# Patient Record
Sex: MALE | Race: BLACK OR AFRICAN AMERICAN | Employment: OTHER | URBAN - METROPOLITAN AREA
[De-identification: names, ages, dates, MRNs, and addresses within clinical notes are randomized per-mention and may not be internally consistent; named-entity substitution may affect disease eponyms.]

---

## 2017-01-04 ENCOUNTER — HOSPITAL ENCOUNTER (OUTPATIENT)
Dept: PHYSICAL THERAPY | Age: 62
Discharge: HOME | End: 2017-01-04
Payer: MEDICARE

## 2017-01-04 PROCEDURE — 97140 MANUAL THERAPY 1/> REGIONS: CPT

## 2017-01-04 PROCEDURE — 97110 THERAPEUTIC EXERCISES: CPT

## 2017-01-04 NOTE — PROGRESS NOTES
PT DAILY TREATMENT NOTE - [facility]     Patient Name: Aggie Palomino  Date:2017  : 1955  [x]  Patient  Verified  Payor: Tila Dean / Plan: [facility] HUMANA MEDICARE CHOICE PPO/PFFS / Product Type: Managed Care Medicare /    In time:11:29  Out time:12:21  Total Treatment Time (min): 52  Total Timed Codes (min): 42  1:1 Treatment Time ( only): 38   Visit #: 1 of 12    Treatment Area: Right shoulder pain [M25.511]  Unspecified rotator cuff tear or rupture of right shoulder, not specified as traumatic [M75.101]    SUBJECTIVE  Pain Level (0-10 scale): 2  Any medication changes, allergies to medications, adverse drug reactions, diagnosis change, or new procedure performed?: [x] No    [] Yes (see summary sheet for update)  Subjective functional status/changes:   [] No changes reported  Pt reports he feels achy today. OBJECTIVE    Modality rationale: decrease pain to improve the patients ability to decrease difficulty while performing tasks.     Min Type Additional Details    [] Estim:  []Unatt       []IFC  []Premod                        []Other:  []w/ice   []w/heat  Position:  Location:    [] Estim: []Att    []TENS instruct  []NMES                    []Other:  []w/US   []w/ice   []w/heat  Position:  Location:    []  Traction: [] Cervical       []Lumbar                       [] Prone          []Supine                       []Intermittent   []Continuous Lbs:  [] before manual  [] after manual    []  Ultrasound: []Continuous   [] Pulsed                           []1MHz   []3MHz W/cm2:  Location:    []  Iontophoresis with dexamethasone         Location: [] Take home patch   [] In clinic   10 [x]  Ice     []  heat  []  Ice massage  []  Laser   []  Anodyne Position:supine  Location:R shoulder    []  Laser with stim  []  Other:  Position:  Location:    []  Vasopneumatic Device Pressure:       [] lo [] med [] hi   Temperature: [] lo [] med [] hi   [] Skin assessment post-treatment:  []intact []redness- no adverse reaction    []redness  adverse reaction:       32 min Therapeutic Exercise:  [x] See flow sheet :   Rationale: increase ROM and increase strength to improve the patients ability to increase tolerance to activities. 10 min Manual Therapy:  Per flow sheet   Rationale: decrease pain, increase ROM, increase tissue extensibility and decrease trigger points to increase ease with ADLs. With   [] TE   [] TA   [] neuro   [] other: Patient Education: [x] Review HEP    [] Progressed/Changed HEP based on:   [] positioning   [] body mechanics   [] transfers   [] heat/ice application    [] other:      Other Objective/Functional Measures: Pt had no increase in pain at end range with PROM of flexion. Pain Level (0-10 scale) post treatment:0    ASSESSMENT/Changes in Function: Continue per POC. Patient will continue to benefit from skilled PT services to modify and progress therapeutic interventions, address functional mobility deficits, address ROM deficits, address strength deficits and analyze and address soft tissue restrictions to attain remaining goals. []  See Plan of Care  []  See progress note/recertification  []  See Discharge Summary         Progress towards goals / Updated goals:  1. Pt will improve PROM R shoulder to full in all planes for ease w/ return to AROM.    At PN:  PROM flex 160 deg, scap 155 deg (12/8/16)      Updated Goal:  1) Increase AROM to 150 degrees with flex and scap to increase ease of ADLs. At PN:  standing 120 degrees flexion, 129 degrees scaption. 12/28/16  2.  Pt will improve MMT R shoulder to > or = to 4/5 for ease w/ reaching OH  At PN: NT    PLAN  []  Upgrade activities as tolerated     [x]  Continue plan of care  []  Update interventions per flow sheet       []  Discharge due to:_  []  Other:_      Valeria Soliz, PTA 1/4/2017  11:54 AM    Future Appointments  Date Time Provider Milly Hennessy   1/5/2017 5:00 PM Micky Baez, PT [facility] 1/10/2017 1:00 PM MAYRA Kim [facility] SCHED   1/11/2017 11:00 AM Mynor Richter, PT [facility] [address]   1/13/2017 11:00 AM Kaylin Bernal, PTA [facility] [address]   1/17/2017 12:30 PM April Aleman PTA [facility] [address]   1/26/2017 2:15 PM Malka Maldonado MD [id number]

## 2017-01-05 ENCOUNTER — HOSPITAL ENCOUNTER (OUTPATIENT)
Dept: PHYSICAL THERAPY | Age: 62
Discharge: HOME | End: 2017-01-05
Payer: MEDICARE

## 2017-01-05 PROCEDURE — 97140 MANUAL THERAPY 1/> REGIONS: CPT

## 2017-01-05 PROCEDURE — 97110 THERAPEUTIC EXERCISES: CPT

## 2017-01-05 NOTE — PROGRESS NOTES
PT DAILY TREATMENT NOTE - [facility]     Patient Name: Mariaa Loges  Date:2017  : 1955  [x]  Patient  Verified  Payor: Martha Henry / Plan: [facility] HUMANA MEDICARE CHOICE PPO/PFFS / Product Type: Managed Care Medicare /    In time: 4:36  Out time:5:26  Total Treatment Time (min): 50  Total Timed Codes (min): 40  1:1 Treatment Time ( only): 40   Visit #: 2 of 12    Treatment Area: Right shoulder pain [M25.511]  Unspecified rotator cuff tear or rupture of right shoulder, not specified as traumatic [M75.101]    SUBJECTIVE  Pain Level (0-10 scale): 2  Any medication changes, allergies to medications, adverse drug reactions, diagnosis change, or new procedure performed?: [x] No    [] Yes (see summary sheet for update)  Subjective functional status/changes:   [] No changes reported  Pt reports minimal pain today.      OBJECTIVE    Modality rationale: decrease inflammation and decrease pain to improve the patients ability to perform ADL's   Min Type Additional Details    [] Estim:  []Unatt       []IFC  []Premod                        []Other:  []w/ice   []w/heat  Position:  Location:    [] Estim: []Att    []TENS instruct  []NMES                    []Other:  []w/US   []w/ice   []w/heat  Position:  Location:    []  Traction: [] Cervical       []Lumbar                       [] Prone          []Supine                       []Intermittent   []Continuous Lbs:  [] before manual  [] after manual    []  Ultrasound: []Continuous   [] Pulsed                           []1MHz   []3MHz W/cm2:  Location:    []  Iontophoresis with dexamethasone         Location: [] Take home patch   [] In clinic   10 [x]  Ice     []  heat  []  Ice massage  []  Laser   []  Anodyne Position: supine  Location: R shoulder    []  Laser with stim  []  Other:  Position:  Location:    []  Vasopneumatic Device Pressure:       [] lo [] med [] hi   Temperature: [] lo [] med [] hi   [] Skin assessment post-treatment:  []intact []redness- no adverse reaction    []redness  adverse reaction:      min []Eval                  []Re-Eval       30 min Therapeutic Exercise:  [x] See flow sheet :   Rationale: increase ROM, increase strength and improve coordination to improve the patients ability to decrease pain and perform ADL's     min Therapeutic Activity:  []  See flow sheet :   Rationale:   to improve the patients ability to       min Neuromuscular Re-education:  []  See flow sheet :   Rationale:  to improve the patients ability to     10 min Manual Therapy:  Per flow sheet   Rationale: decrease pain, increase ROM, increase tissue extensibility and decrease trigger points to increase ease with ADL's     min Gait Training:  ___ feet with ___ device on level surfaces with ___ level of assist   Rationale: With   [] TE   [] TA   [] neuro   [] other: Patient Education: [x] Review HEP    [] Progressed/Changed HEP based on:   [] positioning   [] body mechanics   [] transfers   [] heat/ice application    [] other:      Other Objective/Functional Measures: PROM flex/scap 160 deg     Pain Level (0-10 scale) post treatment: 0    ASSESSMENT/Changes in Function: cont per POC    Patient will continue to benefit from skilled PT services to modify and progress therapeutic interventions, address functional mobility deficits, address ROM deficits, address strength deficits, analyze and address soft tissue restrictions, analyze and cue movement patterns, assess and modify postural abnormalities and instruct in home and community integration to attain remaining goals. []  See Plan of Care  []  See progress note/recertification  []  See Discharge Summary         Progress towards goals / Updated goals:  1. Pt will improve PROM R shoulder to full in all planes for ease w/ return to AROM.     At PN: PROM flex 160 deg, scap 155 deg (12/8/16)  Current: PROM flex 160 deg, scap 160 deg (1/5/17)      Updated Goal:  1) Increase AROM to 150 degrees with flex and scap to increase ease of ADLs. At PN: standing 120 degrees flexion, 129 degrees scaption. 12/28/16  2.  Pt will improve MMT R shoulder to > or = to 4/5 for ease w/ reaching OH  At PN: NT    PLAN  [x]  Upgrade activities as tolerated     [x]  Continue plan of care  []  Update interventions per flow sheet       []  Discharge due to:_  []  Other:_      Milton Curtis, PT 1/5/2017  4:49 PM    Future Appointments  Date Time Provider Milly Hennessy   1/5/2017 5:00 PM Milton Curtis [place] [facility]   1/10/2017 1:00 PM MAYRA Guallpa [facility]   1/11/2017 11:00 AM Justus Solis PT [facility]   1/13/2017 11:00 AM April Bosch PTA [facility]   1/17/2017 12:30 PM April Aleman PTA [facility]   1/26/2017 2:15 PM Yohana Nance MD [address] [place] [address]

## 2017-01-06 ENCOUNTER — APPOINTMENT (OUTPATIENT)
Dept: PHYSICAL THERAPY | Age: 62
End: 2017-01-06
Payer: MEDICARE

## 2017-01-10 ENCOUNTER — OFFICE VISIT (OUTPATIENT)
Dept: ORTHOPEDIC SURGERY | Age: 62
End: 2017-01-10

## 2017-01-10 VITALS
WEIGHT: 178 LBS | DIASTOLIC BLOOD PRESSURE: 77 MMHG | SYSTOLIC BLOOD PRESSURE: 140 MMHG | BODY MASS INDEX: 26.36 KG/M2 | HEIGHT: 69 IN | HEART RATE: 88 BPM

## 2017-01-10 DIAGNOSIS — M75.121 COMPLETE TEAR OF RIGHT ROTATOR CUFF: Primary | ICD-10-CM

## 2017-01-10 RX ORDER — TRAMADOL HYDROCHLORIDE 50 MG/1
50 TABLET ORAL
Qty: 40 TAB | Refills: 0 | Status: SHIPPED | OUTPATIENT
Start: 2017-01-10 | End: 2018-12-04

## 2017-01-10 NOTE — PROGRESS NOTES
Misty Sandoval  1955   Chief Complaint   Patient presents with    Shoulder Pain     RIGHT        HISTORY OF PRESENT ILLNESS  Misty Sandoval is a 64 y.o. male who presents today for reevaluation of right shoulder arthroscopic rotator cuff repair on 9/16/16. He is doing very well and states that his pain continues to improve. He still has some pain with motions. Patient denies any fever, chills, chest pain, shortness of breath or calf pain. There are no new illness or injuries to report since last seen in the office. No changes in medications, allergies, social or family history. PHYSICAL EXAM:   Visit Vitals    /77    Pulse 88    Ht 5' 9\" (1.753 m)    Wt 178 lb (80.7 kg)    BMI 26.29 kg/m2     The patient is a well-developed, well-nourished male   in no acute distress. The patient is alert and oriented times three. The patient is alert and oriented times three. Mood and affect are normal.  LYMPHATIC: lymph nodes are not enlarged and are within normal limits  SKIN: normal in color and non tender to palpation. There are no bruises or abrasions noted. NEUROLOGICAL: Motor sensory exam is within normal limits. Reflexes are equal bilaterally. There is normal sensation to pinprick and light touch  MUSCULOSKELETAL:  Inspection of right shoulder  No swelling  No ttp  Passive glenohumeral abduction 0-90 degrees  Able to reach over head  No instability       IMPRESSION:      ICD-10-CM ICD-9-CM    1. Complete tear of right rotator cuff M75.121 727.61         PLAN:   1. Cont with PT. Transition to HEP when ready  2. rx for tramadol given for occasional pain  3.  RTC 6 weeks  Follow-up Disposition: Not on [address] [place] [address], [facility]

## 2017-01-11 ENCOUNTER — HOSPITAL ENCOUNTER (OUTPATIENT)
Dept: PHYSICAL THERAPY | Age: 62
Discharge: HOME | End: 2017-01-11
Payer: MEDICARE

## 2017-01-11 PROCEDURE — 97110 THERAPEUTIC EXERCISES: CPT

## 2017-01-11 PROCEDURE — 97140 MANUAL THERAPY 1/> REGIONS: CPT

## 2017-01-11 NOTE — PROGRESS NOTES
PT DAILY TREATMENT NOTE - [facility]     Patient Name: Mariaa Loges  Date:2017  : 1955  [x]  Patient  Verified  Payor: Martha Henry / Plan: [facility] HUMANA MEDICARE CHOICE PPO/PFFS / Product Type: Managed Care Medicare /    In time:10:56  Out time:11:39  Total Treatment Time (min): 43  Total Timed Codes (min): 43  1:1 Treatment Time ( [address] only): 37   Visit #: 3 of 12    Treatment Area: Right shoulder pain [M25.511]  Unspecified rotator cuff tear or rupture of right shoulder, not specified as traumatic [M75.101]    SUBJECTIVE  Pain Level (0-10 scale): 3  Any medication changes, allergies to medications, adverse drug reactions, diagnosis change, or new procedure performed?: [x] No    [] Yes (see summary sheet for update)  Subjective functional status/changes:   [] No changes reported  Pt reports he thinks his shoulder is getting better.      OBJECTIVE    Modality rationale: decrease pain to improve the patients ability to decrease difficulty while    Min Type Additional Details    [] Estim:  []Unatt       []IFC  []Premod                        []Other:  []w/ice   []w/heat  Position:  Location:    [] Estim: []Att    []TENS instruct  []NMES                    []Other:  []w/US   []w/ice   []w/heat  Position:  Location:    []  Traction: [] Cervical       []Lumbar                       [] Prone          []Supine                       []Intermittent   []Continuous Lbs:  [] before manual  [] after manual    []  Ultrasound: []Continuous   [] Pulsed                           []1MHz   []3MHz W/cm2:  Location:    []  Iontophoresis with dexamethasone         Location: [] Take home patch   [] In clinic    []  Ice     []  heat  []  Ice massage  []  Laser   []  Anodyne Position:  Location:    []  Laser with stim  []  Other:  Position:  Location:    []  Vasopneumatic Device Pressure:       [] lo [] med [] hi   Temperature: [] lo [] med [] hi   [] Skin assessment post-treatment:  []intact []redness- no adverse reaction []redness  adverse reaction:       33 min Therapeutic Exercise:  [x] See flow sheet :   Rationale: increase ROM and increase strength to improve the patients ability to increase tolerance to activities. 10 min Manual Therapy:  Per flow sheet   Rationale: decrease pain, increase ROM, increase tissue extensibility and decrease trigger points to increase ease with ADLs. With   [] TE   [] TA   [] neuro   [] other: Patient Education: [x] Review HEP    [] Progressed/Changed HEP based on:   [] positioning   [] body mechanics   [] transfers   [] heat/ice application    [] other:      Other Objective/Functional Measures:  [id number] deg, scap 125 deg     Pain Level (0-10 scale) post treatment: 0    ASSESSMENT/Changes in Function: Continue per POC. Patient will continue to benefit from skilled PT services to modify and progress therapeutic interventions, address functional mobility deficits, address ROM deficits, address strength deficits and analyze and address soft tissue restrictions to attain remaining goals. []  See Plan of Care  []  See progress note/recertification  []  See Discharge Summary         Progress towards goals / Updated goals:  1. Pt will improve PROM R shoulder to full in all planes for ease w/ return to AROM.    At PN: PROM flex 160 deg, scap 155 deg (12/8/16)  Current: PROM flex 160 deg, scap 160 deg (1/5/17)      Updated Goal:  1) Increase AROM to 150 degrees with flex and scap to increase ease of ADLs. At PN: standing 120 degrees flexion, 129 degrees scaption. 12/28/16  Current: sitting  [id number] deg, scap 125 deg 1/11/17  2.  Pt will improve MMT R shoulder to > or = to 4/5 for ease w/ reaching OH  At PN: NT    PLAN  []  Upgrade activities as tolerated     [x]  Continue plan of care  []  Update interventions per flow sheet       []  Discharge due to:_  []  Other:_      Arnold Carrasquillo, PTA 1/11/2017  11:01 AM    Future Appointments  Date Time Provider Milly Hennessy   1/13/2017 11:00 AM Willie South, [place] [facility]   1/17/2017 12:30 PM April Aleman PTA [facility]   1/26/2017 2:15 PM Amina Elizabeth MD [facility] [address]   2/21/2017 1:00 PM MAYRA Mcclellan MD [facility]

## 2017-01-13 ENCOUNTER — HOSPITAL ENCOUNTER (OUTPATIENT)
Dept: PHYSICAL THERAPY | Age: 62
Discharge: HOME | End: 2017-01-13
Payer: MEDICARE

## 2017-01-13 PROCEDURE — 97110 THERAPEUTIC EXERCISES: CPT

## 2017-01-13 PROCEDURE — 97140 MANUAL THERAPY 1/> REGIONS: CPT

## 2017-01-13 NOTE — PROGRESS NOTES
PT DAILY TREATMENT NOTE - [facility]     Patient Name: Basim Lutz  Date:2017  : 1955  [x]  Patient  Verified  Payor: Kandi Claude / Plan: [facility] HUMAN MEDICARE CHOICE PPO/PFFS / Product Type: Managed Care Medicare /    In time:10:56  Out time:11:51  Total Treatment Time (min): 55  Total Timed Codes (min): 45  1:1 Treatment Time ( only): 30   Visit #: 4 of 12    Treatment Area: Right shoulder pain [M25.511]  Unspecified rotator cuff tear or rupture of right shoulder, not specified as traumatic [M75.101]    SUBJECTIVE  Pain Level (0-10 scale): 3/10  Any medication changes, allergies to medications, adverse drug reactions, diagnosis change, or new procedure performed?: [x] No    [] Yes (see summary sheet for update)  Subjective functional status/changes:   [] No changes reported  Pt states he the MD want him to cont PT until he goes back to her in Feb.     OBJECTIVE    Modality rationale: decrease pain to improve the patients ability to increase ease of ADLs.     Min Type Additional Details    [] Estim:  []Unatt       []IFC  []Premod                        []Other:  []w/ice   []w/heat  Position:  Location:    [] Estim: []Att    []TENS instruct  []NMES                    []Other:  []w/US   []w/ice   []w/heat  Position:  Location:    []  Traction: [] Cervical       []Lumbar                       [] Prone          []Supine                       []Intermittent   []Continuous Lbs:  [] before manual  [] after manual    []  Ultrasound: []Continuous   [] Pulsed                           []1MHz   []3MHz W/cm2:  Location:    []  Iontophoresis with dexamethasone         Location: [] Take home patch   [] In clinic   10 [x]  Ice     []  heat  []  Ice massage  []  Laser   []  Anodyne Position: supine  Location: R shoulder    []  Laser with stim  []  Other:  Position:  Location:    []  Vasopneumatic Device Pressure:       [] lo [] med [] hi   Temperature: [] lo [] med [] hi   [] Skin assessment post-treatment: []intact []redness- no adverse reaction    []redness  adverse reaction:     35 min Therapeutic Exercise:  [x] See flow sheet :   Rationale: increase ROM and increase strength to improve the patients ability to perform ADLs. 10 min Manual Therapy:  Per flow sheet   Rationale: decrease pain, increase ROM and increase tissue extensibility to increase ease of ADLs. With   [] TE   [] TA   [] neuro   [] other: Patient Education: [x] Review HEP    [] Progressed/Changed HEP based on:   [] positioning   [] body mechanics   [] transfers   [] heat/ice application    [] other:      Other Objective/Functional Measures: VCs to decrease UT substitution. Pain Level (0-10 scale) post treatment: 1/10    ASSESSMENT/Changes in Function: Cont per POC. Patient will continue to benefit from skilled PT services to modify and progress therapeutic interventions, address functional mobility deficits, address ROM deficits and address strength deficits to attain remaining goals. []  See Plan of Care  []  See progress note/recertification  []  See Discharge Summary         Progress towards goals / Updated goals:  1. Pt will improve PROM R shoulder to full in all planes for ease w/ return to AROM.    At PN: PROM flex 160 deg, scap 155 deg (12/8/16)  Current: PROM flex 160 deg, scap 160 deg (1/5/17)      Updated Goal:  1) Increase AROM to 150 degrees with flex and scap to increase ease of ADLs. At PN: standing 120 degrees flexion, 129 degrees scaption. 12/28/16  Current: sitting [id number] deg, scap 125 deg 1/11/17  2.  Pt will improve MMT R shoulder to > or = to 4/5 for ease w/ reaching OH  At PN: NT    PLAN  []  Upgrade activities as tolerated     [x]  Continue plan of care  []  Update interventions per flow sheet       []  Discharge due to:_  []  Other:_      April Aleman PTA 1/13/2017  11:45 AM    Future Appointments  Date Time Provider Milly Hennessy   1/17/2017 12:30 PM April Aleman PTA [facility] 1/26/2017 2:15 PM Fernanda Lopes  [address]   2/21/2017 1:00 PM MAYRA Arellano

## 2017-01-17 ENCOUNTER — APPOINTMENT (OUTPATIENT)
Dept: PHYSICAL THERAPY | Age: 62
End: 2017-01-17
Payer: MEDICARE

## 2017-01-19 ENCOUNTER — HOSPITAL ENCOUNTER (OUTPATIENT)
Dept: PHYSICAL THERAPY | Age: 62
End: 2017-01-19
Payer: MEDICARE

## 2017-01-23 ENCOUNTER — HOSPITAL ENCOUNTER (OUTPATIENT)
Dept: PHYSICAL THERAPY | Age: 62
Discharge: HOME | End: 2017-01-23
Payer: MEDICARE

## 2017-01-23 PROCEDURE — G8986 CARRY D/C STATUS: HCPCS

## 2017-01-23 PROCEDURE — G8985 CARRY GOAL STATUS: HCPCS

## 2017-01-23 PROCEDURE — 97140 MANUAL THERAPY 1/> REGIONS: CPT

## 2017-01-23 PROCEDURE — 97110 THERAPEUTIC EXERCISES: CPT

## 2017-01-23 NOTE — PROGRESS NOTES
PT DAILY TREATMENT NOTE - [facility]     Patient Name: Rojas Davidson  Date:2017  : 1955  [x]  Patient  Verified  Payor: Nakia Kelley / Plan: [facility] HUMAN MEDICARE CHOICE PPO/PFFS / Product Type: Managed Care Medicare /    In time:12:01  Out time:12:42  Total Treatment Time (min): 41  Total Timed Codes (min): 41  1:1 Treatment Time ( [address] only): 41   Visit #: 5 of 12    Treatment Area: Right shoulder pain [M25.511]  Unspecified rotator cuff tear or rupture of right shoulder, not specified as traumatic [M75.101]    SUBJECTIVE  Pain Level (0-10 scale): 0  Any medication changes, allergies to medications, adverse drug reactions, diagnosis change, or new procedure performed?: [x] No    [] Yes (see summary sheet for update)  Subjective functional status/changes:   [] No changes reported  Pt reports no current pain and he has seen a sig improvement in shoulder over last week or two. Pt reports he is ready for D/C with HEP.      OBJECTIVE    Modality rationale:  to improve the patients ability to    Min Type Additional Details    [] Estim:  []Unatt       []IFC  []Premod                        []Other:  []w/ice   []w/heat  Position:  Location:    [] Estim: []Att    []TENS instruct  []NMES                    []Other:  []w/US   []w/ice   []w/heat  Position:  Location:    []  Traction: [] Cervical       []Lumbar                       [] Prone          []Supine                       []Intermittent   []Continuous Lbs:  [] before manual  [] after manual    []  Ultrasound: []Continuous   [] Pulsed                           []1MHz   []3MHz W/cm2:  Location:    []  Iontophoresis with dexamethasone         Location: [] Take home patch   [] In clinic    []  Ice     []  heat  []  Ice massage  []  Laser   []  Anodyne Position:  Location:    []  Laser with stim  []  Other:  Position:  Location:    []  Vasopneumatic Device Pressure:       [] lo [] med [] hi   Temperature: [] lo [] med [] hi   [] Skin assessment post-treatment:  []intact []redness- no adverse reaction    []redness  adverse reaction:      min []Eval                  []Re-Eval       33 min Therapeutic Exercise:  [x] See flow sheet :   Rationale: increase ROM, increase strength and improve coordination to improve the patients ability to perform ADL's     min Therapeutic Activity:  []  See flow sheet :   Rationale:   to improve the patients ability to       min Neuromuscular Re-education:  []  See flow sheet :   Rationale:   to improve the patients ability to     8 min Manual Therapy:  Goal reassessment   Rationale: to prepare for D/C     min Gait Training:  ___ feet with ___ device on level surfaces with ___ level of assist   Rationale: With   [] TE   [] TA   [] neuro   [] other: Patient Education: [x] Review HEP    [] Progressed/Changed HEP based on:   [] positioning   [] body mechanics   [] transfers   [] heat/ice application    [] other:      Other Objective/Functional Measures: see goals below     Pain Level (0-10 scale) post treatment: 0    ASSESSMENT/Changes in Function: D/C due to progress toward goals     []  See Plan of Care  []  See progress note/recertification  [x]  See Discharge Summary         Progress towards goals / Updated goals:  1. Pt will improve PROM R shoulder to full in all planes for ease w/ return to AROM.    At PN: PROM flex 160 deg, scap 155 deg (12/8/16)  Current: PROM flex 160 deg, scap 160 deg (1/5/17)      Updated Goal:  1) Increase AROM to 150 degrees with flex and scap to increase ease of ADLs. At PN: standing 120 degrees flexion, 129 degrees scaption. 12/28/16  Current: Partially met- flex 150 deg, scap 145 deg (1/23/17)  2.  Pt will improve MMT R shoulder to > or = to 4/5 for ease w/ reaching [place]  At PN: NT  Current: Partially met- flex 4-/5, abd 4/5, ER 4-/5, IR 4/5, ext 4+/5 (1/23/17)    PLAN  []  Upgrade activities as tolerated     []  Continue plan of care  []  Update interventions per flow sheet       [x] Discharge due to: Progress toward goals  []  Other:_      Monda Fuel, PT 1/23/2017  12:04 PM    Future Appointments  Date Time Provider Milly Mossi   1/26/2017 2:15 PM Arleth Duvall  [address]   1/26/2017 3:30 PM Florina Wakefield PTA [facility]   2/21/2017 1:00 PM MAYRA Gracia

## 2017-01-23 NOTE — PROGRESS NOTES
[facility]              [address] [facility]        [place], [address] [place]   [phone] fax      Discharge Summary  Patient name: Mariaa Velez Start of Care: 2016   Referral source: Kellie Mcardle,* : 1955   Medical/Treatment Diagnosis: Right shoulder pain [M25.511]  Unspecified rotator cuff tear or rupture of right shoulder, not specified as traumatic [M75.101] Onset Date:2016     Prior Hospitalization: see medical history Provider#: [id number]   Medications: Verified on Patient Summary List    Comorbidities: depression, tobacco use, DM, HBP, CA  Prior Level of Function: Prior to the accident that caused his tear pt was independent w/ ADLs, finished treatment for CA. R hand dominant  Visits from Start of Care: 33    Missed Visits: 2  Reporting Period : 2016 to 2017      Summary of Care:  Progress towards goals / Updated goals:  1. Pt will improve PROM R shoulder to full in all planes for ease w/ return to AROM.    At PN: PROM flex 160 deg, scap 155 deg   Current: PROM flex 160 deg, scap 160 deg       Updated Goal:  1) Increase AROM to 150 degrees with flex and scap to increase ease of ADLs. At PN: standing 120 degrees flexion, 129 degrees scaption. Current: Partially met- flex 150 deg, scap 145 deg   2. Pt will improve MMT R shoulder to > or = to 4/5 for ease w/ reaching OH  At PN: NT  Current: Partially met- flex 4-/5, abd 4/5, ER 4-/5, IR 4/5, ext 4+/5     Pt has progressed well with PT per shoulder protocol. Pt has demonstrated improvements in R shoulder PROM, AAROM, AROM, and strength. Pt continues to present with some strength deficits that will be treated with advanced HEP. Pt to be D/C at this time due to progress toward goals.      G-Codes (GP)  Carry    Goal  CJ= 20-39%   D/C  CJ= 20-39%    The severity rating is based on clinical judgment and the FOTO Score score.    ASSESSMENT/RECOMMENDATIONS:  [x]Discontinue therapy: [x]Patient has reached or is progressing toward set goals      []Patient is non-compliant or has abdicated      []Due to lack of appreciable progress towards set 1324 Dasia Quiles, PT 1/23/2017 12:41 PM

## 2017-01-26 ENCOUNTER — APPOINTMENT (OUTPATIENT)
Dept: PHYSICAL THERAPY | Age: 62
End: 2017-01-26
Payer: MEDICARE

## 2017-03-07 ENCOUNTER — OFFICE VISIT (OUTPATIENT)
Dept: ORTHOPEDIC SURGERY | Age: 62
End: 2017-03-07

## 2017-03-07 VITALS
WEIGHT: 178 LBS | HEART RATE: 90 BPM | SYSTOLIC BLOOD PRESSURE: 112 MMHG | BODY MASS INDEX: 26.36 KG/M2 | DIASTOLIC BLOOD PRESSURE: 66 MMHG | HEIGHT: 69 IN | TEMPERATURE: 98.7 F

## 2017-03-07 DIAGNOSIS — M75.121 COMPLETE TEAR OF RIGHT ROTATOR CUFF: Primary | ICD-10-CM

## 2017-03-07 NOTE — PROGRESS NOTES
Patito Rodriguez  1955   No chief complaint on file. HISTORY OF PRESENT ILLNESS  Patito Rodriguez is a 64 y.o. male who presents today for reevaluation of right shoulder arthroscopic rotator cuff repair on 9/16/16. He is doing very well. He has finished Physical therapy and is doing his home exercise program on his own. He states occasional soreness but no issues. Patient denies any fever, chills, chest pain, shortness of breath or calf pain. There are no new illness or injuries to report since last seen in the office. No changes in medications, allergies, social or family history. PHYSICAL EXAM:   There were no vitals taken for this visit. The patient is a well-developed, well-nourished male   in no acute distress. The patient is alert and oriented times three. The patient is alert and oriented times three. Mood and affect are normal.  LYMPHATIC: lymph nodes are not enlarged and are within normal limits  SKIN: normal in color and non tender to palpation. There are no bruises or abrasions noted. NEUROLOGICAL: Motor sensory exam is within normal limits. Reflexes are equal bilaterally. There is normal sensation to pinprick and light touch  MUSCULOSKELETAL:  Inspection of right shoulder  No swelling  No ttp  Passive glenohumeral abduction 0-90 degrees, able to reach up over his head  Able to reach over head  No instability       IMPRESSION:      ICD-10-CM ICD-9-CM    1. Complete tear of right rotator cuff M75.121 727.61         PLAN:   1. Patient continues to improve  2. Will continue with HEP  3. Gradually increase activities  4.  RTC PRN  Follow-up Disposition: Not on [address] [place] [address], LAITH Aleg[address] and Sp[facility] Specialist

## 2018-06-05 ENCOUNTER — HOSPITAL ENCOUNTER (OUTPATIENT)
Dept: ONCOLOGY | Age: 63
Discharge: HOME | End: 2018-06-05

## 2018-06-05 ENCOUNTER — OFFICE VISIT (OUTPATIENT)
Dept: ONCOLOGY | Age: 63
End: 2018-06-05

## 2018-06-05 VITALS
SYSTOLIC BLOOD PRESSURE: 129 MMHG | HEART RATE: 76 BPM | BODY MASS INDEX: 28.85 KG/M2 | WEIGHT: 194.8 LBS | HEIGHT: 69 IN | TEMPERATURE: 98 F | DIASTOLIC BLOOD PRESSURE: 68 MMHG

## 2018-06-05 DIAGNOSIS — D64.9 CHRONIC ANEMIA: Primary | ICD-10-CM

## 2018-06-05 DIAGNOSIS — D64.9 CHRONIC ANEMIA: ICD-10-CM

## 2018-06-05 DIAGNOSIS — D50.8 IRON DEFICIENCY ANEMIA SECONDARY TO INADEQUATE DIETARY IRON INTAKE: ICD-10-CM

## 2018-06-05 LAB
BASO+EOS+MONOS # BLD AUTO: 0.4 K/UL (ref 0–2.3)
BASO+EOS+MONOS # BLD AUTO: 9 % (ref 0.1–17)
DIFFERENTIAL METHOD BLD: ABNORMAL
ERYTHROCYTE [DISTWIDTH] IN BLOOD BY AUTOMATED COUNT: 12.9 % (ref 11.5–14.5)
HCT VFR BLD AUTO: 33.6 % (ref 36–48)
HGB BLD-MCNC: 11.9 G/DL (ref 12–16)
LYMPHOCYTES # BLD: 1.6 K/UL (ref 1.1–5.9)
LYMPHOCYTES NFR BLD: 37 % (ref 14–44)
MCH RBC QN AUTO: 31 PG (ref 25–35)
MCHC RBC AUTO-ENTMCNC: 35.4 G/DL (ref 31–37)
MCV RBC AUTO: 87.5 FL (ref 78–102)
NEUTS SEG # BLD: 2.3 K/UL (ref 1.8–9.5)
NEUTS SEG NFR BLD: 54 % (ref 40–70)
PLATELET # BLD AUTO: 287 K/UL (ref 140–440)
RBC # BLD AUTO: 3.84 M/UL (ref 4.1–5.1)
WBC # BLD AUTO: 4.3 K/UL (ref 4.5–13)

## 2018-06-05 NOTE — MR AVS SNAPSHOT
[address] [facility] [address] [facility] [address] 
[phone] Patient: Su Song MRN: [id number] ZET:5[birth year]07/[birth year] Visit Information Date & Time Provider Department Dept. Phone Encounter #  
 6/5/2018 11:00 AM Florida Mills MD [facility]  [phone] [id number] Your Appointments 6/22/2018 10:00 AM  
Office Visit with Florida Mills MD  
[facility]  [address] [facility]) Appt Note: LAB RESULTS  
 [facility] [address] [facility] [address]  
[phone]  
  
   
 [facility] [address] [place] [address] [place] Upcoming Health Maintenance Date Due Hepatitis C Screening 1955 DTaP/Tdap/Td series (1 - Tdap) 7/12/1976 FOBT Q 1 YEAR AGE 50-75 7/12/2005 ZOSTER VACCINE AGE 60> 5/12/2015 Pneumococcal 19-64 Highest Risk (2 of 3 - PCV13) 12/5/2017 MEDICARE YEARLY EXAM 3/14/2018 Influenza Age 5 to Adult 8/1/2018 Allergies as of 6/5/2018  Review Complete On: 6/5/2018 By: Florida Mills MD  
 No Known Allergies Current Immunizations  Never Reviewed No immunizations on file. Not reviewed this visit You Were Diagnosed With   
  
 Codes Comments Chronic anemia    -  Primary ICD-10-CM: D64.9 ICD-9-CM: 285.9 Iron deficiency anemia secondary to inadequate dietary iron intake     ICD-10-CM: D50.8 ICD-9-CM: 280.1 Vitals BP Pulse Temp Height(growth percentile) Weight(growth percentile) BMI  
 129/68 76 98 °F (36.7 °C) (Oral) 5' 9\" (1.753 m) 194 lb 12.8 oz (88.4 kg) 28.77 kg/m2 Smoking Status Former Smoker BMI and BSA Data Body Mass Index Body Surface Area 28.77 kg/m 2 2.07 m 2 Preferred Pharmacy Pharmacy Name Phone [phone] [facility], [address] [facility] Your Updated Medication List  
  
   
This list is accurate as of 6/5/18 12:05 PM.  Always use your most recent med list.  
  
  
  
  
 cyclobenzaprine 10 mg tablet Commonly known as:  FLEXERIL Take 1 Tab by mouth three (3) times daily as needed for Muscle Spasm(s). diclofenac EC 75 mg EC tablet Commonly known as:  VOLTAREN Take 1 Tab by mouth two (2) times daily (with meals). donepezil 10 mg tablet Commonly known as:  ARICEPT Take 10 mg by mouth nightly.  
  
 gabapentin 300 mg capsule Commonly known as:  NEURONTIN  
1 in the am and 2 in the pm - taper up as directed  
  
 glimepiride 2 mg tablet Commonly known as:  AMARYL Take  by mouth every morning. glipiZIDE 10 mg tablet Commonly known as:  Sanabria Sean Take 10 mg by mouth two (2) times a day. indomethacin 25 mg capsule Commonly known as:  INDOCIN Take  by mouth three (3) times daily. lisinopril 20 mg tablet Commonly known as:  Velora Hemal Take 20 mg by mouth daily. LUNESTA 3 mg tablet Generic drug:  eszopiclone Take 3 mg by mouth nightly. metFORMIN 1,000 mg tablet Commonly known as:  GLUCOPHAGE Take 1,000 mg by mouth two (2) times daily (with meals). polyethylene glycol 17 gram packet Commonly known as:  Silverio Prospect Take 1 Packet by mouth daily. PRISTIQ 50 mg ER tablet Generic drug:  desvenlafaxine succinate Take 50 mg by mouth as needed. promethazine 25 mg tablet Commonly known as:  PHENERGAN Take 1 Tab by mouth every six (6) hours as needed for Nausea. Indications: PREVENTION OF POST-OPERATIVE NAUSEA AND VOMITING  
  
 simvastatin 10 mg tablet Commonly known as:  ZOCOR Take  by mouth nightly. traMADol 50 mg tablet Commonly known as:  ULTRAM  
Take 1 Tab by mouth every six (6) hours as needed for Pain. Max Daily Amount: 200 mg. We Performed the Following COMPLETE CBC & AUTO DIFF WBC [19705 CPT(R)] ERYTHROPOIETIN [39369 CPT(R)] FERRITIN [51828 CPT(R)] IRON PROFILE [id number] CPT(R)] METABOLIC PANEL, COMPREHENSIVE [47467 CPT(R)] PROTEIN ELECTROPHORESIS [08168 CPT(R)] To-Do List   
 06/05/2018 Lab:  CBC WITH 3 PART DIFF Patient Instructions Complete Blood Count (CBC): About This Test 
What is it? A complete blood count (CBC) is a blood test that gives important information about your blood cells, especially red blood cells, white blood cells, and platelets. Why is this test done? A CBC may be done as part of a regular physical exam. There are many other reasons that a doctor may want this blood test, including to: · Find the cause of symptoms such as fatigue, weakness, fever, bruising, or weight loss. · Find anemia or an infection. · See how much blood has been lost if there is bleeding. · Diagnose diseases of the blood, such as leukemia or polycythemia. How can you prepare for the test? 
You do not need to do anything before having this test. 
What happens during the test? 
The health professional taking a sample of your blood will: · Wrap an elastic band around your upper arm. This makes the veins below the band larger so it is easier to put a needle into the vein. · Clean the needle site with alcohol. · Put the needle into the vein. · Attach a tube to the needle to fill it with blood. · Remove the band from your arm when enough blood is collected. · Put a gauze pad or cotton ball over the needle site as the needle is removed. · Put pressure on the site and then put on a bandage. If this blood test is done on a baby, a heel stick may be done instead of a blood draw from a vein. What happens after the test? 
· You will probably be able to go home right away. · You can go back to your usual activities right away. Follow-up care is a key part of your treatment and safety.  Be sure to make and go to all appointments, and call your doctor if you are having problems. It's also a good idea to keep a list of the medicines you take. Ask your doctor when you can expect to have your test results. Where can you learn more? Go to http://cleo-monika.info/. Enter G180 in the search box to learn more about \"Complete Blood Count (CBC): About This Test.\" Current as of: October 14, 2016 Content Version: 11.4 © 6247-4712 [employer]. Care instructions adapted under license by [employer] (which disclaims liability or warranty for this information). If you have questions about a medical condition or this instruction, always ask your healthcare professional. [address] any warranty or liability for your use of this information. Introducing [facility]! [facility] introduces Lomaki patient portal. Now you can access parts of your medical record, email your doctor's office, and request medication refills online. 1. In your internet browser, go to [email or web address] 2. Click on the First Time User? Click Here link in the Sign In box. You will see the New Member Sign Up page. 3. Enter your Lomaki Access Code exactly as it appears below. You will not need to use this code after youve completed the sign-up process. If you do not sign up before the expiration date, you must request a new code. · Lomaki Access Code: [email or web address] Expires: 9/3/2018 12:04 PM 
 
4. Enter the last four digits of your Social Security Number (xxxx) and Date of Birth (mm/dd/yyyy) as indicated and click Submit. You will be taken to the next sign-up page. 5. Create a Kapture Audiot ID. This will be your Lomaki login ID and cannot be changed, so think of one that is secure and easy to remember. 6. Create a Lomaki password. You can change your password at any time. 7. Enter your Password Reset Question and Answer. This can be used at a later time if you forget your password. 8. Enter your e-mail address. You will receive e-mail notification when new information is available in [address] 9. Click Sign Up. You can now view and download portions of your medical record. 10. Click the Download Summary menu link to download a portable copy of your medical information. If you have questions, please visit the Frequently Asked Questions section of the [employer] website. Remember, [employer] is NOT to be used for urgent needs. For medical emergencies, dial 911. Now available from your iPhone and Android! Please provide this summary of care documentation to your next provider. Your primary care clinician is listed as [email or web address] Player. If you have any questions after today's visit, please call [phone].

## 2018-06-05 NOTE — PROGRESS NOTES
Hematology/Oncology Consultation Note    Name: Abena Myers  Date: 2018  : 1955    PCP: Patrica Dietz MD       Mr. Emilio Vargas  is a 58 y.o. -American man who was referred for evaluation of anemia    Subjective:   Chief complaint: Anemia    History of present illness:  Mr. Emilio Vargas is a 71-year-old -American man who states that over the past 6 months he has noticed slowly progressive fatigue and some weakness. A CBC dated 2018 showed that he had evidence of anemia with a hemoglobin of 10.9 g/dL and hematocrit of 34.1%. His MCV was normal at 94.8 and his WBC count was normal at 4.56. The platelets were 306,009. On review of his metabolic panel he had a normal kidney function with a serum creatinine of 0.97 mg/dL and a BUN that was slightly elevated at 26 mg/dL. He denies having any gastrointestinal genitourinary blood loss. He was referred for an assessment of his anemia. Past Medical History:   Diagnosis Date    Chronic kidney disease     Diabetes mellitus     Essential hypertension     Kidney disease     Prostate cancer ([address]        No Known Allergies    Past Surgical History:   Procedure Laterality Date    HX CERVICAL FUSION      HX OTHER SURGICAL      Prostate Sx r/t cancer (seed implant)    HX OTHER SURGICAL      shoulder surgery       Social History     Social History    Marital status:      Spouse name: N/A    Number of children: N/A    Years of education: N/A     Occupational History    Not on file. Social History Main Topics    Smoking status: Former Smoker     Packs/day: 0.25    Smokeless tobacco: Never Used      Comment: Patient quit in , and started back.  Light Smoker now    Alcohol use No    Drug use: No    Sexual activity: Not on file     Other Topics Concern    Not on file     Social History Narrative       Family History   Problem Relation Age of Onset    Diabetes Father        Current Outpatient Prescriptions   Medication Sig Dispense Refill    traMADol (ULTRAM) 50 mg tablet Take 1 Tab by mouth every six (6) hours as needed for Pain. Max Daily Amount: 200 mg. 40 Tab 0    cyclobenzaprine (FLEXERIL) 10 mg tablet Take 1 Tab by mouth three (3) times daily as needed for Muscle Spasm(s). 60 Tab 0    metFORMIN (GLUCOPHAGE) 1,000 mg tablet Take 1,000 mg by mouth two (2) times daily (with meals).  promethazine (PHENERGAN) 25 mg tablet Take 1 Tab by mouth every six (6) hours as needed for Nausea. Indications: PREVENTION OF POST-OPERATIVE NAUSEA AND VOMITING 40 Tab 0    polyethylene glycol (MIRALAX) 17 gram packet Take 1 Packet by mouth daily. 30 Packet 1    indomethacin (INDOCIN) 25 mg capsule Take  by mouth three (3) times daily.  diclofenac EC (VOLTAREN) 75 mg EC tablet Take 1 Tab by mouth two (2) times daily (with meals). 60 Tab 2    gabapentin (NEURONTIN) 300 mg capsule 1 in the am and 2 in the pm - taper up as directed 90 Cap 2    simvastatin (ZOCOR) 10 mg tablet Take  by mouth nightly.  glimepiride (AMARYL) 2 mg tablet Take  by mouth every morning.  PRISTIQ 50 mg tablet Take 50 mg by mouth as needed.  donepezil (ARICEPT) 10 mg tablet Take 10 mg by mouth nightly.  LUNESTA 3 mg tablet Take 3 mg by mouth nightly.  glipiZIDE (GLUCOTROL) 10 mg tablet Take 10 mg by mouth two (2) times a day.  lisinopril (PRINIVIL, ZESTRIL) 20 mg tablet Take 20 mg by mouth daily. Review of Systems    General ROS:The patient has complaints of slowly progressive fatigue and some weakness. Psychological ROS: patient denies having any psychological symptoms such as hallucinations, depression or anxiety. Ophthalmic ROS:the patient denies having any visual impairment or eye discomfort. ENT ROS: there are no abnormalities reported. Allergy and Immunology ROS:the patient denies having any seasonal allergies or allergies to medications other than those already outlined above.   Hematological and Lymphatic ROS: the patient denies having any bruising, bleeding or lymphadenopathy. Endocrine ROS: the patient denies having any heat or cold intolerance. There is no history of diabetes or thyroid disorders. Breast ROS: the patient denies having any history of breast mass, nipple discharge, or lumps. Respiratory ROS:the patient denies having any cough, shortness of breath, or dyspnea on exertion. Cardiovascular ROS: there are no complaints of chest pain, palpitations, chest pounding, or dyspnea on exertion. Gastrointestinal ROS: the patient denies having nausea, emesis, diarrhea, constipation, or blood in the stool. Genito-Urinary ROS: the patient denies having urinary urgency, frequency, or dysuria. Musculoskeletal ROS: with the exception of mild arthralgias the patient has no other musculoskeletal complaints. Neurological ROS: the patient denies having any numbness, tingling, or neurologic deficits. Dermatological ROS:patient denies having any unexplained rash, skin ulcerations, or hives. Objective:     Visit Vitals    /68    Pulse 76    Temp 98 °F (36.7 °C) (Oral)    Ht 5' 9\" (1.753 m)    Wt 88.4 kg (194 lb 12.8 oz)    BMI 28.77 kg/m2        Physical Exam:   Gen. Appearance: the patient is in no acute distress. Skin: There is no evidence of bruise or rash. HEENT: The head is normocephalic and atraumatic. The conjunctiva and sclera are clear. Pupils are equal, round, reactive to light, and accommodation. The extraocular movements are intact. ENT reveals no oral mucosal lesions or ulcerations. Neck: Supple without lymphadenopathy or thyromegaly. Lungs: Clear to auscultation and percussion; there are no wheezes or rhonchi. Heart: Regular rate and rhythm; there are no murmurs, gallops, or rubs. Abdomen: Bowel sounds are present and normal.  There is no guarding, tenderness, or hepatosplenomegaly. Extremities: There is no clubbing, cyanosis, or edema.  Neurologic: There are no focal neurologic deficits. Lymphatics: There is no palpable peripheral lymphadenopathy. Lab data: The CBC dated 5/9/2018 shows a WBC count of 4.56, hemoglobin of 10.9 g/dL, hematocrit of 34.1%, and a platelet count of 846,696. The metabolic panel revealed an albumin of 4, alkaline phosphatase 91, alanine aminotransferase 18, aspartate aminotransferase 28, BUN 26, calcium 9.8, carbon dioxide 23, chloride 100, creatinine 0.97, creatinine kinase 178, glucose 262, , phosphorus 4.2, potassium 5.1, sodium 137, total bilirubin 0.3, and direct bilirubin was 0. 11. Assessment:   Chronic anemia: I suspect that the patient is developing a slowly progressive functional iron deficiency versus a potential plasma cell dyscrasia. Plan:   Chronic anemia: At this time I will order a comprehensive metabolic panel, iron profile, ferritin level, SPEP, and erythropoietin level. I will have the patient return to clinic in 2 weeks to review lab data and to discuss potential options of management. Follow-up in 2 weeks. Orders Placed This Encounter    METABOLIC PANEL, COMPREHENSIVE     Standing Status:   Future     Standing Expiration Date:   6/6/2019    IRON PROFILE     Standing Status:   Future     Standing Expiration Date:   6/6/2019    FERRITIN     Standing Status:   Future     Standing Expiration Date:   6/6/2019    SPEP     Standing Status:   Future     Standing Expiration Date:   6/6/2019    ERYTHROPOIETIN     Standing Status:   Future     Standing Expiration Date:   6/6/2019           Ahmet Duke MD  6/5/2018      Please note: This document has been produced using voice recognition software. Unrecognized errors in transcription may be present.

## 2018-06-05 NOTE — PATIENT INSTRUCTIONS
Complete Blood Count (CBC): About This Test  What is it? A complete blood count (CBC) is a blood test that gives important information about your blood cells, especially red blood cells, white blood cells, and platelets. Why is this test done? A CBC may be done as part of a regular physical exam. There are many other reasons that a doctor may want this blood test, including to:  · Find the cause of symptoms such as fatigue, weakness, fever, bruising, or weight loss. · Find anemia or an infection. · See how much blood has been lost if there is bleeding. · Diagnose diseases of the blood, such as leukemia or polycythemia. How can you prepare for the test?  You do not need to do anything before having this test.  What happens during the test?  The health professional taking a sample of your blood will:  · Wrap an elastic band around your upper arm. This makes the veins below the band larger so it is easier to put a needle into the vein. · Clean the needle site with alcohol. · Put the needle into the vein. · Attach a tube to the needle to fill it with blood. · Remove the band from your arm when enough blood is collected. · Put a gauze pad or cotton ball over the needle site as the needle is removed. · Put pressure on the site and then put on a bandage. If this blood test is done on a baby, a heel stick may be done instead of a blood draw from a vein. What happens after the test?  · You will probably be able to go home right away. · You can go back to your usual activities right away. Follow-up care is a key part of your treatment and safety. Be sure to make and go to all appointments, and call your doctor if you are having problems. It's also a good idea to keep a list of the medicines you take. Ask your doctor when you can expect to have your test results. Where can you learn more? Go to http://cleo-monika.info/.   Enter J468 in the search box to learn more about \"Complete Blood Count (CBC): About This Test.\"  Current as of: October 14, 2016  Content Version: 11.4  © 3094-7409 Healthwise, Incorporated. Care instructions adapted under license by [employer] (which disclaims liability or warranty for this information). If you have questions about a medical condition or this instruction, always ask your healthcare professional. [place] [address] any warranty or liability for your use of this information.

## 2018-06-07 LAB
ALBUMIN SERPL ELPH-MCNC: 3.9 G/DL (ref 2.9–4.4)
ALBUMIN SERPL-MCNC: 4.8 G/DL (ref 3.6–4.8)
ALBUMIN/GLOB SERPL: 1.1 {RATIO} (ref 0.7–1.7)
ALBUMIN/GLOB SERPL: 1.8 {RATIO} (ref 1.2–2.2)
ALP SERPL-CCNC: 93 IU/L (ref 39–117)
ALPHA1 GLOB SERPL ELPH-MCNC: 0.2 G/DL (ref 0–0.4)
ALPHA2 GLOB SERPL ELPH-MCNC: 0.9 G/DL (ref 0.4–1)
ALT SERPL-CCNC: 15 IU/L (ref 0–44)
AST SERPL-CCNC: 20 IU/L (ref 0–40)
B-GLOBULIN SERPL ELPH-MCNC: 1.6 G/DL (ref 0.7–1.3)
BILIRUB SERPL-MCNC: <0.2 MG/DL (ref 0–1.2)
BUN SERPL-MCNC: 24 MG/DL (ref 8–27)
BUN/CREAT SERPL: 23 (ref 10–24)
CALCIUM SERPL-MCNC: 9.7 MG/DL (ref 8.6–10.2)
CHLORIDE SERPL-SCNC: 94 MMOL/L (ref 96–106)
CO2 SERPL-SCNC: 23 MMOL/L (ref 18–29)
CREAT SERPL-MCNC: 1.06 MG/DL (ref 0.76–1.27)
EPO SERPL-ACNC: 17.1 MIU/ML (ref 2.6–18.5)
FERRITIN SERPL-MCNC: 105 NG/ML (ref 30–400)
GAMMA GLOB SERPL ELPH-MCNC: 1 G/DL (ref 0.4–1.8)
GFR SERPLBLD CREATININE-BSD FMLA CKD-EPI: 75 ML/MIN/1.73
GFR SERPLBLD CREATININE-BSD FMLA CKD-EPI: 87 ML/MIN/1.73
GLOBULIN SER CALC-MCNC: 2.7 G/DL (ref 1.5–4.5)
GLOBULIN SER CALC-MCNC: 3.6 G/DL (ref 2.2–3.9)
GLUCOSE SERPL-MCNC: 218 MG/DL (ref 65–99)
IRON SATN MFR SERPL: 18 % (ref 15–55)
IRON SERPL-MCNC: 63 UG/DL (ref 38–169)
M PROTEIN SERPL ELPH-MCNC: ABNORMAL G/DL
PLEASE NOTE, 011150: ABNORMAL
POTASSIUM SERPL-SCNC: 4.4 MMOL/L (ref 3.5–5.2)
PROT SERPL-MCNC: 7.5 G/DL (ref 6–8.5)
SODIUM SERPL-SCNC: 134 MMOL/L (ref 134–144)
TIBC SERPL-MCNC: 347 UG/DL (ref 250–450)
UIBC SERPL-MCNC: 284 UG/DL (ref 111–343)

## 2018-06-22 ENCOUNTER — OFFICE VISIT (OUTPATIENT)
Dept: ONCOLOGY | Age: 63
End: 2018-06-22

## 2018-06-22 VITALS
WEIGHT: 197.4 LBS | SYSTOLIC BLOOD PRESSURE: 129 MMHG | HEART RATE: 89 BPM | DIASTOLIC BLOOD PRESSURE: 70 MMHG | TEMPERATURE: 98.7 F | BODY MASS INDEX: 29.15 KG/M2

## 2018-06-22 DIAGNOSIS — D64.9 CHRONIC ANEMIA: Primary | ICD-10-CM

## 2018-06-22 DIAGNOSIS — D50.8 IRON DEFICIENCY ANEMIA SECONDARY TO INADEQUATE DIETARY IRON INTAKE: ICD-10-CM

## 2018-06-22 NOTE — PROGRESS NOTES
Hematology/medical oncology progress note    22 2018  Kim Velazco  YOB: 1955    PCP: Dr. Evangelina Trejo    Diagnosis: Chronic anemia:    Mr. Ismael Guerra is a 70-year-old male who is referred for evaluation of anemia. I explained to the patient that on the day of his initial consultation, 6/5/2018 he had a WBC count of 4.3, hemoglobin is 11.9 g/dL, hematocrit 33.6%, and the platelet count is 16,770. I have explained to the patient that his anemia is quite mild. Iron studies revealed that he has a normal ferritin of 105 ng/mL, iron saturation is normal at 18%, and his iron level is 63 mcg/dL which is also normal.  On further review his BUN and creatinine were normal at 24 and 1.06 respectively. Liver enzymes were normal as well. I have explained to the patient that he does not have a significant degree of anemia. This is very mild borderline anemia. Therefore I will re-evaluate him in about 3 months. He may take Slow Fe 1 tablet daily in the future. The patient had his questions answered to his satisfaction. Total time 30 minutes, greater than 50% of the time was in counseling and coordination of care. Velasquez Gan MD, [address]

## 2018-06-22 NOTE — PATIENT INSTRUCTIONS
Anemia: Care Instructions  Your Care Instructions    Anemia is a low level of red blood cells, which carry oxygen throughout your body. Many things can cause anemia. Lack of iron is one of the most common causes. Your body needs iron to make hemoglobin, a substance in red blood cells that carries oxygen from the lungs to your body's cells. Without enough iron, the body produces fewer and smaller red blood cells. As a result, your body's cells do not get enough oxygen, and you feel tired and weak. And you may have trouble concentrating. Bleeding is the most common cause of a lack of iron. You may have heavy menstrual bleeding or bleeding caused by conditions such as ulcers, hemorrhoids, or cancer. Regular use of aspirin or other anti-inflammatory medicines (such as ibuprofen) also can cause bleeding in some people. A lack of iron in your diet also can cause anemia, especially at times when the body needs more iron, such as during pregnancy, infancy, and the teen years. Your doctor may have prescribed iron pills. It may take several months of treatment for your iron levels to return to normal. Your doctor also may suggest that you eat foods that are rich in iron, such as meat and beans. There are many other causes of anemia. It is not always due to a lack of iron. Finding the specific cause of your anemia will help your doctor find the right treatment for you. Follow-up care is a key part of your treatment and safety. Be sure to make and go to all appointments, and call your doctor if you are having problems. It's also a good idea to know your test results and keep a list of the medicines you take. How can you care for yourself at home? · Take your medicines exactly as prescribed. Call your doctor if you think you are having a problem with your medicine. · If your doctor recommends iron pills, take them as directed:  ¨ Try to take the pills on an empty stomach about 1 hour before or 2 hours after meals. But you may need to take iron with food to avoid an upset stomach. ¨ Do not take antacids or drink milk or caffeine drinks (such as coffee, tea, or cola) at the same time or within 2 hours of the time that you take your iron. They can make it hard for your body to absorb the iron. ¨ Vitamin C (from food or supplements) helps your body absorb iron. Try taking iron pills with a glass of orange juice or some other food that is high in vitamin C, such as citrus fruits. ¨ Iron pills may cause stomach problems, such as heartburn, nausea, diarrhea, constipation, and cramps. Be sure to drink plenty of fluids, and include fruits, vegetables, and fiber in your diet each day. Iron pills often make your bowel movements dark or green. ¨ If you forget to take an iron pill, do not take a double dose of iron the next time you take a pill. ¨ Keep iron pills out of the reach of small children. An overdose of iron can be very dangerous. · Follow your doctor's advice about eating iron-rich foods. These include red meat, shellfish, poultry, eggs, beans, raisins, whole-grain bread, and leafy green vegetables. · Steam vegetables to help them keep their iron content. When should you call for help? Call 911 anytime you think you may need emergency care. For example, call if:  ? · You have symptoms of a heart attack. These may include:  ¨ Chest pain or pressure, or a strange feeling in the chest.  ¨ Sweating. ¨ Shortness of breath. ¨ Nausea or vomiting. ¨ Pain, pressure, or a strange feeling in the back, neck, jaw, or upper belly or in one or both shoulders or arms. ¨ Lightheadedness or sudden weakness. ¨ A fast or irregular heartbeat. After you call 911, the  may tell you to chew 1 adult-strength or 2 to 4 low-dose aspirin. Wait for an ambulance. Do not try to drive yourself. ? · You passed out (lost consciousness).    ?Call your doctor now or seek immediate medical care if:  ? · You have new or increased shortness of breath. ? · You are dizzy or lightheaded, or you feel like you may faint. ? · Your fatigue and weakness continue or get worse. ? · You have any abnormal bleeding, such as:  ¨ Nosebleeds. ¨ Vaginal bleeding that is different (heavier, more frequent, at a different time of the month) than what you are used to. ¨ Bloody or black stools, or rectal bleeding. ¨ Bloody or pink urine. ? Watch closely for changes in your health, and be sure to contact your doctor if:  ? · You do not get better as expected. Where can you learn more? Go to http://cleo-monika.info/. Enter R301 in the search box to learn more about \"Anemia: Care Instructions. \"  Current as of: October 13, 2016  Content Version: 11.4  © 1953-1740 [employer]. Care instructions adapted under license by TM (which disclaims liability or warranty for this information). If you have questions about a medical condition or this instruction, always ask your healthcare professional. [place] [address] any warranty or liability for your use of this information.

## 2018-06-22 NOTE — MR AVS SNAPSHOT
Jazzmine Gordillo 
 
 
 [facility] [address] [facility] [address] 
[phone] Patient: Cecy Gurrola MRN: [id number] HGW:[id number] Visit Information Date & Time Provider Department Dept. Phone Encounter #  
 6/22/2018 10:00 AM MD [facility]  [phone] [id number] Follow-up Instructions Return in about 3 months (around 9/22/2018). Your Appointments 9/25/2018 10:15 AM  
Office Visit with MD [facility]  [facility]) Appt Note: OV  
 [facility] [address] [facility] [address]  
[phone]  
  
   
 [facility] [address] Upcoming Health Maintenance Date Due Hepatitis C Screening 1955 DTaP/Tdap/Td series (1 - Tdap) 7/12/1976 FOBT Q 1 YEAR AGE 50-75 7/12/2005 ZOSTER VACCINE AGE 60> 5/12/2015 Pneumococcal 19-64 Highest Risk (2 of 3 - PCV13) 12/5/2017 MEDICARE YEARLY EXAM 3/14/2018 Influenza Age 5 to Adult 8/1/2018 Allergies as of 6/22/2018  Review Complete On: 6/22/2018 By: Sergei Scott MD  
 No Known Allergies Current Immunizations  Never Reviewed No immunizations on file. Not reviewed this visit You Were Diagnosed With   
  
 Codes Comments Chronic anemia    -  Primary ICD-10-CM: D64.9 ICD-9-CM: 285.9 Iron deficiency anemia secondary to inadequate dietary iron intake     ICD-10-CM: D50.8 ICD-9-CM: 280.1 Vitals BP Pulse Temp Weight(growth percentile) BMI Smoking Status 129/70 89 98.7 °F (37.1 °C) (Oral) 197 lb 6.4 oz (89.5 kg) 29.15 kg/m2 Former Smoker BMI and BSA Data Body Mass Index Body Surface Area  
 29.15 kg/m 2 2.09 m 2 Preferred Pharmacy Pharmacy Name Phone [address] [facility], [address] [facility] Your Updated Medication List  
  
   
This list is accurate as of 6/22/18 10:21 AM.  Always use your most recent med list.  
  
  
  
  
 cyclobenzaprine 10 mg tablet Commonly known as:  FLEXERIL Take 1 Tab by mouth three (3) times daily as needed for Muscle Spasm(s). diclofenac EC 75 mg EC tablet Commonly known as:  VOLTAREN Take 1 Tab by mouth two (2) times daily (with meals). donepezil 10 mg tablet Commonly known as:  ARICEPT Take 10 mg by mouth nightly.  
  
 gabapentin 300 mg capsule Commonly known as:  NEURONTIN  
1 in the am and 2 in the pm - taper up as directed  
  
 glimepiride 2 mg tablet Commonly known as:  AMARYL Take  by mouth every morning. glipiZIDE 10 mg tablet Commonly known as:  Sanabria Sean Take 10 mg by mouth two (2) times a day. indomethacin 25 mg capsule Commonly known as:  INDOCIN Take  by mouth three (3) times daily. lisinopril 20 mg tablet Commonly known as:  Velora Hemal Take 20 mg by mouth daily. LUNESTA 3 mg tablet Generic drug:  eszopiclone Take 3 mg by mouth nightly. metFORMIN 1,000 mg tablet Commonly known as:  GLUCOPHAGE Take 1,000 mg by mouth two (2) times daily (with meals). polyethylene glycol 17 gram packet Commonly known as:  Silverio Camden Take 1 Packet by mouth daily. PRISTIQ 50 mg ER tablet Generic drug:  desvenlafaxine succinate Take 50 mg by mouth as needed. promethazine 25 mg tablet Commonly known as:  PHENERGAN Take 1 Tab by mouth every six (6) hours as needed for Nausea. Indications: PREVENTION OF POST-OPERATIVE NAUSEA AND VOMITING  
  
 simvastatin 10 mg tablet Commonly known as:  ZOCOR Take  by mouth nightly. traMADol 50 mg tablet Commonly known as:  ULTRAM  
Take 1 Tab by mouth every six (6) hours as needed for Pain. Max Daily Amount: 200 mg. Follow-up Instructions Return in about 3 months (around 9/22/2018). Patient Instructions Anemia: Care Instructions Your Care Instructions Anemia is a low level of red blood cells, which carry oxygen throughout your body. Many things can cause anemia. Lack of iron is one of the most common causes. Your body needs iron to make hemoglobin, a substance in red blood cells that carries oxygen from the lungs to your body's cells. Without enough iron, the body produces fewer and smaller red blood cells. As a result, your body's cells do not get enough oxygen, and you feel tired and weak. And you may have trouble concentrating. Bleeding is the most common cause of a lack of iron. You may have heavy menstrual bleeding or bleeding caused by conditions such as ulcers, hemorrhoids, or cancer. Regular use of aspirin or other anti-inflammatory medicines (such as ibuprofen) also can cause bleeding in some people. A lack of iron in your diet also can cause anemia, especially at times when the body needs more iron, such as during pregnancy, infancy, and the teen years. Your doctor may have prescribed iron pills. It may take several months of treatment for your iron levels to return to normal. Your doctor also may suggest that you eat foods that are rich in iron, such as meat and beans. There are many other causes of anemia. It is not always due to a lack of iron. Finding the specific cause of your anemia will help your doctor find the right treatment for you. Follow-up care is a key part of your treatment and safety. Be sure to make and go to all appointments, and call your doctor if you are having problems. It's also a good idea to know your test results and keep a list of the medicines you take. How can you care for yourself at home? · Take your medicines exactly as prescribed. Call your doctor if you think you are having a problem with your medicine. · If your doctor recommends iron pills, take them as directed: ¨ Try to take the pills on an empty stomach about 1 hour before or 2 hours after meals. But you may need to take iron with food to avoid an upset stomach. ¨ Do not take antacids or drink milk or caffeine drinks (such as coffee, tea, or cola) at the same time or within 2 hours of the time that you take your iron. They can make it hard for your body to absorb the iron. ¨ Vitamin C (from food or supplements) helps your body absorb iron. Try taking iron pills with a glass of orange juice or some other food that is high in vitamin C, such as citrus fruits. ¨ Iron pills may cause stomach problems, such as heartburn, nausea, diarrhea, constipation, and cramps. Be sure to drink plenty of fluids, and include fruits, vegetables, and fiber in your diet each day. Iron pills often make your bowel movements dark or green. ¨ If you forget to take an iron pill, do not take a double dose of iron the next time you take a pill. ¨ Keep iron pills out of the reach of small children. An overdose of iron can be very dangerous. · Follow your doctor's advice about eating iron-rich foods. These include red meat, shellfish, poultry, eggs, beans, raisins, whole-grain bread, and leafy green vegetables. · Steam vegetables to help them keep their iron content. When should you call for help? Call 911 anytime you think you may need emergency care. For example, call if: 
? · You have symptoms of a heart attack. These may include: ¨ Chest pain or pressure, or a strange feeling in the chest. 
¨ Sweating. ¨ Shortness of breath. ¨ Nausea or vomiting. ¨ Pain, pressure, or a strange feeling in the back, neck, jaw, or upper belly or in one or both shoulders or arms. ¨ Lightheadedness or sudden weakness. ¨ A fast or irregular heartbeat. After you call 911, the  may tell you to chew 1 adult-strength or 2 to 4 low-dose aspirin. Wait for an ambulance. Do not try to drive yourself. ? · You passed out (lost consciousness). ?Call your doctor now or seek immediate medical care if: 
? · You have new or increased shortness of breath. ? · You are dizzy or lightheaded, or you feel like you may faint. ? · Your fatigue and weakness continue or get worse. ? · You have any abnormal bleeding, such as: 
¨ Nosebleeds. ¨ Vaginal bleeding that is different (heavier, more frequent, at a different time of the month) than what you are used to. ¨ Bloody or black stools, or rectal bleeding. ¨ Bloody or pink urine. ? Watch closely for changes in your health, and be sure to contact your doctor if: 
? · You do not get better as expected. Where can you learn more? Go to http://cleo-monika.info/. Enter R301 in the search box to learn more about \"Anemia: Care Instructions. \" Current as of: October 13, 2016 Content Version: 11.4 © 8736-5873 [employer]. Care instructions adapted under license by "[employer] (which disclaims liability or warranty for this information). If you have questions about a medical condition or this instruction, always ask your healthcare professional. [address] any warranty or liability for your use of this information. Introducing [facility]! Oz Juarez introduces Brightpearl patient portal. Now you can access parts of your medical record, email your doctor's office, and request medication refills online. 1. In your internet browser, go to [email or web address] Navigation 2. Click on the First Time User? Click Here link in the Sign In box. You will see the New Member Sign Up page. 3. Enter your Brightpearl Access Code exactly as it appears below. You will not need to use this code after youve completed the sign-up process. If you do not sign up before the expiration date, you must request a new code. · Brightpearl Access Code: [email or web address] Expires: 9/3/2018 12:04 PM 
 
 4. Enter the last four digits of your Social Security Number (xxxx) and Date of Birth (mm/dd/yyyy) as indicated and click Submit. You will be taken to the next sign-up page. 5. Create a Upstart ID. This will be your Upstart login ID and cannot be changed, so think of one that is secure and easy to remember. 6. Create a Upstart password. You can change your password at any time. 7. Enter your Password Reset Question and Answer. This can be used at a later time if you forget your password. 8. Enter your e-mail address. You will receive e-mail notification when new information is available in [address] 9. Click Sign Up. You can now view and download portions of your medical record. 10. Click the Download Summary menu link to download a portable copy of your medical information. If you have questions, please visit the Frequently Asked Questions section of the Upstart website. Remember, Upstart is NOT to be used for urgent needs. For medical emergencies, dial 911. Now available from your iPhone and Android! Please provide this summary of care documentation to your next provider. Your primary care clinician is listed as Yan Loera. If you have any questions after today's visit, please call [phone].

## 2018-10-19 ENCOUNTER — OFFICE VISIT (OUTPATIENT)
Dept: ONCOLOGY | Age: 63
End: 2018-10-19

## 2018-10-19 ENCOUNTER — HOSPITAL ENCOUNTER (OUTPATIENT)
Dept: ONCOLOGY | Age: 63
Discharge: HOME | End: 2018-10-19

## 2018-10-19 VITALS
RESPIRATION RATE: 16 BRPM | HEART RATE: 80 BPM | SYSTOLIC BLOOD PRESSURE: 128 MMHG | TEMPERATURE: 97.8 F | BODY MASS INDEX: 29.62 KG/M2 | OXYGEN SATURATION: 100 % | DIASTOLIC BLOOD PRESSURE: 77 MMHG | HEIGHT: 69 IN | WEIGHT: 200 LBS

## 2018-10-19 DIAGNOSIS — D64.9 CHRONIC ANEMIA: ICD-10-CM

## 2018-10-19 DIAGNOSIS — D64.9 CHRONIC ANEMIA: Primary | ICD-10-CM

## 2018-10-19 LAB
BASO+EOS+MONOS # BLD AUTO: 0.4 K/UL (ref 0–2.3)
BASO+EOS+MONOS # BLD AUTO: 8 % (ref 0.1–17)
DIFFERENTIAL METHOD BLD: ABNORMAL
ERYTHROCYTE [DISTWIDTH] IN BLOOD BY AUTOMATED COUNT: 12.9 % (ref 11.5–14.5)
HCT VFR BLD AUTO: 29.1 % (ref 36–48)
HGB BLD-MCNC: 9.9 G/DL (ref 12–16)
LYMPHOCYTES # BLD: 1.4 K/UL (ref 1.1–5.9)
LYMPHOCYTES NFR BLD: 30 % (ref 14–44)
MCH RBC QN AUTO: 30.1 PG (ref 25–35)
MCHC RBC AUTO-ENTMCNC: 34 G/DL (ref 31–37)
MCV RBC AUTO: 88.4 FL (ref 78–102)
NEUTS SEG # BLD: 2.9 K/UL (ref 1.8–9.5)
NEUTS SEG NFR BLD: 62 % (ref 40–70)
PLATELET # BLD AUTO: 245 K/UL (ref 140–440)
RBC # BLD AUTO: 3.29 M/UL (ref 4.1–5.1)
WBC # BLD AUTO: 4.7 K/UL (ref 4.5–13)

## 2018-10-19 NOTE — PROGRESS NOTES
Hematology/Oncology  Progress Note Name: J Luis Frye Date: 10/19/2018 : 1955 Ramona Mc MD  
 
Mr. Tameka Malone is a 61y.o. year old male who was seen for chronic anemia. Subjective:  
 
Mr. Tameka Malone is a 60-year-old -American man who states that over the past 6 months he has noticed slowly progressive fatigue and some weakness. He denies having any gastrointestinal genitourinary blood loss. On  he was referred for an assessment of his anemia. His WBC count was 4.3, hemoglobin is 11.9 g/dL, hematocrit 33.6%, and the platelet count is 02,465. Iron studies revealed that he has a normal ferritin of 105 ng/mL, iron saturation is normal at 18%, and his iron level is 63 mcg/dL which is also normal.  On further review his BUN and creatinine were normal at 24 and 1.06 respectively. Liver enzymes were normal as well. He denies shortness of breath, dizziness, and chest pains. He does not have any other complaints to report at this time. Past medical history, family history, and social history: these were reviewed and remains unchanged. Past Medical History:  
Diagnosis Date  Chronic kidney disease  Diabetes mellitus  Essential hypertension  Kidney disease  Prostate cancer ([facility] [address] Past Surgical History:  
Procedure Laterality Date [address]  HX OTHER SURGICAL   Prostate Sx r/t cancer (seed implant)  HX OTHER SURGICAL    
 shoulder surgery Social History Socioeconomic History  Marital status:  Spouse name: Not on file  Number of children: Not on file  Years of education: Not on file  Highest education level: Not on file Social Needs  Financial resource strain: Not on file  Food insecurity - worry: Not on file  Food insecurity - inability: Not on file  Transportation needs - medical: Not on file  Transportation needs - non-medical: Not on file Occupational History  Not on file Tobacco Use  Smoking status: Former Smoker Packs/day: 0.25  Smokeless tobacco: Never Used  Tobacco comment: Patient quit in 2011, and started back. Light Smoker now Substance and Sexual Activity  Alcohol use: No  
  Alcohol/week: 0.0 oz  Drug use: No  
 Sexual activity: Not on file Other Topics Concern  Not on file Social History Narrative  Not on file Family History Problem Relation Age of Onset  Diabetes Father  No Known Problems Brother  Other Brother PE  
 
Current Outpatient Medications Medication Sig Dispense Refill  traMADol (ULTRAM) 50 mg tablet Take 1 Tab by mouth every six (6) hours as needed for Pain. Max Daily Amount: 200 mg. 40 Tab 0  cyclobenzaprine (FLEXERIL) 10 mg tablet Take 1 Tab by mouth three (3) times daily as needed for Muscle Spasm(s). 60 Tab 0  
 metFORMIN (GLUCOPHAGE) 1,000 mg tablet Take 1,000 mg by mouth two (2) times daily (with meals).  promethazine (PHENERGAN) 25 mg tablet Take 1 Tab by mouth every six (6) hours as needed for Nausea. Indications: PREVENTION OF POST-OPERATIVE NAUSEA AND VOMITING 40 Tab 0  
 polyethylene glycol (MIRALAX) 17 gram packet Take 1 Packet by mouth daily. 30 Packet 1  
 indomethacin (INDOCIN) 25 mg capsule Take  by mouth three (3) times daily.  diclofenac EC (VOLTAREN) 75 mg EC tablet Take 1 Tab by mouth two (2) times daily (with meals). 60 Tab 2  
 gabapentin (NEURONTIN) 300 mg capsule 1 in the am and 2 in the pm - taper up as directed 90 Cap 2  
 simvastatin (ZOCOR) 10 mg tablet Take  by mouth nightly.  glimepiride (AMARYL) 2 mg tablet Take  by mouth every morning.  PRISTIQ 50 mg tablet Take 50 mg by mouth as needed.  donepezil (ARICEPT) 10 mg tablet Take 10 mg by mouth nightly.  LUNESTA 3 mg tablet Take 3 mg by mouth nightly.  glipiZIDE (GLUCOTROL) 10 mg tablet Take 10 mg by mouth two (2) times a day.  lisinopril (PRINIVIL, ZESTRIL) 20 mg tablet Take 20 mg by mouth daily. Review of Systems Constitutional: The patient has no acute distress or discomfort. HEENT: The patient denies recent head trauma, eye pain, blurred vision,  hearing deficit, oropharyngeal mucosal pain or lesions, and the patient denies throat pain or discomfort. Lymphatics: The patient denies palpable peripheral lymphadenopathy. Hematologic: The patient denies having bruising, bleeding, or progressive fatigue. Respiratory: Patient denies having shortness of breath, cough, sputum production, fever, or dyspnea on exertion. Cardiovascular: The patient denies having leg pain, leg swelling, heart palpitations, chest permit, chest pain, or lightheadedness. The patient denies having dyspnea on exertion. Gastrointestinal: The patient denies having nausea, emesis, or diarrhea. The patient denies having any hematemesis or blood in the stool. Genitourinary: Patient denies having urinary urgency, frequency, or dysuria. The patient denies having blood in the urine. Psychological: The patient denies having symptoms of nervousness, anxiety, depression, or thoughts of harming self. Skin: Patient denies having skin rashes, skin, ulcerations, or unexplained itching or pruritus. Musculoskeletal: The patient denies having pain in the joints or bones. Objective:  
 
Visit Vitals /77 Pulse 80 Temp 97.8 °F (36.6 °C) (Oral) Resp 16 Ht 5' 9\" (1.753 m) Wt 90.7 kg (200 lb) SpO2 100% BMI 29.53 kg/m² ECOG PS=0; pain score=0/10 Physical Exam:  
Gen. Appearance: The patient is in no acute distress. Skin: There is no bruise or rash. HEENT: The exam is unremarkable. Neck: Supple without lymphadenopathy or thyromegaly. Lungs: Clear to auscultation and percussion; there are no wheezes or rhonchi. Heart: Regular rate and rhythm; there are no murmurs, gallops, or rubs. Abdomen:  Bowel sounds are present and normal.  There is no guarding, tenderness, or hepatosplenomegaly. Extremities: There is no clubbing, cyanosis, or edema. Neurologic: There are no focal neurologic deficits. Lymphatics: There is no palpable peripheral lymphadenopathy. Musculoskeletal: The patient has full range of motion at all joints. There is no evidence of joint deformity or effusions. There is no focal joint tenderness. Psychological/psychiatric: There is no clinical evidence of anxiety, depression, or melancholy. Lab data: 
   
Results for orders placed or performed during the hospital encounter of 10/19/18 CBC WITH 3 PART DIFF     Status: Abnormal  
Result Value Ref Range Status WBC 4.7 4.5 - 13.0 K/uL Final  
 RBC 3.29 (L) 4.10 - 5.10 M/uL Final  
 HGB 9.9 (L) 12.0 - 16.0 g/dL Final  
 HCT 29.1 (L) 36 - 48 % Final  
 MCV 88.4 78 - 102 FL Final  
 MCH 30.1 25.0 - 35.0 PG Final  
 MCHC 34.0 31 - 37 g/dL Final  
 RDW 12.9 11.5 - 14.5 % Final  
 PLATELET 216 368 - 818 K/uL Final  
 NEUTROPHILS 62 40 - 70 % Final  
 MIXED CELLS 8 0.1 - 17 % Final  
 LYMPHOCYTES 30 14 - 44 % Final  
 ABS. NEUTROPHILS 2.9 1.8 - 9.5 K/UL Final  
 ABS. MIXED CELLS 0.4 0.0 - 2.3 K/uL Final  
 ABS. LYMPHOCYTES 1.4 1.1 - 5.9 K/UL Final  
  Comment: Test performed at [address]. Results Reviewed by Medical Director. DF AUTOMATED   Final  
 
 
   
Assessment: 1. Chronic anemia Plan:  
Chronic Anemia: Today his CBC reveals his WBC is 4.7K/uL, hemoglobin of 9.9g/dL, hematocrit is 29.1% and platelet count is 903X/LR. His BUN and creatine are normal at 24 and 1.06 respectively. His Ferritin level is also normal at 105. His hemoglobin and hematocrit is dropping therefore we explained to the patient that we need to schedule a bone marrow biopsy to determine if he is developing any bone marrow disease such as myelodysplastic syndrome.  The patient had his questions answered to his satisfaction. He agreed to the plan therefore will be scheduled to undergo bone marrow biopsy as soon as possible. F/U in 3 weeks. Orders Placed This Encounter  COMPLETE CBC & AUTO DIFF WBC  IR BX BONE MARROW DIAGNOSTIC Please collect: Clot, Core, Slides, Touch Prep, Cytogenetics and Cytometry Standing Status:   Future Standing Expiration Date:   11/19/2019 Scheduling Instructions:  
   Please schedule at [facility]  
  Order Specific Question:   Transport Answer:   Ambulatory [1] Order Specific Question:   Reason for Exam  
  Answer:   D64.9  InHouse CBC ([place]) Standing Status:   Future Number of Occurrences:   1 Standing Expiration Date:   10/26/2018  IRON PROFILE Standing Status:   Future Standing Expiration Date:   10/20/2019  METABOLIC PANEL, COMPREHENSIVE Standing Status:   Future Standing Expiration Date:   10/20/2019  FERRITIN Standing Status:   Future Standing Expiration Date:   10/20/2019 Abhay Jacobson NP 
10/19/2018 I have assessed the patient independently and  agree with the full assessment as outlined.  
Princess Modesta MD, Celina Mayo

## 2018-10-19 NOTE — PATIENT INSTRUCTIONS
Anemia: Care Instructions Your Care Instructions Anemia is a low level of red blood cells, which carry oxygen throughout your body. Many things can cause anemia. Lack of iron is one of the most common causes. Your body needs iron to make hemoglobin, a substance in red blood cells that carries oxygen from the lungs to your body's cells. Without enough iron, the body produces fewer and smaller red blood cells. As a result, your body's cells do not get enough oxygen, and you feel tired and weak. And you may have trouble concentrating. Bleeding is the most common cause of a lack of iron. You may have heavy menstrual bleeding or bleeding caused by conditions such as ulcers, hemorrhoids, or cancer. Regular use of aspirin or other anti-inflammatory medicines (such as ibuprofen) also can cause bleeding in some people. A lack of iron in your diet also can cause anemia, especially at times when the body needs more iron, such as during pregnancy, infancy, and the teen years. Your doctor may have prescribed iron pills. It may take several months of treatment for your iron levels to return to normal. Your doctor also may suggest that you eat foods that are rich in iron, such as meat and beans. There are many other causes of anemia. It is not always due to a lack of iron. Finding the specific cause of your anemia will help your doctor find the right treatment for you. Follow-up care is a key part of your treatment and safety. Be sure to make and go to all appointments, and call your doctor if you are having problems. It's also a good idea to know your test results and keep a list of the medicines you take. How can you care for yourself at home? · Take your medicines exactly as prescribed. Call your doctor if you think you are having a problem with your medicine. · If your doctor recommends iron pills, take them as directed: ? Try to take the pills on an empty stomach about 1 hour before or 2 hours after meals. But you may need to take iron with food to avoid an upset stomach. ? Do not take antacids or drink milk or caffeine drinks (such as coffee, tea, or cola) at the same time or within 2 hours of the time that you take your iron. They can make it hard for your body to absorb the iron. ? Vitamin C (from food or supplements) helps your body absorb iron. Try taking iron pills with a glass of orange juice or some other food that is high in vitamin C, such as citrus fruits. ? Iron pills may cause stomach problems, such as heartburn, nausea, diarrhea, constipation, and cramps. Be sure to drink plenty of fluids, and include fruits, vegetables, and fiber in your diet each day. Iron pills often make your bowel movements dark or green. ? If you forget to take an iron pill, do not take a double dose of iron the next time you take a pill. ? Keep iron pills out of the reach of small children. An overdose of iron can be very dangerous. · Follow your doctor's advice about eating iron-rich foods. These include red meat, shellfish, poultry, eggs, beans, raisins, whole-grain bread, and leafy green vegetables. · Steam vegetables to help them keep their iron content. When should you call for help? Call 911 anytime you think you may need emergency care. For example, call if: 
  · You have symptoms of a heart attack. These may include: 
? Chest pain or pressure, or a strange feeling in the chest. 
? Sweating. ? Shortness of breath. ? Nausea or vomiting. ? Pain, pressure, or a strange feeling in the back, neck, jaw, or upper belly or in one or both shoulders or arms. ? Lightheadedness or sudden weakness. ? A fast or irregular heartbeat. After you call 911, the  may tell you to chew 1 adult-strength or 2 to 4 low-dose aspirin. Wait for an ambulance. Do not try to drive yourself.  
  · You passed out (lost consciousness).  
 Call your doctor now or seek immediate medical care if:   · You have new or increased shortness of breath.  
  · You are dizzy or lightheaded, or you feel like you may faint.  
  · Your fatigue and weakness continue or get worse.  
  · You have any abnormal bleeding, such as: 
? Nosebleeds. ? Vaginal bleeding that is different (heavier, more frequent, at a different time of the month) than what you are used to. 
? Bloody or black stools, or rectal bleeding. ? Bloody or pink urine.  
 Watch closely for changes in your health, and be sure to contact your doctor if: 
  · You do not get better as expected. Where can you learn more? Go to http://cleo-monika.info/. Enter R301 in the search box to learn more about \"Anemia: Care Instructions. \" Current as of: May 7, 2018 Content Version: 11.8 © 0456-6450 [employer]wise, Incorporated. Care instructions adapted under license by [employer] (which disclaims liability or warranty for this information). If you have questions about a medical condition or this instruction, always ask your healthcare professional. [place] [address] any warranty or liability for your use of this information.

## 2018-10-22 LAB
ALBUMIN SERPL-MCNC: 4.3 G/DL (ref 3.6–4.8)
ALBUMIN/GLOB SERPL: 1.7 {RATIO} (ref 1.2–2.2)
ALP SERPL-CCNC: 76 IU/L (ref 39–117)
ALT SERPL-CCNC: 19 IU/L (ref 0–44)
AST SERPL-CCNC: 22 IU/L (ref 0–40)
BILIRUB SERPL-MCNC: <0.2 MG/DL (ref 0–1.2)
BUN SERPL-MCNC: 19 MG/DL (ref 8–27)
BUN/CREAT SERPL: 17 (ref 10–24)
CALCIUM SERPL-MCNC: 9.5 MG/DL (ref 8.6–10.2)
CHLORIDE SERPL-SCNC: 98 MMOL/L (ref 96–106)
CO2 SERPL-SCNC: 24 MMOL/L (ref 20–29)
CREAT SERPL-MCNC: 1.14 MG/DL (ref 0.76–1.27)
FERRITIN SERPL-MCNC: 168 NG/ML (ref 30–400)
GLOBULIN SER CALC-MCNC: 2.6 G/DL (ref 1.5–4.5)
GLUCOSE SERPL-MCNC: 306 MG/DL (ref 65–99)
IRON SATN MFR SERPL: 16 % (ref 15–55)
IRON SERPL-MCNC: 53 UG/DL (ref 38–169)
POTASSIUM SERPL-SCNC: 5 MMOL/L (ref 3.5–5.2)
PROT SERPL-MCNC: 6.9 G/DL (ref 6–8.5)
SODIUM SERPL-SCNC: 138 MMOL/L (ref 134–144)
TIBC SERPL-MCNC: 341 UG/DL (ref 250–450)
UIBC SERPL-MCNC: 288 UG/DL (ref 111–343)

## 2018-11-05 ENCOUNTER — HOSPITAL ENCOUNTER (OUTPATIENT)
Dept: INTERVENTIONAL RADIOLOGY/VASCULAR | Age: 63
Discharge: HOME | End: 2018-11-05
Attending: NURSE PRACTITIONER | Admitting: RADIOLOGY
Payer: MEDICARE

## 2018-11-05 VITALS
HEIGHT: 69 IN | OXYGEN SATURATION: 97 % | WEIGHT: 200 LBS | HEART RATE: 66 BPM | BODY MASS INDEX: 29.62 KG/M2 | DIASTOLIC BLOOD PRESSURE: 60 MMHG | SYSTOLIC BLOOD PRESSURE: 145 MMHG | RESPIRATION RATE: 8 BRPM

## 2018-11-05 DIAGNOSIS — D64.9 CHRONIC ANEMIA: ICD-10-CM

## 2018-11-05 LAB
ANION GAP SERPL CALC-SCNC: 4 MMOL/L (ref 3–18)
APTT PPP: 21.1 SEC (ref 23–36.4)
BASOPHILS # BLD: 0 K/UL (ref 0–0.1)
BASOPHILS NFR BLD: 1 % (ref 0–2)
BUN SERPL-MCNC: 16 MG/DL (ref 7–18)
BUN/CREAT SERPL: 15 (ref 12–20)
CALCIUM SERPL-MCNC: 8.7 MG/DL (ref 8.5–10.1)
CHLORIDE SERPL-SCNC: 103 MMOL/L (ref 100–108)
CO2 SERPL-SCNC: 29 MMOL/L (ref 21–32)
CREAT SERPL-MCNC: 1.05 MG/DL (ref 0.6–1.3)
DIFFERENTIAL METHOD BLD: ABNORMAL
EOSINOPHIL # BLD: 0.1 K/UL (ref 0–0.4)
EOSINOPHIL NFR BLD: 3 % (ref 0–5)
ERYTHROCYTE [DISTWIDTH] IN BLOOD BY AUTOMATED COUNT: 13.7 % (ref 11.6–14.5)
GLUCOSE SERPL-MCNC: 191 MG/DL (ref 74–99)
HCT VFR BLD AUTO: 28.6 % (ref 36–48)
HGB BLD-MCNC: 9.9 G/DL (ref 13–16)
INR PPP: 0.9 (ref 0.8–1.2)
LYMPHOCYTES # BLD: 1.5 K/UL (ref 0.9–3.6)
LYMPHOCYTES NFR BLD: 35 % (ref 21–52)
MCH RBC QN AUTO: 30.7 PG (ref 24–34)
MCHC RBC AUTO-ENTMCNC: 34.6 G/DL (ref 31–37)
MCV RBC AUTO: 88.8 FL (ref 74–97)
MONOCYTES # BLD: 0.3 K/UL (ref 0.05–1.2)
MONOCYTES NFR BLD: 7 % (ref 3–10)
NEUTS SEG # BLD: 2.3 K/UL (ref 1.8–8)
NEUTS SEG NFR BLD: 54 % (ref 40–73)
PLATELET # BLD AUTO: 236 K/UL (ref 135–420)
PMV BLD AUTO: 9.2 FL (ref 9.2–11.8)
POTASSIUM SERPL-SCNC: 4.3 MMOL/L (ref 3.5–5.5)
PROTHROMBIN TIME: 11.5 SEC (ref 11.5–15.2)
RBC # BLD AUTO: 3.22 M/UL (ref 4.7–5.5)
SODIUM SERPL-SCNC: 136 MMOL/L (ref 136–145)
WBC # BLD AUTO: 4.2 K/UL (ref 4.6–13.2)

## 2018-11-05 PROCEDURE — 85610 PROTHROMBIN TIME: CPT | Performed by: NURSE PRACTITIONER

## 2018-11-05 PROCEDURE — 74011250636 HC RX REV CODE- 250/636: Performed by: NURSE PRACTITIONER

## 2018-11-05 PROCEDURE — 88185 FLOWCYTOMETRY/TC ADD-ON: CPT

## 2018-11-05 PROCEDURE — 77002 NEEDLE LOCALIZATION BY XRAY: CPT

## 2018-11-05 PROCEDURE — 85730 THROMBOPLASTIN TIME PARTIAL: CPT | Performed by: NURSE PRACTITIONER

## 2018-11-05 PROCEDURE — 77030028872 HC BN BIOP NDL ON CNTRL TY TELE -C

## 2018-11-05 PROCEDURE — 88305 TISSUE EXAM BY PATHOLOGIST: CPT

## 2018-11-05 PROCEDURE — 80048 BASIC METABOLIC PNL TOTAL CA: CPT | Performed by: NURSE PRACTITIONER

## 2018-11-05 PROCEDURE — 88313 SPECIAL STAINS GROUP 2: CPT | Performed by: INTERNAL MEDICINE

## 2018-11-05 PROCEDURE — 74011250636 HC RX REV CODE- 250/636: Performed by: RADIOLOGY

## 2018-11-05 PROCEDURE — 88311 DECALCIFY TISSUE: CPT | Performed by: INTERNAL MEDICINE

## 2018-11-05 PROCEDURE — 77030003666 HC NDL SPINAL BD -A

## 2018-11-05 PROCEDURE — 77030022017 HC DRSG HEMO QCLOT ZMED -A

## 2018-11-05 PROCEDURE — 88237 TISSUE CULTURE BONE MARROW: CPT

## 2018-11-05 PROCEDURE — 88264 CHROMOSOME ANALYSIS 20-25: CPT

## 2018-11-05 PROCEDURE — 88184 FLOWCYTOMETRY/ TC 1 MARKER: CPT

## 2018-11-05 PROCEDURE — 85025 COMPLETE CBC W/AUTO DIFF WBC: CPT | Performed by: NURSE PRACTITIONER

## 2018-11-05 RX ORDER — LIDOCAINE HYDROCHLORIDE 10 MG/ML
30 INJECTION, SOLUTION EPIDURAL; INFILTRATION; INTRACAUDAL; PERINEURAL ONCE
Status: COMPLETED | OUTPATIENT
Start: 2018-11-05 | End: 2018-11-05

## 2018-11-05 RX ORDER — NALOXONE HYDROCHLORIDE 0.4 MG/ML
0.1 INJECTION, SOLUTION INTRAMUSCULAR; INTRAVENOUS; SUBCUTANEOUS
Status: DISCONTINUED | OUTPATIENT
Start: 2018-11-05 | End: 2018-11-05 | Stop reason: HOSPADM

## 2018-11-05 RX ORDER — FENTANYL CITRATE 50 UG/ML
50 INJECTION, SOLUTION INTRAMUSCULAR; INTRAVENOUS
Status: DISCONTINUED | OUTPATIENT
Start: 2018-11-05 | End: 2018-11-05

## 2018-11-05 RX ORDER — FLUMAZENIL 0.1 MG/ML
0.2 INJECTION INTRAVENOUS
Status: DISCONTINUED | OUTPATIENT
Start: 2018-11-05 | End: 2018-11-05 | Stop reason: HOSPADM

## 2018-11-05 RX ORDER — SODIUM CHLORIDE 0.9 % (FLUSH) 0.9 %
5-10 SYRINGE (ML) INJECTION EVERY 8 HOURS
Status: DISCONTINUED | OUTPATIENT
Start: 2018-11-05 | End: 2018-11-05 | Stop reason: HOSPADM

## 2018-11-05 RX ORDER — MIDAZOLAM HYDROCHLORIDE 1 MG/ML
1 INJECTION, SOLUTION INTRAMUSCULAR; INTRAVENOUS
Status: DISCONTINUED | OUTPATIENT
Start: 2018-11-05 | End: 2018-11-05

## 2018-11-05 RX ORDER — SODIUM CHLORIDE 0.9 % (FLUSH) 0.9 %
5-10 SYRINGE (ML) INJECTION AS NEEDED
Status: DISCONTINUED | OUTPATIENT
Start: 2018-11-05 | End: 2018-11-05 | Stop reason: HOSPADM

## 2018-11-05 RX ORDER — SODIUM CHLORIDE 9 MG/ML
20 INJECTION, SOLUTION INTRAVENOUS CONTINUOUS
Status: DISCONTINUED | OUTPATIENT
Start: 2018-11-05 | End: 2018-11-05 | Stop reason: HOSPADM

## 2018-11-05 RX ADMIN — MIDAZOLAM HYDROCHLORIDE 1 MG: 2 INJECTION, SOLUTION INTRAMUSCULAR; INTRAVENOUS at 10:30

## 2018-11-05 RX ADMIN — SODIUM CHLORIDE 20 ML/HR: 900 INJECTION, SOLUTION INTRAVENOUS at 10:14

## 2018-11-05 RX ADMIN — LIDOCAINE HYDROCHLORIDE 30 ML: 10 INJECTION, SOLUTION EPIDURAL; INFILTRATION; INTRACAUDAL; PERINEURAL at 10:29

## 2018-11-05 RX ADMIN — MIDAZOLAM HYDROCHLORIDE 1 MG: 2 INJECTION, SOLUTION INTRAMUSCULAR; INTRAVENOUS at 10:25

## 2018-11-05 RX ADMIN — FENTANYL CITRATE 50 MCG: 50 INJECTION INTRAMUSCULAR; INTRAVENOUS at 10:25

## 2018-11-05 RX ADMIN — FENTANYL CITRATE 50 MCG: 50 INJECTION INTRAMUSCULAR; INTRAVENOUS at 10:30

## 2018-11-05 NOTE — PROGRESS NOTES
TRANSFER - OUT REPORT:    Verbal report given to RAND Figueroa(name) on Julio Hickey  being transferred to [facility](unit) for routine post - op       Report consisted of patients Situation, Background, Assessment and   Recommendations(SBAR). Information from the following report(s) SBAR, Kardex and MAR was reviewed with the receiving nurse. Lines:   Peripheral IV 11/05/18 Left Antecubital (Active)   Site Assessment Clean, dry, & intact 11/5/2018 10:30 AM   Phlebitis Assessment 0 11/5/2018 10:30 AM   Infiltration Assessment 0 11/5/2018 10:30 AM   Dressing Status Clean, dry, & intact 11/5/2018 10:30 AM   Dressing Type Transparent 11/5/2018 10:30 AM   Hub Color/Line Status Pink 11/5/2018 10:30 AM        Opportunity for questions and clarification was provided.       Patient transported with:   [employer]

## 2018-11-05 NOTE — H&P
OUTPATIENT HISTORY AND PHYSICAL      Today 11/5/2018     Indication/Symptoms:   Neelam Burks is a 61 y.o. male with a history of chronic anemia who presents for an image-guided bone marrow biopsy with moderate sedation as IR schedule allows. Patient has been NPO since midnight and takes no blood thinning medications. Current Meds:    Prior to Admission medications    Medication Sig Start Date End Date Taking? Authorizing Provider   traMADol (ULTRAM) 50 mg tablet Take 1 Tab by mouth every six (6) hours as needed for Pain. Max Daily Amount: 200 mg. 1/10/17   Saintclair Saras M, PA   cyclobenzaprine (FLEXERIL) 10 mg tablet Take 1 Tab by mouth three (3) times daily as needed for Muscle Spasm(s). 11/29/16   Saintclair Saras M, PA   metFORMIN (GLUCOPHAGE) 1,000 mg tablet Take 1,000 mg by mouth two (2) times daily (with meals). Provider, Historical   promethazine (PHENERGAN) 25 mg tablet Take 1 Tab by mouth every six (6) hours as needed for Nausea. Indications: PREVENTION OF POST-OPERATIVE NAUSEA AND VOMITING 5/31/16   David Craven PA-C   polyethylene glycol (MIRALAX) 17 gram packet Take 1 Packet by mouth daily. 5/31/16   Debbie Craven PA-C   indomethacin (INDOCIN) 25 mg capsule Take  by mouth three (3) times daily. Provider, Historical   diclofenac EC (VOLTAREN) 75 mg EC tablet Take 1 Tab by mouth two (2) times daily (with meals). 4/12/16   Lei Anders MD   gabapentin (NEURONTIN) 300 mg capsule 1 in the am and 2 in the pm - taper up as directed 1/14/16   Lei Anders MD   simvastatin (ZOCOR) 10 mg tablet Take  by mouth nightly. Provider, Historical   glimepiride (AMARYL) 2 mg tablet Take  by mouth every morning. Provider, Historical   PRISTIQ 50 mg tablet Take 50 mg by mouth as needed. 6/27/11   Provider, Historical   donepezil (ARICEPT) 10 mg tablet Take 10 mg by mouth nightly. 9/19/11   Provider, Historical   LUNESTA 3 mg tablet Take 3 mg by mouth nightly.  9/18/11 Provider, Historical   glipiZIDE (GLUCOTROL) 10 mg tablet Take 10 mg by mouth two (2) times a day. 6/25/11   Provider, Historical   lisinopril (PRINIVIL, ZESTRIL) 20 mg tablet Take 20 mg by mouth daily. 9/18/11   Provider, Historical       Allergies:    No Known Allergies    Comorbid Conditions:    Past Medical History:   Diagnosis Date    Chronic kidney disease     Diabetes mellitus     Essential hypertension     Kidney disease     Prostate cancer ([facility] [address]           Past Surgical History:   Procedure Laterality Date    HX CERVICAL FUSION  2000    HX OTHER SURGICAL  2015    Prostate Sx r/t cancer (seed implant)    HX OTHER SURGICAL      shoulder surgery     Data:    Visit Vitals  /81 (BP 1 Location: Right arm, BP Patient Position: At rest)   Pulse 65   Resp 16   Ht 5' 9\" (1.753 m)   Wt 90.7 kg (200 lb)   SpO2 100%   BMI 29.53 kg/m²   :  Recent Labs     11/05/18  0935        Recent Labs     11/05/18  0935   INR 0.9   APTT 21.1*       The H & P and/or progress notes and any available imaging were reviewed. The risks, indications and possible alternatives to the procedure, including doing nothing, were discussed and informed consent was obtained. Physical Exam:      Mental status:   Alert and oriented. Examination specific to the procedure proposed to be performed and any co morbid conditions:   Mallampati classification 3 ,  ASA 2   Heart:   Regular rate. Lungs:   Normal respiratory effort. Symmetrical rise and fall of chest    The patient is an appropriate candidate to undergo the planned procedure and sedation.     Durga Nowak

## 2018-11-05 NOTE — DISCHARGE INSTRUCTIONS
Bone Marrow Aspiration and Biopsy: What to Expect at Home  Your Recovery  The biopsy site may feel sore for several days. It can help to walk, take pain medicine, and put ice packs on the site. You will probably be able to return to work and your usual activities the day after the procedure. Your doctor or nurse will call you with the results of your test.  This care sheet gives you a general idea about how long it will take for you to recover. But each person recovers at a different pace. Follow the steps below to get better as quickly as possible. How can you care for yourself at home? Activity    · Rest when you feel tired. Getting enough sleep will help you recover.     · You may drive when you are no longer taking pain pills and can quickly move your foot from the gas pedal to the brake. You must also be able to sit comfortably for a long period of time, even if you do not plan to go far. You might get caught in traffic.     · Most people are able to return to work the day after the procedure. Medicines    · Your doctor will tell you if and when you can restart your medicines. He or she will also give you instructions about taking any new medicines.     · If you take blood thinners, such as warfarin (Coumadin), clopidogrel (Plavix), or aspirin, be sure to talk to your doctor. He or she will tell you if and when to start taking those medicines again. Make sure that you understand exactly what your doctor wants you to do.     · Be safe with medicines. Take pain medicines exactly as directed. ? If the doctor gave you a prescription medicine for pain, take it as prescribed. ? If you are not taking a prescription pain medicine, take an over-the-counter medicine such as acetaminophen (Tylenol), ibuprofen (Advil, Motrin), or naproxen (Aleve). Read and follow all instructions on the label. ? Do not take two or more pain medicines at the same time unless the doctor told you to.  Many pain medicines have acetaminophen, which is Tylenol. Too much acetaminophen (Tylenol) can be harmful.     · If you think your pain medicine is making you sick to your stomach:  ? Take your medicine after meals (unless your doctor has told you not to). ? Ask your doctor for a different pain medicine.     · If your doctor prescribed antibiotics, take them as directed. Do not stop taking them just because you feel better.    Ice    · Put ice or a cold pack on the biopsy site for 10 to 20 minutes at a time. Put a thin cloth between the ice and your skin. Follow-up care is a key part of your treatment and safety. Be sure to make and go to all appointments, and call your doctor if you are having problems. It's also a good idea to know your test results and keep a list of the medicines you take. When should you call for help? Call 911 anytime you think you may need emergency care. For example, call if:    · You passed out (lost consciousness).    Call your doctor now or seek immediate medical care if:    · You have signs of infection, such as:  ? Increased pain, swelling, warmth, or redness. ? Red streaks leading from the biopsy site. ? Pus draining from the biopsy site. ? Swollen lymph nodes in your neck, armpits, or groin. ? A fever.    Watch closely for any changes in your health, and be sure to contact your doctor if:    · You are not getting better as expected. DISCHARGE SUMMARY from Nurse    PATIENT INSTRUCTIONS:    After general anesthesia or intravenous sedation, for 24 hours or while taking prescription Narcotics:  · Limit your activities  · Do not drive and operate hazardous machinery  · Do not make important personal or business decisions  · Do  not drink alcoholic beverages  · If you have not urinated within 8 hours after discharge, please contact your surgeon on call.     Report the following to your surgeon:  · Excessive pain, swelling, redness or odor of or around the surgical area  · Temperature over 100.5  · Nausea and vomiting lasting longer than 4 hours or if unable to take medications  · Any signs of decreased circulation or nerve impairment to extremity: change in color, persistent  numbness, tingling, coldness or increase pain  · Any questions    What to do at Home:  Recommended activity:    *  Please give a list of your current medications to your Primary Care Provider. *  Please update this list whenever your medications are discontinued, doses are      changed, or new medications (including over-the-counter products) are added. *  Please carry medication information at all times in case of emergency situations. These are general instructions for a healthy lifestyle:    No smoking/ No tobacco products/ Avoid exposure to second hand smoke  Surgeon General's Warning:  Quitting smoking now greatly reduces serious risk to your health. Obesity, smoking, and sedentary lifestyle greatly increases your risk for illness    A healthy diet, regular physical exercise & weight monitoring are important for maintaining a healthy lifestyle    You may be retaining fluid if you have a history of heart failure or if you experience any of the following symptoms:  Weight gain of 3 pounds or more overnight or 5 pounds in a week, increased swelling in our hands or feet or shortness of breath while lying flat in bed. Please call your doctor as soon as you notice any of these symptoms; do not wait until your next office visit. Recognize signs and symptoms of STROKE:    F-face looks uneven    A-arms unable to move or move unevenly    S-speech slurred or non-existent    T-time-call 911 as soon as signs and symptoms begin-DO NOT go       Back to bed or wait to see if you get better-TIME IS BRAIN. Warning Signs of HEART ATTACK     Call 911 if you have these symptoms:   Chest discomfort.  Most heart attacks involve discomfort in the center of the chest that lasts more than a few minutes, or that goes away and comes back. It can feel like uncomfortable pressure, squeezing, fullness, or pain.  Discomfort in other areas of the upper body. Symptoms can include pain or discomfort in one or both arms, the back, neck, jaw, or stomach.  Shortness of breath with or without chest discomfort.  Other signs may include breaking out in a cold sweat, nausea, or lightheadedness. Don't wait more than five minutes to call 911 - MINUTES MATTER! Fast action can save your life. Calling 911 is almost always the fastest way to get lifesaving treatment. Emergency Medical Services staff can begin treatment when they arrive -- up to an hour sooner than if someone gets to the hospital by car. The discharge information has been reviewed with the patient and caregiver. The patient and caregiver verbalized understanding. Discharge medications reviewed with the patient and caregiver and appropriate educational materials and side effects teaching were provided. ___________________________________________________________________________________________________________________________________  Where can you learn more? Go to http://cleo-monika.info/. Enter E148 in the search box to learn more about \"Bone Marrow Aspiration and Biopsy: What to Expect at Home. \"  Current as of: September 10, 2017  Content Version: 11.8  © 9925-0436 [employer]. Care instructions adapted under license by [employer] (which disclaims liability or warranty for this information). If you have questions about a medical condition or this instruction, always ask your healthcare professional. [address] any warranty or liability for your use of this information.

## 2018-11-05 NOTE — PROGRESS NOTES
Patient has a pending schedule with Dr Jared Kitchen on 11/16 to review results of his bone marrow bx.

## 2018-11-16 ENCOUNTER — OFFICE VISIT (OUTPATIENT)
Dept: ONCOLOGY | Age: 63
End: 2018-11-16

## 2018-11-16 VITALS
DIASTOLIC BLOOD PRESSURE: 77 MMHG | WEIGHT: 200 LBS | OXYGEN SATURATION: 99 % | RESPIRATION RATE: 18 BRPM | SYSTOLIC BLOOD PRESSURE: 137 MMHG | HEART RATE: 76 BPM | TEMPERATURE: 98.2 F | HEIGHT: 69 IN | BODY MASS INDEX: 29.62 KG/M2

## 2018-11-16 DIAGNOSIS — D46.9 MYELODYSPLASTIC SYNDROME (HCC): ICD-10-CM

## 2018-11-16 DIAGNOSIS — D64.9 CHRONIC ANEMIA: Primary | ICD-10-CM

## 2018-11-16 DIAGNOSIS — D50.8 IRON DEFICIENCY ANEMIA SECONDARY TO INADEQUATE DIETARY IRON INTAKE: ICD-10-CM

## 2018-11-16 NOTE — PROGRESS NOTES
Hematology/medical oncology progress note 11/16/2018 J Luis Frye YOB: 1955 PCP: Marilyn Delaney MD 
 
Diagnosis: Early myelodysplastic syndrome with associated anemia Mr. Tameka Malone is a 24-year-old man who was referred for an evaluation of anemia. I have explained to the patient that his CBC on 11/5/2018 showed that his hemoglobin was 9.9 g/dL with hematocrit of 28.6%. Repleted his bone marrow biopsy on 11/5/2018 and this showed a normocytic anemia with a mildly hypocellular marrow but with progressive trilineage hematopoiesis. There was no evidence of acute leukemia, lymphoma, or metastatic neoplasm. The subsequent cytogenetic analysis revealed that he had a normal male karyotype. The flow cytometry from the bone marrow showed no diagnostic immunophenotypic abnormalities. Therefore I have explained to the patient that his ferritin level from 10/22/2018 was normal at 168 ng/mL with a normal transferrin saturation of 16%. The lab findings are consistent with the clinical diagnosis of early myelodysplastic syndrome. He has normal kidney and liver function. Therefore I am recommending that the patient began treatment with Procrit at a dose of 60,000 units subcutaneous every 2 weeks. I reviewed the risk, benefits, and side effects of the medication with the patient. I will plan to see him back in clinic in about 8 weeks for reassessment. The patient had his questions answered to his satisfaction. Total time 30 minutes, greater than 50% of the time was in counseling and coordination of care. Velasquez Arteaga MD, [address]

## 2018-11-16 NOTE — PATIENT INSTRUCTIONS
Iron Deficiency Anemia: Care Instructions Your Care Instructions Anemia means that you do not have enough red blood cells. Red blood cells carry oxygen around your body. When you have anemia, it can make you pale, weak, and tired. Many things can cause anemia. The most common cause is loss of blood. This can happen if you have heavy menstrual periods. It can also happen if you have bleeding in your stomach or bowel. You can also get anemia if you don't have enough iron in your diet or if it's hard for your body to absorb iron. In some cases, pregnancy causes anemia. That's because a pregnant woman needs more iron. Your doctor may do more tests to find the cause of your anemia. If a disease or other health problem is causing it, your doctor will treat that problem. It's important to follow up with your doctor to make sure that your iron level returns to normal. 
Follow-up care is a key part of your treatment and safety. Be sure to make and go to all appointments, and call your doctor if you are having problems. It's also a good idea to know your test results and keep a list of the medicines you take. How can you care for yourself at home? · If your doctor recommended iron pills, take them as directed. ? Try to take the pills on an empty stomach. You can do this about 1 hour before or 2 hours after meals. But you may need to take iron with food to avoid an upset stomach. ? Do not take antacids or drink milk or anything with caffeine within 2 hours of when you take your iron. They can keep your body from absorbing the iron well. ? Vitamin C helps your body absorb iron. You may want to take iron pills with a glass of orange juice or some other food high in vitamin C. 
? Iron pills may cause stomach problems. These include heartburn, nausea, diarrhea, constipation, and cramps. It can help to drink plenty of fluids and include fruits, vegetables, and fiber in your diet. ? It's normal for iron pills to make your stool a greenish or grayish black. But internal bleeding can also cause dark stool. So it's important to tell your doctor about any color changes. ? Call your doctor if you think you are having a problem with your iron pills. Even after you start to feel better, it will take several months for your body to build up its supply of iron. ? If you miss a pill, don't take a double dose. ? Keep iron pills out of the reach of small children. Too much iron can be very dangerous. · Eat foods with a lot of iron. These include red meat, shellfish, poultry, and eggs. They also include beans, raisins, whole-grain bread, and leafy green vegetables. · Steam your vegetables. This is the best way to prepare them if you want to get as much iron as possible. · Be safe with medicines. Do not take nonsteroidal anti-inflammatory pain relievers unless your doctor tells you to. These include aspirin, naproxen (Aleve), and ibuprofen (Advil, Motrin). · Liquid iron can stain your teeth. But you can mix it with water or juice and drink it with a straw. Then it won't get on your teeth. When should you call for help? Call 911 anytime you think you may need emergency care. For example, call if: 
  · You passed out (lost consciousness).  
 Call your doctor now or seek immediate medical care if: 
  · You are short of breath.  
  · You are dizzy or light-headed, or you feel like you may faint.  
  · You have new or worse bleeding.  
 Watch closely for changes in your health, and be sure to contact your doctor if: 
  · You feel weaker or more tired than usual.  
  · You do not get better as expected. Where can you learn more? Go to http://cleo-monika.info/. Enter R971 in the search box to learn more about \"Iron Deficiency Anemia: Care Instructions. \" Current as of: May 7, 2018 Content Version: 11.8 © 2191-4224 Healthwise, Incorporated.  Care instructions adapted under license by [address] (which disclaims liability or warranty for this information). If you have questions about a medical condition or this instruction, always ask your healthcare professional. [address] any warranty or liability for your use of this information.

## 2018-12-04 ENCOUNTER — HOSPITAL ENCOUNTER (OUTPATIENT)
Dept: ONCOLOGY | Age: 63
Discharge: HOME | End: 2018-12-04

## 2018-12-04 ENCOUNTER — HOSPITAL ENCOUNTER (OUTPATIENT)
Dept: INFUSION THERAPY | Age: 63
Discharge: HOME | End: 2018-12-04
Payer: MEDICARE

## 2018-12-04 ENCOUNTER — CLINICAL SUPPORT (OUTPATIENT)
Dept: ONCOLOGY | Age: 63
End: 2018-12-04

## 2018-12-04 VITALS
OXYGEN SATURATION: 97 % | SYSTOLIC BLOOD PRESSURE: 147 MMHG | HEIGHT: 69 IN | DIASTOLIC BLOOD PRESSURE: 72 MMHG | BODY MASS INDEX: 28.88 KG/M2 | HEART RATE: 85 BPM | RESPIRATION RATE: 20 BRPM | WEIGHT: 195 LBS | TEMPERATURE: 97 F

## 2018-12-04 DIAGNOSIS — D50.9 IRON DEFICIENCY ANEMIA, UNSPECIFIED IRON DEFICIENCY ANEMIA TYPE: ICD-10-CM

## 2018-12-04 DIAGNOSIS — D50.9 IRON DEFICIENCY ANEMIA, UNSPECIFIED IRON DEFICIENCY ANEMIA TYPE: Primary | ICD-10-CM

## 2018-12-04 LAB
BASO+EOS+MONOS # BLD AUTO: 0.3 K/UL (ref 0–2.3)
BASO+EOS+MONOS # BLD AUTO: 7 % (ref 0.1–17)
DIFFERENTIAL METHOD BLD: ABNORMAL
ERYTHROCYTE [DISTWIDTH] IN BLOOD BY AUTOMATED COUNT: 12.4 % (ref 11.5–14.5)
HCT VFR BLD AUTO: 30.2 % (ref 36–48)
HGB BLD-MCNC: 10.4 G/DL (ref 12–16)
LYMPHOCYTES # BLD: 1.4 K/UL (ref 1.1–5.9)
LYMPHOCYTES NFR BLD: 33 % (ref 14–44)
MCH RBC QN AUTO: 29.9 PG (ref 25–35)
MCHC RBC AUTO-ENTMCNC: 34.4 G/DL (ref 31–37)
MCV RBC AUTO: 86.8 FL (ref 78–102)
NEUTS SEG # BLD: 2.5 K/UL (ref 1.8–9.5)
NEUTS SEG NFR BLD: 60 % (ref 40–70)
PLATELET # BLD AUTO: 243 K/UL (ref 140–440)
RBC # BLD AUTO: 3.48 M/UL (ref 4.1–5.1)
WBC # BLD AUTO: 4.2 K/UL (ref 4.5–13)

## 2018-12-04 PROCEDURE — 36415 COLL VENOUS BLD VENIPUNCTURE: CPT

## 2018-12-04 RX ORDER — LORAZEPAM 1 MG/1
1 TABLET ORAL
COMMUNITY

## 2018-12-04 RX ORDER — ARIPIPRAZOLE 10 MG/1
10 TABLET ORAL DAILY
COMMUNITY

## 2018-12-04 RX ORDER — BISMUTH SUBSALICYLATE 262 MG
1 TABLET,CHEWABLE ORAL DAILY
COMMUNITY

## 2018-12-04 RX ORDER — GUAIFENESIN 100 MG/5ML
81 LIQUID (ML) ORAL DAILY
COMMUNITY

## 2018-12-04 RX ORDER — ROSUVASTATIN CALCIUM 40 MG/1
40 TABLET, COATED ORAL
COMMUNITY

## 2018-12-04 RX ORDER — INSULIN ASPART 100 [IU]/ML
12 INJECTION, SUSPENSION SUBCUTANEOUS
COMMUNITY

## 2018-12-04 RX ORDER — VENLAFAXINE 75 MG/1
75 TABLET ORAL DAILY
COMMUNITY

## 2018-12-04 RX ORDER — INSULIN GLARGINE 100 [IU]/ML
50 INJECTION, SOLUTION SUBCUTANEOUS EVERY 12 HOURS
COMMUNITY
End: 2019-02-15

## 2018-12-04 RX ORDER — ZOLPIDEM TARTRATE 5 MG/1
TABLET ORAL
COMMUNITY

## 2018-12-04 RX ORDER — ACETAMINOPHEN 500 MG
500 TABLET ORAL
COMMUNITY

## 2018-12-04 NOTE — PROGRESS NOTES
[facility] OPI Progress Note Date: 2018 Name: Yisel Davis MRN: [id number] : 1955 Mr. Pierre arrived in the [facility] today at 1310, in stable condition, here for Q 2 Week, CBC/Procrit injection. He was assessed and education was provided. Mr. Bety Lin vitals were reviewed. Visit Vitals /72 (BP 1 Location: Left arm, BP Patient Position: Sitting) Pulse 85 Temp 97 °F (36.1 °C) Resp 20 Ht 5' 9\" (1.753 m) Wt 88.5 kg (195 lb) SpO2 97% BMI 28.80 kg/m² Blood was drawn for a CBC, from his right AC at 1320, without incident. Lab results were obtained and reviewed. Recent Results (from the past 12 hour(s)) CBC WITH 3 PART DIFF Collection Time: 18  1:20 PM  
Result Value Ref Range WBC 4.2 (L) 4.5 - 13.0 K/uL  
 RBC 3.48 (L) 4.10 - 5.10 M/uL  
 HGB 10.4 (L) 12.0 - 16.0 g/dL HCT 30.2 (L) 36 - 48 % MCV 86.8 78 - 102 FL  
 MCH 29.9 25.0 - 35.0 PG  
 MCHC 34.4 31 - 37 g/dL  
 RDW 12.4 11.5 - 14.5 % PLATELET 540 815 - 547 K/uL NEUTROPHILS 60 40 - 70 % MIXED CELLS 7 0.1 - 17 % LYMPHOCYTES 33 14 - 44 % ABS. NEUTROPHILS 2.5 1.8 - 9.5 K/UL  
 ABS. MIXED CELLS 0.3 0.0 - 2.3 K/uL  
 ABS. LYMPHOCYTES 1.4 1.1 - 5.9 K/UL  
 DF AUTOMATED Procrit injection was HELD today, per order. Mr. Gemma Baez tolerated well, and had no complaints. Mr. Gemma Baez was discharged from [place] [address] in stable condition at 1400. Augutsine Myers He is to return in 2 weeks, on Tuesday, 18,  at 1000,  for his next appointment, for CBC/Procrit injection. Justin Scherer RN 2018 
1:38 PM

## 2018-12-18 ENCOUNTER — HOSPITAL ENCOUNTER (OUTPATIENT)
Dept: INFUSION THERAPY | Age: 63
End: 2018-12-18
Payer: MEDICARE

## 2018-12-21 ENCOUNTER — CLINICAL SUPPORT (OUTPATIENT)
Dept: ONCOLOGY | Age: 63
End: 2018-12-21

## 2018-12-21 ENCOUNTER — HOSPITAL ENCOUNTER (OUTPATIENT)
Dept: INFUSION THERAPY | Age: 63
Discharge: HOME | End: 2018-12-21
Payer: MEDICARE

## 2018-12-21 ENCOUNTER — HOSPITAL ENCOUNTER (OUTPATIENT)
Dept: ONCOLOGY | Age: 63
Discharge: HOME | End: 2018-12-21

## 2018-12-21 VITALS
DIASTOLIC BLOOD PRESSURE: 73 MMHG | RESPIRATION RATE: 16 BRPM | TEMPERATURE: 97.1 F | SYSTOLIC BLOOD PRESSURE: 125 MMHG | HEART RATE: 78 BPM

## 2018-12-21 DIAGNOSIS — D50.9 IRON DEFICIENCY ANEMIA, UNSPECIFIED IRON DEFICIENCY ANEMIA TYPE: Primary | ICD-10-CM

## 2018-12-21 DIAGNOSIS — D50.9 IRON DEFICIENCY ANEMIA, UNSPECIFIED IRON DEFICIENCY ANEMIA TYPE: ICD-10-CM

## 2018-12-21 LAB
BASO+EOS+MONOS # BLD AUTO: 0.3 K/UL (ref 0–2.3)
BASO+EOS+MONOS # BLD AUTO: 7 % (ref 0.1–17)
DIFFERENTIAL METHOD BLD: ABNORMAL
ERYTHROCYTE [DISTWIDTH] IN BLOOD BY AUTOMATED COUNT: 12.4 % (ref 11.5–14.5)
HCT VFR BLD AUTO: 31 % (ref 36–48)
HGB BLD-MCNC: 10.8 G/DL (ref 12–16)
LYMPHOCYTES # BLD: 1.7 K/UL (ref 1.1–5.9)
LYMPHOCYTES NFR BLD: 36 % (ref 14–44)
MCH RBC QN AUTO: 29.7 PG (ref 25–35)
MCHC RBC AUTO-ENTMCNC: 34.8 G/DL (ref 31–37)
MCV RBC AUTO: 85.2 FL (ref 78–102)
NEUTS SEG # BLD: 2.7 K/UL (ref 1.8–9.5)
NEUTS SEG NFR BLD: 58 % (ref 40–70)
PLATELET # BLD AUTO: 253 K/UL (ref 140–440)
RBC # BLD AUTO: 3.64 M/UL (ref 4.1–5.1)
WBC # BLD AUTO: 4.7 K/UL (ref 4.5–13)

## 2018-12-21 PROCEDURE — 36415 COLL VENOUS BLD VENIPUNCTURE: CPT

## 2018-12-21 NOTE — PROGRESS NOTES
[facility] Progress Note    Date: 2018    Name: Shyanne Oneill    MRN: [id number]         : 1955      Mr. Aidan Renee was assessed and education was provided. Mr. Jason Voss vitals were reviewed and patient was observed for 5 minutes prior to treatment. Visit Vitals  /73 (BP 1 Location: Left arm, BP Patient Position: Sitting)   Pulse 78   Temp 97.1 °F (36.2 °C)   Resp 16       Lab results were obtained and reviewed. Recent Results (from the past 12 hour(s))   CBC WITH 3 PART DIFF    Collection Time: 18  1:41 PM   Result Value Ref Range    WBC 4.7 4.5 - 13.0 K/uL    RBC 3.64 (L) 4.10 - 5.10 M/uL    HGB 10.8 (L) 12.0 - 16.0 g/dL    HCT 31.0 (L) 36 - 48 %    MCV 85.2 78 - 102 FL    MCH 29.7 25.0 - 35.0 PG    MCHC 34.8 31 - 37 g/dL    RDW 12.4 11.5 - 14.5 %    PLATELET 150 896 - 010 K/uL    NEUTROPHILS 58 40 - 70 %    MIXED CELLS 7 0.1 - 17 %    LYMPHOCYTES 36 14 - 44 %    ABS. NEUTROPHILS 2.7 1.8 - 9.5 K/UL    ABS. MIXED CELLS 0.3 0.0 - 2.3 K/uL    ABS. LYMPHOCYTES 1.7 1.1 - 5.9 K/UL    DF AUTOMATED         Procrit not given for H&H 10.8/31.0. Patient armband removed and shredded. Mr. Aidan Renee was discharged from [place] [address] in stable condition at 1350. He is to return on 19 at 0900 for his next appointment.     Jose D Scherer RN  2018  1:51 PM

## 2019-01-04 ENCOUNTER — HOSPITAL ENCOUNTER (OUTPATIENT)
Dept: INFUSION THERAPY | Age: 64
Discharge: HOME | End: 2019-01-04
Payer: MEDICARE

## 2019-01-04 ENCOUNTER — CLINICAL SUPPORT (OUTPATIENT)
Dept: ONCOLOGY | Age: 64
End: 2019-01-04

## 2019-01-04 ENCOUNTER — HOSPITAL ENCOUNTER (OUTPATIENT)
Dept: ONCOLOGY | Age: 64
Discharge: HOME | End: 2019-01-04

## 2019-01-04 ENCOUNTER — APPOINTMENT (OUTPATIENT)
Dept: INFUSION THERAPY | Age: 64
End: 2019-01-04
Payer: MEDICARE

## 2019-01-04 VITALS
HEART RATE: 78 BPM | DIASTOLIC BLOOD PRESSURE: 77 MMHG | RESPIRATION RATE: 16 BRPM | TEMPERATURE: 97.6 F | SYSTOLIC BLOOD PRESSURE: 144 MMHG

## 2019-01-04 DIAGNOSIS — D50.9 IRON DEFICIENCY ANEMIA, UNSPECIFIED IRON DEFICIENCY ANEMIA TYPE: Primary | ICD-10-CM

## 2019-01-04 DIAGNOSIS — D50.9 IRON DEFICIENCY ANEMIA, UNSPECIFIED IRON DEFICIENCY ANEMIA TYPE: ICD-10-CM

## 2019-01-04 LAB
BASO+EOS+MONOS # BLD AUTO: 0.3 K/UL (ref 0–2.3)
BASO+EOS+MONOS NFR BLD AUTO: 8 % (ref 0.1–17)
DIFFERENTIAL METHOD BLD: ABNORMAL
ERYTHROCYTE [DISTWIDTH] IN BLOOD BY AUTOMATED COUNT: 12.5 % (ref 11.5–14.5)
HCT VFR BLD AUTO: 31.6 % (ref 36–48)
HGB BLD-MCNC: 10.7 G/DL (ref 12–16)
LYMPHOCYTES # BLD: 1.5 K/UL (ref 1.1–5.9)
LYMPHOCYTES NFR BLD: 37 % (ref 14–44)
MCH RBC QN AUTO: 29.2 PG (ref 25–35)
MCHC RBC AUTO-ENTMCNC: 33.9 G/DL (ref 31–37)
MCV RBC AUTO: 86.3 FL (ref 78–102)
NEUTS SEG # BLD: 2.3 K/UL (ref 1.8–9.5)
NEUTS SEG NFR BLD: 55 % (ref 40–70)
PLATELET # BLD AUTO: 230 K/UL (ref 140–440)
RBC # BLD AUTO: 3.66 M/UL (ref 4.1–5.1)
WBC # BLD AUTO: 4.1 K/UL (ref 4.5–13)

## 2019-01-04 PROCEDURE — 36415 COLL VENOUS BLD VENIPUNCTURE: CPT

## 2019-01-04 NOTE — PROGRESS NOTES
[facility] Progress Note Date: 2019 Name: Mike Aleman MRN: [id number] : 1955 Mr. Gabino Avalos was assessed and education was provided. Mr. Lo Pittman vitals were reviewed and patient was observed for 5 minutes prior to treatment. Visit Vitals /77 (BP 1 Location: Right arm, BP Patient Position: At rest;Sitting) Pulse 78 Temp 97.6 °F (36.4 °C) Resp 16 Lab results were obtained and reviewed. Recent Results (from the past 12 hour(s)) CBC WITH 3 PART DIFF Collection Time: 19  9:45 AM  
Result Value Ref Range WBC 4.1 (L) 4.5 - 13.0 K/uL  
 RBC 3.66 (L) 4.10 - 5.10 M/uL  
 HGB 10.7 (L) 12.0 - 16.0 g/dL HCT 31.6 (L) 36 - 48 % MCV 86.3 78 - 102 FL  
 MCH 29.2 25.0 - 35.0 PG  
 MCHC 33.9 31 - 37 g/dL  
 RDW 12.5 11.5 - 14.5 % PLATELET 971 045 - 772 K/uL NEUTROPHILS 55 40 - 70 % MIXED CELLS 8 0.1 - 17 % LYMPHOCYTES 37 14 - 44 % ABS. NEUTROPHILS 2.3 1.8 - 9.5 K/UL  
 ABS. MIXED CELLS 0.3 0.0 - 2.3 K/uL  
 ABS. LYMPHOCYTES 1.5 1.1 - 5.9 K/UL  
 DF AUTOMATED Procrit not given for H&H 10.7/31.6. Mr. Gabino Avalos tolerated the infusion, and had no complaints. Patient armband removed and shredded. Mr. Gabino Avalos was discharged from [place] [address] in stable condition at 1000. He is to return on 19 at 1300 for his next appointment for CBC/Procrit. Sofia Dover RN 
2019

## 2019-01-09 ENCOUNTER — APPOINTMENT (OUTPATIENT)
Dept: PHYSICAL THERAPY | Age: 64
End: 2019-01-09

## 2019-01-11 ENCOUNTER — OFFICE VISIT (OUTPATIENT)
Dept: ONCOLOGY | Age: 64
End: 2019-01-11

## 2019-01-11 ENCOUNTER — HOSPITAL ENCOUNTER (OUTPATIENT)
Dept: ONCOLOGY | Age: 64
Discharge: HOME | End: 2019-01-11

## 2019-01-11 VITALS
OXYGEN SATURATION: 100 % | HEIGHT: 69 IN | SYSTOLIC BLOOD PRESSURE: 113 MMHG | TEMPERATURE: 97.9 F | WEIGHT: 196 LBS | DIASTOLIC BLOOD PRESSURE: 91 MMHG | HEART RATE: 73 BPM | RESPIRATION RATE: 16 BRPM | BODY MASS INDEX: 29.03 KG/M2

## 2019-01-11 DIAGNOSIS — D46.9 MYELODYSPLASTIC SYNDROME (HCC): ICD-10-CM

## 2019-01-11 DIAGNOSIS — D50.8 IRON DEFICIENCY ANEMIA SECONDARY TO INADEQUATE DIETARY IRON INTAKE: ICD-10-CM

## 2019-01-11 DIAGNOSIS — D64.9 CHRONIC ANEMIA: Primary | ICD-10-CM

## 2019-01-11 DIAGNOSIS — D64.9 CHRONIC ANEMIA: ICD-10-CM

## 2019-01-11 LAB
BASO+EOS+MONOS # BLD AUTO: 0.4 K/UL (ref 0–2.3)
BASO+EOS+MONOS NFR BLD AUTO: 9 % (ref 0.1–17)
DIFFERENTIAL METHOD BLD: ABNORMAL
ERYTHROCYTE [DISTWIDTH] IN BLOOD BY AUTOMATED COUNT: 12.7 % (ref 11.5–14.5)
HCT VFR BLD AUTO: 33.3 % (ref 36–48)
HGB BLD-MCNC: 11.4 G/DL (ref 12–16)
LYMPHOCYTES # BLD: 1.5 K/UL (ref 1.1–5.9)
LYMPHOCYTES NFR BLD: 33 % (ref 14–44)
MCH RBC QN AUTO: 29.2 PG (ref 25–35)
MCHC RBC AUTO-ENTMCNC: 34.2 G/DL (ref 31–37)
MCV RBC AUTO: 85.4 FL (ref 78–102)
NEUTS SEG # BLD: 2.6 K/UL (ref 1.8–9.5)
NEUTS SEG NFR BLD: 58 % (ref 40–70)
PLATELET # BLD AUTO: 283 K/UL (ref 140–440)
RBC # BLD AUTO: 3.9 M/UL (ref 4.1–5.1)
WBC # BLD AUTO: 4.5 K/UL (ref 4.5–13)

## 2019-01-11 NOTE — PATIENT INSTRUCTIONS
Iron Deficiency Anemia: Care Instructions Your Care Instructions Anemia means that you do not have enough red blood cells. Red blood cells carry oxygen around your body. When you have anemia, it can make you pale, weak, and tired. Many things can cause anemia. The most common cause is loss of blood. This can happen if you have heavy menstrual periods. It can also happen if you have bleeding in your stomach or bowel. You can also get anemia if you don't have enough iron in your diet or if it's hard for your body to absorb iron. In some cases, pregnancy causes anemia. That's because a pregnant woman needs more iron. Your doctor may do more tests to find the cause of your anemia. If a disease or other health problem is causing it, your doctor will treat that problem. It's important to follow up with your doctor to make sure that your iron level returns to normal. 
Follow-up care is a key part of your treatment and safety. Be sure to make and go to all appointments, and call your doctor if you are having problems. It's also a good idea to know your test results and keep a list of the medicines you take. How can you care for yourself at home? · If your doctor recommended iron pills, take them as directed. ? Try to take the pills on an empty stomach. You can do this about 1 hour before or 2 hours after meals. But you may need to take iron with food to avoid an upset stomach. ? Do not take antacids or drink milk or anything with caffeine within 2 hours of when you take your iron. They can keep your body from absorbing the iron well. ? Vitamin C helps your body absorb iron. You may want to take iron pills with a glass of orange juice or some other food high in vitamin C. 
? Iron pills may cause stomach problems. These include heartburn, nausea, diarrhea, constipation, and cramps. It can help to drink plenty of fluids and include fruits, vegetables, and fiber in your diet. ? It's normal for iron pills to make your stool a greenish or grayish black. But internal bleeding can also cause dark stool. So it's important to tell your doctor about any color changes. ? Call your doctor if you think you are having a problem with your iron pills. Even after you start to feel better, it will take several months for your body to build up its supply of iron. ? If you miss a pill, don't take a double dose. ? Keep iron pills out of the reach of small children. Too much iron can be very dangerous. · Eat foods with a lot of iron. These include red meat, shellfish, poultry, and eggs. They also include beans, raisins, whole-grain bread, and leafy green vegetables. · Steam your vegetables. This is the best way to prepare them if you want to get as much iron as possible. · Be safe with medicines. Do not take nonsteroidal anti-inflammatory pain relievers unless your doctor tells you to. These include aspirin, naproxen (Aleve), and ibuprofen (Advil, Motrin). · Liquid iron can stain your teeth. But you can mix it with water or juice and drink it with a straw. Then it won't get on your teeth. When should you call for help? Call 911 anytime you think you may need emergency care. For example, call if: 
  · You passed out (lost consciousness).  
 Call your doctor now or seek immediate medical care if: 
  · You are short of breath.  
  · You are dizzy or light-headed, or you feel like you may faint.  
  · You have new or worse bleeding.  
 Watch closely for changes in your health, and be sure to contact your doctor if: 
  · You feel weaker or more tired than usual.  
  · You do not get better as expected. Where can you learn more? Go to http://cleo-monika.info/. Enter X053 in the search box to learn more about \"Iron Deficiency Anemia: Care Instructions. \" Current as of: May 7, 2018 Content Version: 11.8 © 4581-3268 [employer].  Care instructions adapted under license by [address] (which disclaims liability or warranty for this information). If you have questions about a medical condition or this instruction, always ask your healthcare professional. [address] any warranty or liability for your use of this information.

## 2019-01-11 NOTE — PROGRESS NOTES
Hematology/Oncology  Progress Note Name: Alex Cherry Date: 2019 : 1955 PCP: Tonio Davis MD  
 
Mr. Amy Temple is a 61 y.o. man with a history of early myelodysplastic syndrome and chronic anemia. Current therapy: Active surveillance with plans to be appropriate if his hemoglobin declines below 10 g/dL. Subjective:  
 
Mr. Amy Temple is a 80-year-old man with early myelodysplastic syndrome chronic anemia. Today the patient reports that he feels fine and has no new physical complaints or concerns to report. Past medical history, family history, and social history: these were reviewed and remains unchanged. Past Medical History:  
Diagnosis Date  Chronic kidney disease  Diabetes mellitus  Essential hypertension  Kidney disease  Prostate cancer ([facility] [address] Past Surgical History:  
Procedure Laterality Date [address]  HX OTHER SURGICAL   Prostate Sx r/t cancer (seed implant)  HX OTHER SURGICAL    
 shoulder surgery Social History Socioeconomic History  Marital status:  Spouse name: Not on file  Number of children: Not on file  Years of education: Not on file  Highest education level: Not on file Social Needs  Financial resource strain: Not on file  Food insecurity - worry: Not on file  Food insecurity - inability: Not on file  Transportation needs - medical: Not on file  Transportation needs - non-medical: Not on file Occupational History  Not on file Tobacco Use  Smoking status: Former Smoker Packs/day: 0.25  Smokeless tobacco: Never Used  Tobacco comment: Patient quit in , and started back. Light Smoker now Substance and Sexual Activity  Alcohol use: No  
  Alcohol/week: 0.0 oz  Drug use: No  
 Sexual activity: Not on file Other Topics Concern  Not on file Social History Narrative  Not on file Family History Problem Relation Age of Onset  Diabetes Father  No Known Problems Brother  Other Brother PE  
 
Current Outpatient Medications Medication Sig Dispense Refill  insulin aspart protamine/insulin aspart (NOVOLOG MIX 70-30FLEXPEN U-100) 100 unit/mL (70-30) inpn 12 Units by SubCUTAneous route Before breakfast, lunch, and dinner.  insulin glargine (LANTUS) 100 unit/mL injection 50 Units by SubCUTAneous route every twelve (12) hours.  ARIPiprazole (ABILIFY) 10 mg tablet Take 10 mg by mouth daily.  zolpidem (AMBIEN) 5 mg tablet Take  by mouth nightly as needed for Sleep.  acetaminophen (TYLENOL) 500 mg tablet Take 500 mg by mouth every six (6) hours as needed for Pain.  rosuvastatin (CRESTOR) 40 mg tablet Take 40 mg by mouth nightly.  hydroCHLOROthiazide 25 mg tab 25 mg, lisinopril 20 mg tab 20 mg Take  by mouth daily. HCTZ 12.5 mg / Lisinopril 20 mg, PO Daily  venlafaxine (EFFEXOR) 75 mg tablet Take 75 mg by mouth daily.  multivitamin (ONE A DAY) tablet Take 1 Tab by mouth daily.  LORazepam (ATIVAN) 1 mg tablet Take 1 mg by mouth every twelve (12) hours as needed for Anxiety.  aspirin 81 mg chewable tablet Take 81 mg by mouth daily.  cyclobenzaprine (FLEXERIL) 10 mg tablet Take 1 Tab by mouth three (3) times daily as needed for Muscle Spasm(s). 60 Tab 0  
 metFORMIN (GLUCOPHAGE) 1,000 mg tablet Take 1,000 mg by mouth two (2) times daily (with meals).  diclofenac EC (VOLTAREN) 75 mg EC tablet Take 1 Tab by mouth two (2) times daily (with meals). 60 Tab 2  
 donepezil (ARICEPT) 10 mg tablet Take 10 mg by mouth nightly.  LUNESTA 3 mg tablet Take 3 mg by mouth nightly. Review of Systems Constitutional: The patient has no acute distress or discomfort. HEENT: The patient denies recent head trauma, eye pain, blurred vision,  hearing deficit, oropharyngeal mucosal pain or lesions, and the patient denies throat pain or discomfort. Lymphatics: The patient denies palpable peripheral lymphadenopathy. Hematologic: The patient denies having bruising, bleeding, or progressive fatigue. Respiratory: Patient denies having shortness of breath, cough, sputum production, fever, or dyspnea on exertion. Cardiovascular: The patient denies having leg pain, leg swelling, heart palpitations, chest permit, chest pain, or lightheadedness. The patient denies having dyspnea on exertion. Gastrointestinal: The patient denies having nausea, emesis, or diarrhea. The patient denies having any hematemesis or blood in the stool. Genitourinary: Patient denies having urinary urgency, frequency, or dysuria. The patient denies having blood in the urine. Psychological: The patient denies having symptoms of nervousness, anxiety, depression, or thoughts of harming self. Skin: Patient denies having skin rashes, skin, ulcerations, or unexplained itching or pruritus. Musculoskeletal: The patient denies having pain in the joints or bones. Objective:  
 
Visit Vitals BP (!) 113/91 Pulse 73 Temp 97.9 °F (36.6 °C) (Oral) Resp 16 Ht 5' 9\" (1.753 m) Wt 88.9 kg (196 lb) SpO2 100% BMI 28.94 kg/m² ECOG PS=0 Physical Exam:  
Gen. Appearance: The patient is in no acute distress. Skin: There is no bruise or rash. HEENT: The exam is unremarkable. Neck: Supple without lymphadenopathy or thyromegaly. Lungs: Clear to auscultation and percussion; there are no wheezes or rhonchi. Heart: Regular rate and rhythm; there are no murmurs, gallops, or rubs. Abdomen: Bowel sounds are present and normal.  There is no guarding, tenderness, or hepatosplenomegaly. Extremities: There is no clubbing, cyanosis, or edema. Neurologic: There are no focal neurologic deficits. Lymphatics: There is no palpable peripheral lymphadenopathy. Musculoskeletal: The patient has full range of motion at all joints.   There is no evidence of joint deformity or effusions. There is no focal joint tenderness. Psychological/psychiatric: There is no clinical evidence of anxiety, depression, or melancholy. Lab data: 
   
Results for orders placed or performed during the hospital encounter of 01/11/19 CBC WITH 3 PART DIFF     Status: Abnormal  
Result Value Ref Range Status WBC 4.5 4.5 - 13.0 K/uL Final  
 RBC 3.90 (L) 4.10 - 5.10 M/uL Final  
 HGB 11.4 (L) 12.0 - 16.0 g/dL Final  
 HCT 33.3 (L) 36 - 48 % Final  
 MCV 85.4 78 - 102 FL Final  
 MCH 29.2 25.0 - 35.0 PG Final  
 MCHC 34.2 31 - 37 g/dL Final  
 RDW 12.7 11.5 - 14.5 % Final  
 PLATELET 269 916 - 810 K/uL Final  
 NEUTROPHILS 58 40 - 70 % Final  
 MIXED CELLS 9 0.1 - 17 % Final  
 LYMPHOCYTES 33 14 - 44 % Final  
 ABS. NEUTROPHILS 2.6 1.8 - 9.5 K/UL Final  
 ABS. MIXED CELLS 0.4 0.0 - 2.3 K/uL Final  
 ABS. LYMPHOCYTES 1.5 1.1 - 5.9 K/UL Final  
  Comment: Test performed at [place] [address] Location. Results Reviewed by Medical Director. DF AUTOMATED   Final  
 
 
   
Assessment: 1. Chronic anemia 2. Myelodysplastic syndrome ([facility] [address] 3. Iron deficiency anemia secondary to inadequate dietary iron intake Plan:  
Chronic anemia/iron deficiency anemia secondary to inadequate iron intake: I have explained to the patient on 10/19/2018 is available 68 ng/mL, iron was 53 mcg/dL, and the iron saturation was 16%. The patient is currently taking oral iron supplementation once daily. This will continue Early myelodysplastic syndrome: Have explained to the patient that his CBC from today is showing hemoglobin of 11.4 g/dL with hematocrit of 33.3%. We will continue to monitor him at 3-month intervals. Therapeutic intervention with subcutaneous Procrit will not be indicated unless his hemoglobin declines below 10 g/dL and hematocrit declines below 30%. Follow-up in 3 months. Orders Placed This Encounter  COMPLETE CBC & AUTO DIFF WBC  InHouse CBC ("[employer] Standing Status:   Future Number of Occurrences:   1 Standing Expiration Date:   1/18/2019  METABOLIC PANEL, COMPREHENSIVE Standing Status:   Future Number of Occurrences:   1 Standing Expiration Date:   1/12/2020  FERRITIN Standing Status:   Future Number of Occurrences:   1 Standing Expiration Date:   1/12/2020  
 IRON PROFILE Standing Status:   Future Number of Occurrences:   1 Standing Expiration Date:   1/12/2020 eMrna Gan MD 
1/11/2019 Please note: This document has been produced using voice recognition software. Unrecognized errors in transcription may be present.

## 2019-01-12 LAB
ALBUMIN SERPL-MCNC: 4.3 G/DL (ref 3.6–4.8)
ALBUMIN/GLOB SERPL: 1.4 {RATIO} (ref 1.2–2.2)
ALP SERPL-CCNC: 94 IU/L (ref 39–117)
ALT SERPL-CCNC: 17 IU/L (ref 0–44)
AST SERPL-CCNC: 23 IU/L (ref 0–40)
BILIRUB SERPL-MCNC: <0.2 MG/DL (ref 0–1.2)
BUN SERPL-MCNC: 24 MG/DL (ref 8–27)
BUN/CREAT SERPL: 21 (ref 10–24)
CALCIUM SERPL-MCNC: 9.8 MG/DL (ref 8.6–10.2)
CHLORIDE SERPL-SCNC: 98 MMOL/L (ref 96–106)
CO2 SERPL-SCNC: 22 MMOL/L (ref 20–29)
CREAT SERPL-MCNC: 1.13 MG/DL (ref 0.76–1.27)
FERRITIN SERPL-MCNC: 111 NG/ML (ref 30–400)
GLOBULIN SER CALC-MCNC: 3.1 G/DL (ref 1.5–4.5)
GLUCOSE SERPL-MCNC: 393 MG/DL (ref 65–99)
IRON SATN MFR SERPL: 17 % (ref 15–55)
IRON SERPL-MCNC: 65 UG/DL (ref 38–169)
POTASSIUM SERPL-SCNC: 5.4 MMOL/L (ref 3.5–5.2)
PROT SERPL-MCNC: 7.4 G/DL (ref 6–8.5)
SODIUM SERPL-SCNC: 136 MMOL/L (ref 134–144)
TIBC SERPL-MCNC: 379 UG/DL (ref 250–450)
UIBC SERPL-MCNC: 314 UG/DL (ref 111–343)

## 2019-01-14 NOTE — PROGRESS NOTES
Patient states his PCP is aware of his blood glucose. He is on lantus and metformin. Today when he checked his sugar it was 343mg/dL.

## 2019-01-18 ENCOUNTER — HOSPITAL ENCOUNTER (OUTPATIENT)
Dept: ONCOLOGY | Age: 64
Discharge: HOME | End: 2019-01-18

## 2019-01-18 ENCOUNTER — APPOINTMENT (OUTPATIENT)
Dept: INFUSION THERAPY | Age: 64
End: 2019-01-18
Payer: MEDICARE

## 2019-01-18 ENCOUNTER — CLINICAL SUPPORT (OUTPATIENT)
Dept: ONCOLOGY | Age: 64
End: 2019-01-18

## 2019-01-18 ENCOUNTER — HOSPITAL ENCOUNTER (OUTPATIENT)
Dept: INFUSION THERAPY | Age: 64
Discharge: HOME | End: 2019-01-18
Payer: MEDICARE

## 2019-01-18 VITALS
TEMPERATURE: 98.4 F | HEART RATE: 86 BPM | RESPIRATION RATE: 16 BRPM | DIASTOLIC BLOOD PRESSURE: 74 MMHG | SYSTOLIC BLOOD PRESSURE: 138 MMHG

## 2019-01-18 DIAGNOSIS — D50.9 IRON DEFICIENCY ANEMIA, UNSPECIFIED IRON DEFICIENCY ANEMIA TYPE: ICD-10-CM

## 2019-01-18 DIAGNOSIS — D50.9 IRON DEFICIENCY ANEMIA, UNSPECIFIED IRON DEFICIENCY ANEMIA TYPE: Primary | ICD-10-CM

## 2019-01-18 LAB
BASO+EOS+MONOS # BLD AUTO: 0.3 K/UL (ref 0–2.3)
BASO+EOS+MONOS NFR BLD AUTO: 7 % (ref 0.1–17)
DIFFERENTIAL METHOD BLD: ABNORMAL
ERYTHROCYTE [DISTWIDTH] IN BLOOD BY AUTOMATED COUNT: 12.7 % (ref 11.5–14.5)
HCT VFR BLD AUTO: 34.5 % (ref 36–48)
HGB BLD-MCNC: 11.7 G/DL (ref 12–16)
LYMPHOCYTES # BLD: 1.7 K/UL (ref 1.1–5.9)
LYMPHOCYTES NFR BLD: 39 % (ref 14–44)
MCH RBC QN AUTO: 28.8 PG (ref 25–35)
MCHC RBC AUTO-ENTMCNC: 33.9 G/DL (ref 31–37)
MCV RBC AUTO: 85 FL (ref 78–102)
NEUTS SEG # BLD: 2.5 K/UL (ref 1.8–9.5)
NEUTS SEG NFR BLD: 55 % (ref 40–70)
PLATELET # BLD AUTO: 294 K/UL (ref 140–440)
RBC # BLD AUTO: 4.06 M/UL (ref 4.1–5.1)
WBC # BLD AUTO: 4.5 K/UL (ref 4.5–13)

## 2019-01-18 PROCEDURE — 36415 COLL VENOUS BLD VENIPUNCTURE: CPT

## 2019-01-18 NOTE — PROGRESS NOTES
[facility] Progress Note Date: 2019 Name: Clara Cantu MRN: [id number] : 1955 Mr. Cande Coon was assessed and education was provided. Mr. Cheyenne Tan vitals were reviewed and patient was observed for 5 minutes prior to treatment. Visit Vitals /74 (BP 1 Location: Right arm, BP Patient Position: At rest;Sitting) Pulse 86 Temp 98.4 °F (36.9 °C) Resp 16 Lab results were obtained and reviewed. Recent Results (from the past 12 hour(s)) CBC WITH 3 PART DIFF Collection Time: 19  1:24 PM  
Result Value Ref Range WBC 4.5 4.5 - 13.0 K/uL  
 RBC 4.06 (L) 4.10 - 5.10 M/uL  
 HGB 11.7 (L) 12.0 - 16.0 g/dL HCT 34.5 (L) 36 - 48 % MCV 85.0 78 - 102 FL  
 MCH 28.8 25.0 - 35.0 PG  
 MCHC 33.9 31 - 37 g/dL  
 RDW 12.7 11.5 - 14.5 % PLATELET 036 093 - 381 K/uL NEUTROPHILS 55 40 - 70 % MIXED CELLS 7 0.1 - 17 % LYMPHOCYTES 39 14 - 44 % ABS. NEUTROPHILS 2.5 1.8 - 9.5 K/UL  
 ABS. MIXED CELLS 0.3 0.0 - 2.3 K/uL  
 ABS. LYMPHOCYTES 1.7 1.1 - 5.9 K/UL  
 DF AUTOMATED Procrit not given for H&H 11.7/34.5  Patient armband removed and shredded. Mr. Cande Coon was discharged from [place] [address] in stable condition at 1330. He is to return on 19 at 1400 for his next appointment for CBC/Procrit Q 2 wks.  
 
Wicho Walls RN 
2019 
2:26 PM

## 2019-02-01 ENCOUNTER — APPOINTMENT (OUTPATIENT)
Dept: INFUSION THERAPY | Age: 64
End: 2019-02-01
Payer: MEDICARE

## 2019-02-01 ENCOUNTER — CLINICAL SUPPORT (OUTPATIENT)
Dept: ONCOLOGY | Age: 64
End: 2019-02-01

## 2019-02-01 ENCOUNTER — HOSPITAL ENCOUNTER (OUTPATIENT)
Dept: ONCOLOGY | Age: 64
Discharge: HOME | End: 2019-02-01

## 2019-02-01 ENCOUNTER — HOSPITAL ENCOUNTER (OUTPATIENT)
Dept: INFUSION THERAPY | Age: 64
Discharge: HOME | End: 2019-02-01
Payer: MEDICARE

## 2019-02-01 VITALS
TEMPERATURE: 98 F | HEART RATE: 70 BPM | SYSTOLIC BLOOD PRESSURE: 135 MMHG | DIASTOLIC BLOOD PRESSURE: 72 MMHG | RESPIRATION RATE: 16 BRPM

## 2019-02-01 DIAGNOSIS — D64.9 CHRONIC ANEMIA: ICD-10-CM

## 2019-02-01 DIAGNOSIS — D64.9 CHRONIC ANEMIA: Primary | ICD-10-CM

## 2019-02-01 LAB
BASO+EOS+MONOS # BLD AUTO: 0.4 K/UL (ref 0–2.3)
BASO+EOS+MONOS NFR BLD AUTO: 10 % (ref 0.1–17)
DIFFERENTIAL METHOD BLD: ABNORMAL
ERYTHROCYTE [DISTWIDTH] IN BLOOD BY AUTOMATED COUNT: 12.8 % (ref 11.5–14.5)
HCT VFR BLD AUTO: 31 % (ref 36–48)
HGB BLD-MCNC: 10.5 G/DL (ref 12–16)
LYMPHOCYTES # BLD: 1.9 K/UL (ref 1.1–5.9)
LYMPHOCYTES NFR BLD: 46 % (ref 14–44)
MCH RBC QN AUTO: 29.9 PG (ref 25–35)
MCHC RBC AUTO-ENTMCNC: 33.9 G/DL (ref 31–37)
MCV RBC AUTO: 88.3 FL (ref 78–102)
NEUTS SEG # BLD: 1.9 K/UL (ref 1.8–9.5)
NEUTS SEG NFR BLD: 44 % (ref 40–70)
PLATELET # BLD AUTO: 226 K/UL (ref 140–440)
RBC # BLD AUTO: 3.51 M/UL (ref 4.1–5.1)
WBC # BLD AUTO: 4.2 K/UL (ref 4.5–13)

## 2019-02-01 PROCEDURE — 36415 COLL VENOUS BLD VENIPUNCTURE: CPT

## 2019-02-01 NOTE — PROGRESS NOTES
[facility] Progress Note Date: 2019 Name: Katarina Perez MRN: [id number] : 1955 Mr. Friedman Nurse was assessed and education was provided. Mr. Radha Broussard vitals were reviewed and patient was observed for 5 minutes prior to treatment. Visit Vitals /72 (BP 1 Location: Right arm, BP Patient Position: At rest;Sitting) Pulse 70 Temp 98 °F (36.7 °C) Resp 16 Lab results were obtained and reviewed. Recent Results (from the past 12 hour(s)) CBC WITH 3 PART DIFF Collection Time: 19  2:21 PM  
Result Value Ref Range WBC 4.2 (L) 4.5 - 13.0 K/uL  
 RBC 3.51 (L) 4.10 - 5.10 M/uL  
 HGB 10.5 (L) 12.0 - 16.0 g/dL HCT 31.0 (L) 36 - 48 % MCV 88.3 78 - 102 FL  
 MCH 29.9 25.0 - 35.0 PG  
 MCHC 33.9 31 - 37 g/dL  
 RDW 12.8 11.5 - 14.5 % PLATELET 932 275 - 541 K/uL NEUTROPHILS 44 40 - 70 % MIXED CELLS 10 0.1 - 17 % LYMPHOCYTES 46 (H) 14 - 44 % ABS. NEUTROPHILS 1.9 1.8 - 9.5 K/UL  
 ABS. MIXED CELLS 0.4 0.0 - 2.3 K/uL  
 ABS. LYMPHOCYTES 1.9 1.1 - 5.9 K/UL  
 DF AUTOMATED Procrit not given for H&H 10.5/31.0. Patient armband removed and shredded. Mr. Idalia Can was discharged from [place] [address] in stable condition at 1435. He is to return on 2/15/19 at 1300 for his next appointment for CBC/Procrit Q 2 wks. Nakita Aaron RN 2019 
2:38 PM

## 2019-02-15 ENCOUNTER — HOSPITAL ENCOUNTER (OUTPATIENT)
Dept: ONCOLOGY | Age: 64
Discharge: HOME | End: 2019-02-15

## 2019-02-15 ENCOUNTER — CLINICAL SUPPORT (OUTPATIENT)
Dept: ONCOLOGY | Age: 64
End: 2019-02-15

## 2019-02-15 ENCOUNTER — HOSPITAL ENCOUNTER (OUTPATIENT)
Dept: INFUSION THERAPY | Age: 64
Discharge: HOME | End: 2019-02-15
Payer: MEDICARE

## 2019-02-15 VITALS
RESPIRATION RATE: 16 BRPM | DIASTOLIC BLOOD PRESSURE: 66 MMHG | HEART RATE: 70 BPM | SYSTOLIC BLOOD PRESSURE: 118 MMHG | TEMPERATURE: 96.6 F

## 2019-02-15 DIAGNOSIS — D64.9 CHRONIC ANEMIA: ICD-10-CM

## 2019-02-15 DIAGNOSIS — D64.9 CHRONIC ANEMIA: Primary | ICD-10-CM

## 2019-02-15 LAB
BASO+EOS+MONOS # BLD AUTO: 0.3 K/UL (ref 0–2.3)
BASO+EOS+MONOS NFR BLD AUTO: 7 % (ref 0.1–17)
DIFFERENTIAL METHOD BLD: ABNORMAL
ERYTHROCYTE [DISTWIDTH] IN BLOOD BY AUTOMATED COUNT: 13.1 % (ref 11.5–14.5)
HCT VFR BLD AUTO: 31.4 % (ref 36–48)
HGB BLD-MCNC: 10.7 G/DL (ref 12–16)
LYMPHOCYTES # BLD: 1.7 K/UL (ref 1.1–5.9)
LYMPHOCYTES NFR BLD: 41 % (ref 14–44)
MCH RBC QN AUTO: 30 PG (ref 25–35)
MCHC RBC AUTO-ENTMCNC: 34.1 G/DL (ref 31–37)
MCV RBC AUTO: 88 FL (ref 78–102)
NEUTS SEG # BLD: 2.1 K/UL (ref 1.8–9.5)
NEUTS SEG NFR BLD: 52 % (ref 40–70)
PLATELET # BLD AUTO: 249 K/UL (ref 140–440)
RBC # BLD AUTO: 3.57 M/UL (ref 4.1–5.1)
WBC # BLD AUTO: 4.1 K/UL (ref 4.5–13)

## 2019-02-15 PROCEDURE — 36415 COLL VENOUS BLD VENIPUNCTURE: CPT

## 2019-02-15 NOTE — PROGRESS NOTES
[facility] Progress Note Date: February 15, 2019 Name: Murtaza Faye MRN: [id number] : 1955 Mr. Yamini Tillman was assessed and education was provided. Mr. Clark Hitchcock vitals were reviewed and patient was observed for 5 minutes prior to treatment. Visit Vitals /66 (BP 1 Location: Left arm, BP Patient Position: At rest;Sitting) Pulse 70 Temp 96.6 °F (35.9 °C) Resp 16 Lab results were obtained and reviewed. Recent Results (from the past 12 hour(s)) CBC WITH 3 PART DIFF Collection Time: 02/15/19  1:28 PM  
Result Value Ref Range WBC 4.1 (L) 4.5 - 13.0 K/uL  
 RBC 3.57 (L) 4.10 - 5.10 M/uL  
 HGB 10.7 (L) 12.0 - 16.0 g/dL HCT 31.4 (L) 36 - 48 % MCV 88.0 78 - 102 FL  
 MCH 30.0 25.0 - 35.0 PG  
 MCHC 34.1 31 - 37 g/dL  
 RDW 13.1 11.5 - 14.5 % PLATELET 697 193 - 506 K/uL NEUTROPHILS 52 40 - 70 % MIXED CELLS 7 0.1 - 17 % LYMPHOCYTES 41 14 - 44 % ABS. NEUTROPHILS 2.1 1.8 - 9.5 K/UL  
 ABS. MIXED CELLS 0.3 0.0 - 2.3 K/uL  
 ABS. LYMPHOCYTES 1.7 1.1 - 5.9 K/UL  
 DF AUTOMATED Procrit not given for H&H 10.7/31.4. Patient armband removed and shredded. Mr. Yamini Tillman was discharged from [place] [address] in stable condition at [phone]. He is to return on 3/01//19 at 1300 for his next appointment for CBC/Procrit Q 2 wks. Rachid Cuellar RN February 15, 2019

## 2019-03-01 ENCOUNTER — HOSPITAL ENCOUNTER (OUTPATIENT)
Dept: ONCOLOGY | Age: 64
Discharge: HOME | End: 2019-03-01

## 2019-03-01 ENCOUNTER — HOSPITAL ENCOUNTER (OUTPATIENT)
Dept: INFUSION THERAPY | Age: 64
Discharge: HOME | End: 2019-03-01
Payer: MEDICARE

## 2019-03-01 ENCOUNTER — CLINICAL SUPPORT (OUTPATIENT)
Dept: ONCOLOGY | Age: 64
End: 2019-03-01

## 2019-03-01 VITALS
SYSTOLIC BLOOD PRESSURE: 131 MMHG | HEART RATE: 65 BPM | RESPIRATION RATE: 16 BRPM | TEMPERATURE: 97.4 F | DIASTOLIC BLOOD PRESSURE: 69 MMHG

## 2019-03-01 DIAGNOSIS — D64.9 CHRONIC ANEMIA: ICD-10-CM

## 2019-03-01 DIAGNOSIS — D64.9 CHRONIC ANEMIA: Primary | ICD-10-CM

## 2019-03-01 LAB
BASO+EOS+MONOS # BLD AUTO: 0.3 K/UL (ref 0–2.3)
BASO+EOS+MONOS NFR BLD AUTO: 8 % (ref 0.1–17)
DIFFERENTIAL METHOD BLD: ABNORMAL
ERYTHROCYTE [DISTWIDTH] IN BLOOD BY AUTOMATED COUNT: 13.2 % (ref 11.5–14.5)
HCT VFR BLD AUTO: 34 % (ref 36–48)
HGB BLD-MCNC: 11.1 G/DL (ref 12–16)
LYMPHOCYTES # BLD: 1.4 K/UL (ref 1.1–5.9)
LYMPHOCYTES NFR BLD: 36 % (ref 14–44)
MCH RBC QN AUTO: 29.4 PG (ref 25–35)
MCHC RBC AUTO-ENTMCNC: 32.6 G/DL (ref 31–37)
MCV RBC AUTO: 89.9 FL (ref 78–102)
NEUTS SEG # BLD: 2.3 K/UL (ref 1.8–9.5)
NEUTS SEG NFR BLD: 56 % (ref 40–70)
PLATELET # BLD AUTO: 273 K/UL (ref 140–440)
RBC # BLD AUTO: 3.78 M/UL (ref 4.1–5.1)
WBC # BLD AUTO: 4 K/UL (ref 4.5–13)

## 2019-03-01 PROCEDURE — 36415 COLL VENOUS BLD VENIPUNCTURE: CPT

## 2019-03-01 NOTE — PROGRESS NOTES
[facility] Progress Note Date: 2019 Name: Debbie Vidales MRN: [id number] : 1955 Mr. Sarkar was assessed and education was provided. Mr. Lucrecia Valadez vitals were reviewed and patient was observed for 5 minutes prior to treatment. Visit Vitals /69 (BP 1 Location: Left arm, BP Patient Position: At rest;Standing) Pulse 65 Temp 97.4 °F (36.3 °C) Resp 16 Lab results were obtained and reviewed. Recent Results (from the past 12 hour(s)) CBC WITH 3 PART DIFF Collection Time: 19  1:15 PM  
Result Value Ref Range WBC 4.0 (L) 4.5 - 13.0 K/uL  
 RBC 3.78 (L) 4.10 - 5.10 M/uL  
 HGB 11.1 (L) 12.0 - 16.0 g/dL HCT 34.0 (L) 36 - 48 % MCV 89.9 78 - 102 FL  
 MCH 29.4 25.0 - 35.0 PG  
 MCHC 32.6 31 - 37 g/dL  
 RDW 13.2 11.5 - 14.5 % PLATELET 440 562 - 568 K/uL NEUTROPHILS 56 40 - 70 % MIXED CELLS 8 0.1 - 17 % LYMPHOCYTES 36 14 - 44 % ABS. NEUTROPHILS 2.3 1.8 - 9.5 K/UL  
 ABS. MIXED CELLS 0.3 0.0 - 2.3 K/uL  
 ABS. LYMPHOCYTES 1.4 1.1 - 5.9 K/UL  
 DF AUTOMATED Procrit not given for H&H 11.1/34.0. Patient armband removed and shredded. Mr. Sarkar was discharged from [place] [address] in stable condition at [phone]. He is to return on 3/15//19 at 1300 for his next appointment for CBC/Procrit Q 2 wks. Karolina Sheets RN 
2019

## 2019-03-15 ENCOUNTER — HOSPITAL ENCOUNTER (OUTPATIENT)
Dept: ONCOLOGY | Age: 64
Discharge: HOME | End: 2019-03-15

## 2019-03-15 ENCOUNTER — HOSPITAL ENCOUNTER (OUTPATIENT)
Dept: INFUSION THERAPY | Age: 64
Discharge: HOME | End: 2019-03-15
Payer: MEDICARE

## 2019-03-15 ENCOUNTER — CLINICAL SUPPORT (OUTPATIENT)
Dept: ONCOLOGY | Age: 64
End: 2019-03-15

## 2019-03-15 VITALS
SYSTOLIC BLOOD PRESSURE: 126 MMHG | TEMPERATURE: 98 F | HEART RATE: 78 BPM | RESPIRATION RATE: 16 BRPM | DIASTOLIC BLOOD PRESSURE: 64 MMHG

## 2019-03-15 DIAGNOSIS — D46.9 MDS (MYELODYSPLASTIC SYNDROME) (HCC): ICD-10-CM

## 2019-03-15 DIAGNOSIS — D46.9 MDS (MYELODYSPLASTIC SYNDROME) (HCC): Primary | ICD-10-CM

## 2019-03-15 LAB
BASO+EOS+MONOS # BLD AUTO: 0.3 K/UL (ref 0–2.3)
BASO+EOS+MONOS NFR BLD AUTO: 6 % (ref 0.1–17)
DIFFERENTIAL METHOD BLD: ABNORMAL
ERYTHROCYTE [DISTWIDTH] IN BLOOD BY AUTOMATED COUNT: 12.2 % (ref 11.5–14.5)
HCT VFR BLD AUTO: 32.7 % (ref 36–48)
HGB BLD-MCNC: 10.9 G/DL (ref 12–16)
LYMPHOCYTES # BLD: 1.4 K/UL (ref 1.1–5.9)
LYMPHOCYTES NFR BLD: 32 % (ref 14–44)
MCH RBC QN AUTO: 29.9 PG (ref 25–35)
MCHC RBC AUTO-ENTMCNC: 33.3 G/DL (ref 31–37)
MCV RBC AUTO: 89.8 FL (ref 78–102)
NEUTS SEG # BLD: 2.7 K/UL (ref 1.8–9.5)
NEUTS SEG NFR BLD: 62 % (ref 40–70)
PLATELET # BLD AUTO: 275 K/UL (ref 140–440)
RBC # BLD AUTO: 3.64 M/UL (ref 4.1–5.1)
WBC # BLD AUTO: 4.4 K/UL (ref 4.5–13)

## 2019-03-15 PROCEDURE — 36415 COLL VENOUS BLD VENIPUNCTURE: CPT

## 2019-03-29 ENCOUNTER — HOSPITAL ENCOUNTER (OUTPATIENT)
Dept: INFUSION THERAPY | Age: 64
Discharge: HOME | End: 2019-03-29
Payer: MEDICARE

## 2019-03-29 ENCOUNTER — CLINICAL SUPPORT (OUTPATIENT)
Dept: ONCOLOGY | Age: 64
End: 2019-03-29

## 2019-03-29 ENCOUNTER — HOSPITAL ENCOUNTER (OUTPATIENT)
Dept: ONCOLOGY | Age: 64
Discharge: HOME | End: 2019-03-29

## 2019-03-29 VITALS
HEART RATE: 77 BPM | SYSTOLIC BLOOD PRESSURE: 130 MMHG | TEMPERATURE: 97.9 F | RESPIRATION RATE: 16 BRPM | DIASTOLIC BLOOD PRESSURE: 71 MMHG

## 2019-03-29 DIAGNOSIS — D46.9 MYELODYSPLASTIC SYNDROME (HCC): Primary | ICD-10-CM

## 2019-03-29 DIAGNOSIS — D46.9 MYELODYSPLASTIC SYNDROME (HCC): ICD-10-CM

## 2019-03-29 LAB
BASO+EOS+MONOS # BLD AUTO: 0.3 K/UL (ref 0–2.3)
BASO+EOS+MONOS NFR BLD AUTO: 7 % (ref 0.1–17)
DIFFERENTIAL METHOD BLD: ABNORMAL
ERYTHROCYTE [DISTWIDTH] IN BLOOD BY AUTOMATED COUNT: 12.3 % (ref 11.5–14.5)
HCT VFR BLD AUTO: 30.3 % (ref 36–48)
HGB BLD-MCNC: 10.3 G/DL (ref 12–16)
LYMPHOCYTES # BLD: 1.3 K/UL (ref 1.1–5.9)
LYMPHOCYTES NFR BLD: 38 % (ref 14–44)
MCH RBC QN AUTO: 30.3 PG (ref 25–35)
MCHC RBC AUTO-ENTMCNC: 34 G/DL (ref 31–37)
MCV RBC AUTO: 89.1 FL (ref 78–102)
NEUTS SEG # BLD: 1.9 K/UL (ref 1.8–9.5)
NEUTS SEG NFR BLD: 55 % (ref 40–70)
PLATELET # BLD AUTO: 228 K/UL (ref 140–440)
RBC # BLD AUTO: 3.4 M/UL (ref 4.1–5.1)
WBC # BLD AUTO: 3.5 K/UL (ref 4.5–13)

## 2019-03-29 PROCEDURE — 36415 COLL VENOUS BLD VENIPUNCTURE: CPT

## 2019-03-29 NOTE — PROGRESS NOTES
[facility] Progress Note Date: 2019 Name: Valeria Yeboah MRN: [id number] : 1955 Mr. Olesya Mast was assessed and education was provided. Mr. Adonay Lobato vitals were reviewed and patient was observed for 5 minutes prior to treatment. Visit Vitals /71 (BP 1 Location: Left arm, BP Patient Position: At rest;Sitting) Pulse 77 Temp 97.9 °F (36.6 °C) Resp 16 Lab results were obtained and reviewed. Recent Results (from the past 12 hour(s)) CBC WITH 3 PART DIFF Collection Time: 19  1:04 PM  
Result Value Ref Range WBC 3.5 (L) 4.5 - 13.0 K/uL  
 RBC 3.40 (L) 4.10 - 5.10 M/uL  
 HGB 10.3 (L) 12.0 - 16.0 g/dL HCT 30.3 (L) 36 - 48 % MCV 89.1 78 - 102 FL  
 MCH 30.3 25.0 - 35.0 PG  
 MCHC 34.0 31 - 37 g/dL  
 RDW 12.3 11.5 - 14.5 % PLATELET 842 795 - 525 K/uL NEUTROPHILS 55 40 - 70 % MIXED CELLS 7 0.1 - 17 % LYMPHOCYTES 38 14 - 44 % ABS. NEUTROPHILS 1.9 1.8 - 9.5 K/UL  
 ABS. MIXED CELLS 0.3 0.0 - 2.3 K/uL  
 ABS. LYMPHOCYTES 1.3 1.1 - 5.9 K/UL  
 DF AUTOMATED Procrit not given for H&H 10.3/30.3. Patient armband removed and shredded. Mr. Olesya Mast was discharged from [place] [address] in stable condition at 1325. He is to return on 19 at 1300 for his next appointment for CBC/Procrit Q 2 wks. Odette Bains RN 
2019

## 2019-04-12 ENCOUNTER — HOSPITAL ENCOUNTER (OUTPATIENT)
Dept: LAB | Age: 64
Discharge: HOME | End: 2019-04-12
Payer: MEDICARE

## 2019-04-12 ENCOUNTER — HOSPITAL ENCOUNTER (OUTPATIENT)
Dept: ONCOLOGY | Age: 64
Discharge: HOME | End: 2019-04-12

## 2019-04-12 ENCOUNTER — OFFICE VISIT (OUTPATIENT)
Dept: ONCOLOGY | Age: 64
End: 2019-04-12

## 2019-04-12 VITALS
BODY MASS INDEX: 29.59 KG/M2 | RESPIRATION RATE: 16 BRPM | HEIGHT: 69 IN | HEART RATE: 78 BPM | OXYGEN SATURATION: 100 % | SYSTOLIC BLOOD PRESSURE: 138 MMHG | DIASTOLIC BLOOD PRESSURE: 64 MMHG | WEIGHT: 199.8 LBS | TEMPERATURE: 98.3 F

## 2019-04-12 DIAGNOSIS — D46.9 MYELODYSPLASTIC SYNDROME (HCC): ICD-10-CM

## 2019-04-12 DIAGNOSIS — D64.9 CHRONIC ANEMIA: ICD-10-CM

## 2019-04-12 DIAGNOSIS — D64.9 CHRONIC ANEMIA: Primary | ICD-10-CM

## 2019-04-12 LAB
ALBUMIN SERPL-MCNC: 3.7 G/DL (ref 3.4–5)
ALBUMIN/GLOB SERPL: 1.1 {RATIO} (ref 0.8–1.7)
ALP SERPL-CCNC: 88 U/L (ref 45–117)
ALT SERPL-CCNC: 24 U/L (ref 16–61)
ANION GAP SERPL CALC-SCNC: 6 MMOL/L (ref 3–18)
AST SERPL-CCNC: 23 U/L (ref 15–37)
BASO+EOS+MONOS # BLD AUTO: 0.3 K/UL (ref 0–2.3)
BASO+EOS+MONOS NFR BLD AUTO: 7 % (ref 0.1–17)
BILIRUB SERPL-MCNC: 0.3 MG/DL (ref 0.2–1)
BUN SERPL-MCNC: 22 MG/DL (ref 7–18)
BUN/CREAT SERPL: 17 (ref 12–20)
CALCIUM SERPL-MCNC: 8.5 MG/DL (ref 8.5–10.1)
CHLORIDE SERPL-SCNC: 100 MMOL/L (ref 100–108)
CO2 SERPL-SCNC: 30 MMOL/L (ref 21–32)
CREAT SERPL-MCNC: 1.3 MG/DL (ref 0.6–1.3)
DIFFERENTIAL METHOD BLD: ABNORMAL
ERYTHROCYTE [DISTWIDTH] IN BLOOD BY AUTOMATED COUNT: 12.3 % (ref 11.5–14.5)
FERRITIN SERPL-MCNC: 81 NG/ML (ref 8–388)
GLOBULIN SER CALC-MCNC: 3.3 G/DL (ref 2–4)
GLUCOSE SERPL-MCNC: 323 MG/DL (ref 74–99)
HCT VFR BLD AUTO: 32.3 % (ref 36–48)
HGB BLD-MCNC: 10.9 G/DL (ref 12–16)
IRON SATN MFR SERPL: 19 %
IRON SERPL-MCNC: 63 UG/DL (ref 50–175)
LYMPHOCYTES # BLD: 1.4 K/UL (ref 1.1–5.9)
LYMPHOCYTES NFR BLD: 32 % (ref 14–44)
MCH RBC QN AUTO: 30.4 PG (ref 25–35)
MCHC RBC AUTO-ENTMCNC: 33.7 G/DL (ref 31–37)
MCV RBC AUTO: 90 FL (ref 78–102)
NEUTS SEG # BLD: 2.7 K/UL (ref 1.8–9.5)
NEUTS SEG NFR BLD: 62 % (ref 40–70)
PLATELET # BLD AUTO: 246 K/UL (ref 140–440)
POTASSIUM SERPL-SCNC: 4.9 MMOL/L (ref 3.5–5.5)
PROT SERPL-MCNC: 7 G/DL (ref 6.4–8.2)
RBC # BLD AUTO: 3.59 M/UL (ref 4.1–5.1)
SODIUM SERPL-SCNC: 136 MMOL/L (ref 136–145)
TIBC SERPL-MCNC: 325 UG/DL (ref 250–450)
WBC # BLD AUTO: 4.4 K/UL (ref 4.5–13)

## 2019-04-12 PROCEDURE — 36415 COLL VENOUS BLD VENIPUNCTURE: CPT

## 2019-04-12 PROCEDURE — 80053 COMPREHEN METABOLIC PANEL: CPT

## 2019-04-12 PROCEDURE — 82728 ASSAY OF FERRITIN: CPT

## 2019-04-12 PROCEDURE — 83540 ASSAY OF IRON: CPT

## 2019-04-12 NOTE — PROGRESS NOTES
Hematology/Oncology  Progress Note Name: Benjie Mercado Date: 2019 : 1955 PCP: Martell Brunner, MD  
 
Mr. Jatin Lemos is a 61 y.o. man with a history of early myelodysplastic syndrome and chronic anemia. Current therapy: Retacrit whenever his hemoglobin is below 10g/dL and hematocrit below 30%. Subjective:  
 
Mr. Jatin Lemos is a 80-year-old man with early myelodysplastic syndrome chronic anemia. He states he is been feeling well since last visit. He denies fatigue, tiredness, and shortness of breath. He denies chest pain and dizziness. He also denies pain or any discomfort. He does not have any concerns or complaints to report at this time. Past medical history, family history, and social history: these were reviewed and remains unchanged. Past Medical History:  
Diagnosis Date  Chronic kidney disease  Diabetes mellitus  Essential hypertension  Kidney disease  Prostate cancer ([facility] [address] Past Surgical History:  
Procedure Laterality Date [address]  HX OTHER SURGICAL   Prostate Sx r/t cancer (seed implant)  HX OTHER SURGICAL    
 shoulder surgery Social History Socioeconomic History  Marital status:  Spouse name: Not on file  Number of children: Not on file  Years of education: Not on file  Highest education level: Not on file Occupational History  Not on file Social Needs  Financial resource strain: Not on file  Food insecurity:  
  Worry: Not on file Inability: Not on file  Transportation needs:  
  Medical: Not on file Non-medical: Not on file Tobacco Use  Smoking status: Former Smoker Packs/day: 0.25  Smokeless tobacco: Never Used  Tobacco comment: Patient quit in , and started back. Light Smoker now Substance and Sexual Activity  Alcohol use: No  
  Alcohol/week: 0.0 oz  Drug use: No  
 Sexual activity: Not on file Lifestyle  Physical activity: Days per week: Not on file Minutes per session: Not on file  Stress: Not on file Relationships  Social connections:  
  Talks on phone: Not on file Gets together: Not on file Attends Congregation service: Not on file Active member of club or organization: Not on file Attends meetings of clubs or organizations: Not on file Relationship status: Not on file  Intimate partner violence:  
  Fear of current or ex partner: Not on file Emotionally abused: Not on file Physically abused: Not on file Forced sexual activity: Not on file Other Topics Concern  Not on file Social History Narrative  Not on file Family History Problem Relation Age of Onset  Diabetes Father  No Known Problems Brother  Other Brother PE  
 
Current Outpatient Medications Medication Sig Dispense Refill  insulin aspart protamine/insulin aspart (NOVOLOG MIX 70-30FLEXPEN U-100) 100 unit/mL (70-30) inpn 12 Units by SubCUTAneous route Before breakfast, lunch, and dinner.  ARIPiprazole (ABILIFY) 10 mg tablet Take 10 mg by mouth daily.  zolpidem (AMBIEN) 5 mg tablet Take  by mouth nightly as needed for Sleep.  acetaminophen (TYLENOL) 500 mg tablet Take 500 mg by mouth every six (6) hours as needed for Pain.  rosuvastatin (CRESTOR) 40 mg tablet Take 40 mg by mouth nightly.  hydroCHLOROthiazide 25 mg tab 25 mg, lisinopril 20 mg tab 20 mg Take  by mouth daily. HCTZ 12.5 mg / Lisinopril 20 mg, PO Daily  venlafaxine (EFFEXOR) 75 mg tablet Take 75 mg by mouth daily.  multivitamin (ONE A DAY) tablet Take 1 Tab by mouth daily.  LORazepam (ATIVAN) 1 mg tablet Take 1 mg by mouth every twelve (12) hours as needed for Anxiety.  aspirin 81 mg chewable tablet Take 81 mg by mouth daily.  cyclobenzaprine (FLEXERIL) 10 mg tablet Take 1 Tab by mouth three (3) times daily as needed for Muscle Spasm(s).  60 Tab 0  
  metFORMIN (GLUCOPHAGE) 1,000 mg tablet Take 1,000 mg by mouth two (2) times daily (with meals).  diclofenac EC (VOLTAREN) 75 mg EC tablet Take 1 Tab by mouth two (2) times daily (with meals). 60 Tab 2  
 donepezil (ARICEPT) 10 mg tablet Take 10 mg by mouth nightly.  LUNESTA 3 mg tablet Take 3 mg by mouth nightly. Review of Systems Constitutional: The patient has no acute distress or discomfort. HEENT: The patient denies recent head trauma, eye pain, blurred vision,  hearing deficit, oropharyngeal mucosal pain or lesions, and the patient denies throat pain or discomfort. Lymphatics: The patient denies palpable peripheral lymphadenopathy. Hematologic: The patient denies having bruising, bleeding, or progressive fatigue. Respiratory: Patient denies having shortness of breath, cough, sputum production, fever, or dyspnea on exertion. Cardiovascular: The patient denies having leg pain, leg swelling, heart palpitations, chest permit, chest pain, or lightheadedness. The patient denies having dyspnea on exertion. Gastrointestinal: The patient denies having nausea, emesis, or diarrhea. The patient denies having any hematemesis or blood in the stool. Genitourinary: Patient denies having urinary urgency, frequency, or dysuria. The patient denies having blood in the urine. Psychological: The patient denies having symptoms of nervousness, anxiety, depression, or thoughts of harming self. Skin: Patient denies having skin rashes, skin, ulcerations, or unexplained itching or pruritus. Musculoskeletal: The patient denies having pain in the joints or bones. Objective:  
 
Visit Vitals /64 Pulse 78 Temp 98.3 °F (36.8 °C) (Oral) Resp 16 Ht 5' 9\" (1.753 m) Wt 90.6 kg (199 lb 12.8 oz) SpO2 100% BMI 29.51 kg/m² ECOG PS=0 Physical Exam:  
Gen. Appearance: The patient is in no acute distress.   Skin: There is no bruise or rash. HEENT: The exam is unremarkable. Neck: Supple without lymphadenopathy or thyromegaly. Lungs: Clear to auscultation and percussion; there are no wheezes or rhonchi. Heart: Regular rate and rhythm; there are no murmurs, gallops, or rubs. Abdomen: Bowel sounds are present and normal.  There is no guarding, tenderness, or hepatosplenomegaly. Extremities: There is no clubbing, cyanosis, or edema. Neurologic: There are no focal neurologic deficits. Lymphatics: There is no palpable peripheral lymphadenopathy. Musculoskeletal: The patient has full range of motion at all joints. There is no evidence of joint deformity or effusions. There is no focal joint tenderness. Psychological/psychiatric: There is no clinical evidence of anxiety, depression, or melancholy. Lab data: 
   
Results for orders placed or performed during the hospital encounter of 04/12/19 CBC WITH 3 PART DIFF     Status: Abnormal  
Result Value Ref Range Status WBC 4.4 (L) 4.5 - 13.0 K/uL Final  
 RBC 3.59 (L) 4.10 - 5.10 M/uL Final  
 HGB 10.9 (L) 12.0 - 16.0 g/dL Final  
 HCT 32.3 (L) 36 - 48 % Final  
 MCV 90.0 78 - 102 FL Final  
 MCH 30.4 25.0 - 35.0 PG Final  
 MCHC 33.7 31 - 37 g/dL Final  
 RDW 12.3 11.5 - 14.5 % Final  
 PLATELET 628 594 - 897 K/uL Final  
 NEUTROPHILS 62 40 - 70 % Final  
 MIXED CELLS 7 0.1 - 17 % Final  
 LYMPHOCYTES 32 14 - 44 % Final  
 ABS. NEUTROPHILS 2.7 1.8 - 9.5 K/UL Final  
 ABS. MIXED CELLS 0.3 0.0 - 2.3 K/uL Final  
 ABS. LYMPHOCYTES 1.4 1.1 - 5.9 K/UL Final  
  Comment: Test performed at [address] [place] [address] or Outpatient Infusion Center Location. Reviewed by Medical Director. DF AUTOMATED   Final  
 
 
   
Assessment: 1. Chronic anemia 2. Myelodysplastic syndrome ([facility] [address] Plan:  
Chronic anemia/iron deficiency anemia secondary to inadequate iron intake: I have explained to the patient that today his CBC shows his hemoglobin is 10. 9g/dL with hematocrit of 32.3%. On 1/11/19 his ferritin level was 111, iron at 65 with Iron % saturation of 17%. He was advised to increase his iron pill to twice daily. Retacrit will be provided whenever his hemoglobin declines below 10g/dL and hematocrit below 30%. Iron Profile and ferritin level will be obtained at this time. Early myelodysplastic syndrome: I have explained to the patient that today his CBC shows his WBC is 4.4K/uL hemoglobin is 10.9g/dL with hematocrit of 32.3% and his platelet count is normal at 246,000. On 1/11/19 his ferritin level was 111, iron at 65 with Iron % saturation of 17%. He was advised to increase his iron pill to twice daily. Retacrit will be provided whenever his hemoglobin declines below 10g/dL and hematocrit below 30%. Iron Profile and ferritin level will be obtained along with CMP. Follow-up in 3 months or sooner if indicated. Orders Placed This Encounter  COMPLETE CBC & AUTO DIFF WBC  InHouse CBC ([place]) Standing Status:   Future Number of Occurrences:   1 Standing Expiration Date:   4/19/2019  
 IRON PROFILE Standing Status:   Future Standing Expiration Date:   4/12/2020  METABOLIC PANEL, COMPREHENSIVE Standing Status:   Future Standing Expiration Date:   4/12/2020  FERRITIN Standing Status:   Future Standing Expiration Date:   4/12/2020 Avis Ariza NP 
04/12/2019 I have assessed the patient independently and  agree with the full assessment as outlined.  
Silviano Izaguirre MD, [address]

## 2019-04-12 NOTE — PATIENT INSTRUCTIONS
Complete Blood Count (CBC): About This Test 
What is it? A complete blood count (CBC) is a blood test that gives important information about your blood cells, especially red blood cells, white blood cells, and platelets. Why is this test done? A CBC may be done as part of a regular physical exam. There are many other reasons that a doctor may want this blood test, including to: · Find the cause of symptoms such as fatigue, weakness, fever, bruising, or weight loss. · Find anemia or an infection. · See how much blood has been lost if there is bleeding. · Diagnose diseases of the blood, such as leukemia or polycythemia. How can you prepare for the test? 
You do not need to do anything before having this test. 
What happens during the test? 
The health professional taking a sample of your blood will: · Wrap an elastic band around your upper arm. This makes the veins below the band larger so it is easier to put a needle into the vein. · Clean the needle site with alcohol. · Put the needle into the vein. · Attach a tube to the needle to fill it with blood. · Remove the band from your arm when enough blood is collected. · Put a gauze pad or cotton ball over the needle site as the needle is removed. · Put pressure on the site and then put on a bandage. If this blood test is done on a baby, a heel stick may be done instead of a blood draw from a vein. What happens after the test? 
· You will probably be able to go home right away. · You can go back to your usual activities right away. Follow-up care is a key part of your treatment and safety. Be sure to make and go to all appointments, and call your doctor if you are having problems. It's also a good idea to keep a list of the medicines you take. Ask your doctor when you can expect to have your test results. Where can you learn more? Go to http://cleo-monika.info/. Enter V800 in the search box to learn more about \"Complete Blood Count (CBC): About This Test.\" Current as of: June 25, 2018 Content Version: 11.9 © 4319-8527 [employer]. Care instructions adapted under license by [employer] (which disclaims liability or warranty for this information). If you have questions about a medical condition or this instruction, always ask your healthcare professional. [place] [address] any warranty or liability for your use of this information.

## 2019-04-26 ENCOUNTER — HOSPITAL ENCOUNTER (OUTPATIENT)
Dept: INFUSION THERAPY | Age: 64
Discharge: HOME | End: 2019-04-26
Payer: MEDICARE

## 2019-04-26 ENCOUNTER — HOSPITAL ENCOUNTER (OUTPATIENT)
Dept: ONCOLOGY | Age: 64
Discharge: HOME | End: 2019-04-26

## 2019-04-26 ENCOUNTER — CLINICAL SUPPORT (OUTPATIENT)
Dept: ONCOLOGY | Age: 64
End: 2019-04-26

## 2019-04-26 VITALS
DIASTOLIC BLOOD PRESSURE: 66 MMHG | TEMPERATURE: 98.4 F | OXYGEN SATURATION: 96 % | RESPIRATION RATE: 16 BRPM | HEART RATE: 72 BPM | SYSTOLIC BLOOD PRESSURE: 128 MMHG

## 2019-04-26 DIAGNOSIS — D64.9 CHRONIC ANEMIA: ICD-10-CM

## 2019-04-26 DIAGNOSIS — D64.9 CHRONIC ANEMIA: Primary | ICD-10-CM

## 2019-04-26 LAB
BASO+EOS+MONOS # BLD AUTO: 0.5 K/UL (ref 0–2.3)
BASO+EOS+MONOS NFR BLD AUTO: 10 % (ref 0.1–17)
DIFFERENTIAL METHOD BLD: ABNORMAL
ERYTHROCYTE [DISTWIDTH] IN BLOOD BY AUTOMATED COUNT: 11.9 % (ref 11.5–14.5)
HCT VFR BLD AUTO: 32.9 % (ref 36–48)
HGB BLD-MCNC: 11 G/DL (ref 12–16)
LYMPHOCYTES # BLD: 1.8 K/UL (ref 1.1–5.9)
LYMPHOCYTES NFR BLD: 35 % (ref 14–44)
MCH RBC QN AUTO: 29.6 PG (ref 25–35)
MCHC RBC AUTO-ENTMCNC: 33.4 G/DL (ref 31–37)
MCV RBC AUTO: 88.7 FL (ref 78–102)
NEUTS SEG # BLD: 3 K/UL (ref 1.8–9.5)
NEUTS SEG NFR BLD: 55 % (ref 40–70)
PLATELET # BLD AUTO: 256 K/UL (ref 140–440)
RBC # BLD AUTO: 3.71 M/UL (ref 4.1–5.1)
WBC # BLD AUTO: 5.3 K/UL (ref 4.5–13)

## 2019-04-26 PROCEDURE — 36415 COLL VENOUS BLD VENIPUNCTURE: CPT

## 2019-04-26 NOTE — PROGRESS NOTES
[facility] Progress Note Date: 2019 Name: Liz Caldwell MRN: [id number] : 1955 Mr. Pierre arrived in the [facility] today at , in stable condition, here for Q 2 Week, CBC/Retacrit injection. He was assessed and education was provided. Mr. Candido Perez vitals were reviewed. Visit Vitals /66 (BP 1 Location: Right arm, BP Patient Position: Sitting) Pulse 72 Temp 98.4 °F (36.9 °C) Resp 16 SpO2 96% Blood was drawn for a CBC, from his right AC at 1315, without incident. Lab results were obtained and reviewed. Recent Results (from the past 12 hour(s)) CBC WITH 3 PART DIFF Collection Time: 19  1:15 PM  
Result Value Ref Range WBC 5.3 4.5 - 13.0 K/uL  
 RBC 3.71 (L) 4.10 - 5.10 M/uL  
 HGB 11.0 (L) 12.0 - 16.0 g/dL HCT 32.9 (L) 36 - 48 % MCV 88.7 78 - 102 FL  
 MCH 29.6 25.0 - 35.0 PG  
 MCHC 33.4 31 - 37 g/dL  
 RDW 11.9 11.5 - 14.5 % PLATELET 830 173 - 849 K/uL NEUTROPHILS 55 40 - 70 % MIXED CELLS 10 0.1 - 17 % LYMPHOCYTES 35 14 - 44 % ABS. NEUTROPHILS 3.0 1.8 - 9.5 K/UL  
 ABS. MIXED CELLS 0.5 0.0 - 2.3 K/uL  
 ABS. LYMPHOCYTES 1.8 1.1 - 5.9 K/UL  
 DF AUTOMATED Retacrit injection was HELD today, per order. Mr. Scooter Finnegan tolerated well, and had no complaints. Mr. Scooter Finnegan was discharged from [place] [address] in stable condition at 1325. Hortensia Serna He is to return in 2 weeks, on Friday, 5-10-19,  at 1300,  for his next appointment, for CBC/Retacrit injection. Swati Ya RN 2019 
1:38 PM

## 2019-05-10 ENCOUNTER — HOSPITAL ENCOUNTER (OUTPATIENT)
Dept: ONCOLOGY | Age: 64
Discharge: HOME | End: 2019-05-10

## 2019-05-10 ENCOUNTER — CLINICAL SUPPORT (OUTPATIENT)
Dept: ONCOLOGY | Age: 64
End: 2019-05-10

## 2019-05-10 ENCOUNTER — HOSPITAL ENCOUNTER (OUTPATIENT)
Dept: INFUSION THERAPY | Age: 64
Discharge: HOME | End: 2019-05-10
Payer: MEDICARE

## 2019-05-10 VITALS
HEART RATE: 69 BPM | SYSTOLIC BLOOD PRESSURE: 111 MMHG | RESPIRATION RATE: 16 BRPM | TEMPERATURE: 97.6 F | DIASTOLIC BLOOD PRESSURE: 65 MMHG

## 2019-05-10 DIAGNOSIS — D64.9 CHRONIC ANEMIA: Primary | ICD-10-CM

## 2019-05-10 DIAGNOSIS — D64.9 CHRONIC ANEMIA: ICD-10-CM

## 2019-05-10 LAB
BASO+EOS+MONOS # BLD AUTO: 0.3 K/UL (ref 0–2.3)
BASO+EOS+MONOS NFR BLD AUTO: 8 % (ref 0.1–17)
DIFFERENTIAL METHOD BLD: ABNORMAL
ERYTHROCYTE [DISTWIDTH] IN BLOOD BY AUTOMATED COUNT: 11.6 % (ref 11.5–14.5)
HCT VFR BLD AUTO: 32.5 % (ref 36–48)
HGB BLD-MCNC: 11.1 G/DL (ref 12–16)
LYMPHOCYTES # BLD: 1.7 K/UL (ref 1.1–5.9)
LYMPHOCYTES NFR BLD: 43 % (ref 14–44)
MCH RBC QN AUTO: 29.9 PG (ref 25–35)
MCHC RBC AUTO-ENTMCNC: 34.2 G/DL (ref 31–37)
MCV RBC AUTO: 87.6 FL (ref 78–102)
NEUTS SEG # BLD: 2 K/UL (ref 1.8–9.5)
NEUTS SEG NFR BLD: 49 % (ref 40–70)
PLATELET # BLD AUTO: 249 K/UL (ref 140–440)
RBC # BLD AUTO: 3.71 M/UL (ref 4.1–5.1)
WBC # BLD AUTO: 4 K/UL (ref 4.5–13)

## 2019-05-10 PROCEDURE — 36415 COLL VENOUS BLD VENIPUNCTURE: CPT

## 2019-05-10 NOTE — PROGRESS NOTES
[facility] Progress Note Date: May 10, 2019 Name: Liz Caldwell MRN: [id number] : 1955 Mr. Scooter Finnegan was assessed and education was provided. Mr. Candido Perez vitals were reviewed and patient was observed for 5 minutes prior to treatment. Visit Vitals /65 (BP 1 Location: Left arm, BP Patient Position: At rest;Sitting) Pulse 69 Temp 97.6 °F (36.4 °C) Resp 16 Lab results were obtained and reviewed. Recent Results (from the past 12 hour(s)) CBC WITH 3 PART DIFF Collection Time: 05/10/19  1:32 PM  
Result Value Ref Range WBC 4.0 (L) 4.5 - 13.0 K/uL  
 RBC 3.71 (L) 4.10 - 5.10 M/uL  
 HGB 11.1 (L) 12.0 - 16.0 g/dL HCT 32.5 (L) 36 - 48 % MCV 87.6 78 - 102 FL  
 MCH 29.9 25.0 - 35.0 PG  
 MCHC 34.2 31 - 37 g/dL  
 RDW 11.6 11.5 - 14.5 % PLATELET 396 468 - 104 K/uL NEUTROPHILS 49 40 - 70 % MIXED CELLS 8 0.1 - 17 % LYMPHOCYTES 43 14 - 44 % ABS. NEUTROPHILS 2.0 1.8 - 9.5 K/UL  
 ABS. MIXED CELLS 0.3 0.0 - 2.3 K/uL  
 ABS. LYMPHOCYTES 1.7 1.1 - 5.9 K/UL  
 DF AUTOMATED Retacritt not given for H&H 11.1/32.5. Patient armband removed and shredded. Mr. Scooter Finnegan was discharged from [place] [address] in stable condition at [phone]. He is to return on 19 at 1300 for his next appointment for CBC/Retacrit Q 2 wks. Екатерина Aleman RN May 10, 2019

## 2019-05-23 NOTE — PROGRESS NOTES
I spoke with Mrs Olesya Mast on the phone. Mr Pierre's appt on 5/24/19 is cancelled. Mr Olesya Mast will call to reschedule.

## 2019-05-24 ENCOUNTER — HOSPITAL ENCOUNTER (OUTPATIENT)
Dept: INFUSION THERAPY | Age: 64
Discharge: HOME | End: 2019-05-24
Payer: MEDICARE

## 2019-05-24 NOTE — PROGRESS NOTES
I spoke with Mr Socorro Keith on the phone. He will keep his next regularly scheduled appt on 6/7/19 at 1300.

## 2019-06-07 ENCOUNTER — CLINICAL SUPPORT (OUTPATIENT)
Dept: ONCOLOGY | Age: 64
End: 2019-06-07

## 2019-06-07 ENCOUNTER — HOSPITAL ENCOUNTER (OUTPATIENT)
Dept: INFUSION THERAPY | Age: 64
Discharge: HOME | End: 2019-06-07
Payer: MEDICARE

## 2019-06-07 ENCOUNTER — HOSPITAL ENCOUNTER (OUTPATIENT)
Dept: ONCOLOGY | Age: 64
Discharge: HOME | End: 2019-06-07

## 2019-06-07 VITALS
OXYGEN SATURATION: 97 % | DIASTOLIC BLOOD PRESSURE: 66 MMHG | TEMPERATURE: 97.3 F | HEART RATE: 67 BPM | RESPIRATION RATE: 18 BRPM | SYSTOLIC BLOOD PRESSURE: 120 MMHG

## 2019-06-07 DIAGNOSIS — D46.9 MYELODYSPLASTIC SYNDROME (HCC): Primary | ICD-10-CM

## 2019-06-07 DIAGNOSIS — D46.9 MYELODYSPLASTIC SYNDROME (HCC): ICD-10-CM

## 2019-06-07 LAB
BASO+EOS+MONOS # BLD AUTO: 0.3 K/UL (ref 0–2.3)
BASO+EOS+MONOS NFR BLD AUTO: 8 % (ref 0.1–17)
DIFFERENTIAL METHOD BLD: ABNORMAL
ERYTHROCYTE [DISTWIDTH] IN BLOOD BY AUTOMATED COUNT: 12 % (ref 11.5–14.5)
HCT VFR BLD AUTO: 29.8 % (ref 36–48)
HGB BLD-MCNC: 10.3 G/DL (ref 12–16)
LYMPHOCYTES # BLD: 1.5 K/UL (ref 1.1–5.9)
LYMPHOCYTES NFR BLD: 38 % (ref 14–44)
MCH RBC QN AUTO: 30.4 PG (ref 25–35)
MCHC RBC AUTO-ENTMCNC: 34.6 G/DL (ref 31–37)
MCV RBC AUTO: 87.9 FL (ref 78–102)
NEUTS SEG # BLD: 2.2 K/UL (ref 1.8–9.5)
NEUTS SEG NFR BLD: 54 % (ref 40–70)
PLATELET # BLD AUTO: 240 K/UL (ref 140–440)
RBC # BLD AUTO: 3.39 M/UL (ref 4.1–5.1)
WBC # BLD AUTO: 4 K/UL (ref 4.5–13)

## 2019-06-07 PROCEDURE — 36415 COLL VENOUS BLD VENIPUNCTURE: CPT

## 2019-06-07 NOTE — PROGRESS NOTES
[facility] Progress Note    Date: 2019    Name: Thalia Hernandes    MRN: [id number]         : 1955    Retacrit    Mr. Jenn Hedrick was assessed and education was provided. Arrived to [address] [place] [address], at 1300. Mr. Caroline Champagne vitals were reviewed and patient was observed for 5 minutes prior to treatment. Visit Vitals  /66 (BP 1 Location: Left arm, BP Patient Position: Sitting)   Pulse 67   Temp 97.3 °F (36.3 °C)   Resp 18   SpO2 97%       Lab results were obtained and reviewed. Recent Results (from the past 12 hour(s))   CBC WITH 3 PART DIFF    Collection Time: 19  1:05 PM   Result Value Ref Range    WBC 4.0 (L) 4.5 - 13.0 K/uL    RBC 3.39 (L) 4.10 - 5.10 M/uL    HGB 10.3 (L) 12.0 - 16.0 g/dL    HCT 29.8 (L) 36 - 48 %    MCV 87.9 78 - 102 FL    MCH 30.4 25.0 - 35.0 PG    MCHC 34.6 31 - 37 g/dL    RDW 12.0 11.5 - 14.5 %    PLATELET 356 540 - 461 K/uL    NEUTROPHILS 54 40 - 70 %    MIXED CELLS 8 0.1 - 17 %    LYMPHOCYTES 38 14 - 44 %    ABS. NEUTROPHILS 2.2 1.8 - 9.5 K/UL    ABS. MIXED CELLS 0.3 0.0 - 2.3 K/uL    ABS. LYMPHOCYTES 1.5 1.1 - 5.9 K/UL    DF AUTOMATED         Retacrit held per parameters. Patient armband removed and shredded. Mr. Jenn Hedrick was discharged from [place] [address] in stable condition at 1315. He is to return on 19 at 1300 for his next appointment for CBC/Retacrit Q 2 wks.     Shane Hancock RN  2019  1315

## 2019-06-21 ENCOUNTER — CLINICAL SUPPORT (OUTPATIENT)
Dept: ONCOLOGY | Age: 64
End: 2019-06-21

## 2019-06-21 ENCOUNTER — HOSPITAL ENCOUNTER (OUTPATIENT)
Dept: ONCOLOGY | Age: 64
Discharge: HOME | End: 2019-06-21

## 2019-06-21 ENCOUNTER — HOSPITAL ENCOUNTER (OUTPATIENT)
Dept: INFUSION THERAPY | Age: 64
Discharge: HOME | End: 2019-06-21
Payer: MEDICARE

## 2019-06-21 VITALS
OXYGEN SATURATION: 95 % | HEART RATE: 73 BPM | TEMPERATURE: 97 F | SYSTOLIC BLOOD PRESSURE: 137 MMHG | DIASTOLIC BLOOD PRESSURE: 74 MMHG | RESPIRATION RATE: 16 BRPM

## 2019-06-21 DIAGNOSIS — D46.9 MDS (MYELODYSPLASTIC SYNDROME) (HCC): ICD-10-CM

## 2019-06-21 DIAGNOSIS — D46.9 MDS (MYELODYSPLASTIC SYNDROME) (HCC): Primary | ICD-10-CM

## 2019-06-21 LAB
BASO+EOS+MONOS # BLD AUTO: 0.4 K/UL (ref 0–2.3)
BASO+EOS+MONOS NFR BLD AUTO: 8 % (ref 0.1–17)
DIFFERENTIAL METHOD BLD: ABNORMAL
ERYTHROCYTE [DISTWIDTH] IN BLOOD BY AUTOMATED COUNT: 11.9 % (ref 11.5–14.5)
HCT VFR BLD AUTO: 31.2 % (ref 36–48)
HGB BLD-MCNC: 10.8 G/DL (ref 12–16)
LYMPHOCYTES # BLD: 2 K/UL (ref 1.1–5.9)
LYMPHOCYTES NFR BLD: 43 % (ref 14–44)
MCH RBC QN AUTO: 30 PG (ref 25–35)
MCHC RBC AUTO-ENTMCNC: 34.6 G/DL (ref 31–37)
MCV RBC AUTO: 86.7 FL (ref 78–102)
NEUTS SEG # BLD: 2.2 K/UL (ref 1.8–9.5)
NEUTS SEG NFR BLD: 49 % (ref 40–70)
PLATELET # BLD AUTO: 258 K/UL (ref 140–440)
RBC # BLD AUTO: 3.6 M/UL (ref 4.1–5.1)
WBC # BLD AUTO: 4.6 K/UL (ref 4.5–13)

## 2019-06-21 PROCEDURE — 36415 COLL VENOUS BLD VENIPUNCTURE: CPT

## 2019-06-21 NOTE — PROGRESS NOTES
[facility] Progress Note    Date: 2019    Name: Debbie Vidales    MRN: [id number]         : 1955      Mr. Pierre arrived in the [facility] today at 1300, in stable condition, here for Q 2 Week, CBC/Retacrit injection. He was assessed and education was provided. Mr. Lucrecia Valadez vitals were reviewed. Visit Vitals  /74 (BP 1 Location: Left arm, BP Patient Position: Sitting)   Pulse 73   Temp 97 °F (36.1 °C)   Resp 16   SpO2 95%           Blood was drawn for a CBC, from his left AC at 1316, without incident. Lab results were obtained and reviewed. Recent Results (from the past 12 hour(s))   CBC WITH 3 PART DIFF    Collection Time: 19  1:16 PM   Result Value Ref Range    WBC 4.6 4.5 - 13.0 K/uL    RBC 3.60 (L) 4.10 - 5.10 M/uL    HGB 10.8 (L) 12.0 - 16.0 g/dL    HCT 31.2 (L) 36 - 48 %    MCV 86.7 78 - 102 FL    MCH 30.0 25.0 - 35.0 PG    MCHC 34.6 31 - 37 g/dL    RDW 11.9 11.5 - 14.5 %    PLATELET 120 868 - 829 K/uL    NEUTROPHILS 49 40 - 70 %    MIXED CELLS 8 0.1 - 17 %    LYMPHOCYTES 43 14 - 44 %    ABS. NEUTROPHILS 2.2 1.8 - 9.5 K/UL    ABS. MIXED CELLS 0.4 0.0 - 2.3 K/uL    ABS. LYMPHOCYTES 2.0 1.1 - 5.9 K/UL    DF AUTOMATED             Retacrit injection was HELD today, per order. Mr. Sandy Smiley tolerated well, and had no complaints. Mr. Sandy Smiley was discharged from [place] [address] in stable condition at 1330. .. He is to return in 2 weeks, on Friday, 19,  at 1300,  for his next appointment, for CBC/Retacrit injection.      Fidel Rodriguez RN  2019  1:38 PM

## 2019-07-05 ENCOUNTER — HOSPITAL ENCOUNTER (OUTPATIENT)
Dept: INFUSION THERAPY | Age: 64
Discharge: HOME | End: 2019-07-05
Payer: MEDICARE

## 2019-07-05 ENCOUNTER — CLINICAL SUPPORT (OUTPATIENT)
Dept: ONCOLOGY | Age: 64
End: 2019-07-05

## 2019-07-05 ENCOUNTER — HOSPITAL ENCOUNTER (OUTPATIENT)
Dept: ONCOLOGY | Age: 64
Discharge: HOME | End: 2019-07-05

## 2019-07-05 VITALS
SYSTOLIC BLOOD PRESSURE: 129 MMHG | HEART RATE: 67 BPM | RESPIRATION RATE: 16 BRPM | OXYGEN SATURATION: 100 % | TEMPERATURE: 97.6 F | DIASTOLIC BLOOD PRESSURE: 77 MMHG

## 2019-07-05 DIAGNOSIS — D64.9 CHRONIC ANEMIA: Primary | ICD-10-CM

## 2019-07-05 DIAGNOSIS — D64.9 CHRONIC ANEMIA: ICD-10-CM

## 2019-07-05 LAB
BASO+EOS+MONOS # BLD AUTO: 0.5 K/UL (ref 0–2.3)
BASO+EOS+MONOS NFR BLD AUTO: 11 % (ref 0.1–17)
DIFFERENTIAL METHOD BLD: ABNORMAL
ERYTHROCYTE [DISTWIDTH] IN BLOOD BY AUTOMATED COUNT: 12 % (ref 11.5–14.5)
HCT VFR BLD AUTO: 30.2 % (ref 36–48)
HGB BLD-MCNC: 10.5 G/DL (ref 12–16)
LYMPHOCYTES # BLD: 2.1 K/UL (ref 1.1–5.9)
LYMPHOCYTES NFR BLD: 46 % (ref 14–44)
MCH RBC QN AUTO: 30 PG (ref 25–35)
MCHC RBC AUTO-ENTMCNC: 34.8 G/DL (ref 31–37)
MCV RBC AUTO: 86.3 FL (ref 78–102)
NEUTS SEG # BLD: 2 K/UL (ref 1.8–9.5)
NEUTS SEG NFR BLD: 43 % (ref 40–70)
PLATELET # BLD AUTO: 260 K/UL (ref 140–440)
RBC # BLD AUTO: 3.5 M/UL (ref 4.1–5.1)
WBC # BLD AUTO: 4.6 K/UL (ref 4.5–13)

## 2019-07-05 PROCEDURE — 36415 COLL VENOUS BLD VENIPUNCTURE: CPT

## 2019-07-05 NOTE — PROGRESS NOTES
[facility] Progress Note    Date: 2019    Name: Malina Rawls    MRN: [id number]         : 1955      Mr. Jackie Rodriguez was assessed and education was provided. Mr. London Harris vitals were reviewed and patient was observed for 5 minutes prior to treatment. Visit Vitals  /77 (BP 1 Location: Left arm, BP Patient Position: Sitting)   Pulse 67   Temp 97.6 °F (36.4 °C)   Resp 16   SpO2 100%       Lab results were obtained and reviewed. Recent Results (from the past 12 hour(s))   CBC WITH 3 PART DIFF    Collection Time: 19  1:18 PM   Result Value Ref Range    WBC 4.6 4.5 - 13.0 K/uL    RBC 3.50 (L) 4.10 - 5.10 M/uL    HGB 10.5 (L) 12.0 - 16.0 g/dL    HCT 30.2 (L) 36 - 48 %    MCV 86.3 78 - 102 FL    MCH 30.0 25.0 - 35.0 PG    MCHC 34.8 31 - 37 g/dL    RDW 12.0 11.5 - 14.5 %    PLATELET 669 127 - 793 K/uL    NEUTROPHILS 43 40 - 70 %    MIXED CELLS 11 0.1 - 17 %    LYMPHOCYTES 46 (H) 14 - 44 %    ABS. NEUTROPHILS 2.0 1.8 - 9.5 K/UL    ABS. MIXED CELLS 0.5 0.0 - 2.3 K/uL    ABS. LYMPHOCYTES 2.1 1.1 - 5.9 K/UL    DF AUTOMATED         Retacritt not given for H&H 10.5/30.2. Patient armband removed and shredded. Mr. Jackie Rodriguez was discharged from [place] [address] in stable condition at 1330. He is to return on  at 1300 for his next appointment for CBC/Retacrit Q 2 wks.     Austen Tadeo RN  2019

## 2019-07-19 ENCOUNTER — HOSPITAL ENCOUNTER (OUTPATIENT)
Dept: ONCOLOGY | Age: 64
Discharge: HOME | End: 2019-07-19

## 2019-07-19 ENCOUNTER — CLINICAL SUPPORT (OUTPATIENT)
Dept: ONCOLOGY | Age: 64
End: 2019-07-19

## 2019-07-19 ENCOUNTER — HOSPITAL ENCOUNTER (OUTPATIENT)
Dept: INFUSION THERAPY | Age: 64
Discharge: HOME | End: 2019-07-19
Payer: MEDICARE

## 2019-07-19 VITALS
OXYGEN SATURATION: 98 % | RESPIRATION RATE: 16 BRPM | HEART RATE: 63 BPM | DIASTOLIC BLOOD PRESSURE: 53 MMHG | SYSTOLIC BLOOD PRESSURE: 95 MMHG | TEMPERATURE: 97.2 F

## 2019-07-19 DIAGNOSIS — D46.9 MYELODYSPLASTIC SYNDROME (HCC): ICD-10-CM

## 2019-07-19 DIAGNOSIS — D46.9 MYELODYSPLASTIC SYNDROME (HCC): Primary | ICD-10-CM

## 2019-07-19 LAB
BASO+EOS+MONOS # BLD AUTO: 0.4 K/UL (ref 0–2.3)
BASO+EOS+MONOS NFR BLD AUTO: 10 % (ref 0.1–17)
DIFFERENTIAL METHOD BLD: ABNORMAL
ERYTHROCYTE [DISTWIDTH] IN BLOOD BY AUTOMATED COUNT: 12.7 % (ref 11.5–14.5)
HCT VFR BLD AUTO: 27.1 % (ref 36–48)
HGB BLD-MCNC: 9.3 G/DL (ref 12–16)
LYMPHOCYTES # BLD: 1.4 K/UL (ref 1.1–5.9)
LYMPHOCYTES NFR BLD: 36 % (ref 14–44)
MCH RBC QN AUTO: 30.2 PG (ref 25–35)
MCHC RBC AUTO-ENTMCNC: 34.3 G/DL (ref 31–37)
MCV RBC AUTO: 88 FL (ref 78–102)
NEUTS SEG # BLD: 2 K/UL (ref 1.8–9.5)
NEUTS SEG NFR BLD: 54 % (ref 40–70)
PLATELET # BLD AUTO: 228 K/UL (ref 140–440)
RBC # BLD AUTO: 3.08 M/UL (ref 4.1–5.1)
WBC # BLD AUTO: 3.8 K/UL (ref 4.5–13)

## 2019-07-19 PROCEDURE — 36415 COLL VENOUS BLD VENIPUNCTURE: CPT

## 2019-07-19 PROCEDURE — 96372 THER/PROPH/DIAG INJ SC/IM: CPT

## 2019-07-19 PROCEDURE — 74011250636 HC RX REV CODE- 250/636: Performed by: INTERNAL MEDICINE

## 2019-07-19 RX ADMIN — EPOETIN ALFA-EPBX 60000 UNITS: 40000 INJECTION, SOLUTION INTRAVENOUS; SUBCUTANEOUS at 13:50

## 2019-07-19 NOTE — PROGRESS NOTES
[facility] Progress Note    Date: 2019    Name: Emma Vicente    MRN: [id number]         : 1955      Mr. Pierre arrived in the [facility] today at 1315, in stable condition, here for Q 2 Week, CBC/Retacrit injection. He was assessed and education was provided. Mr. Harshil Stanton vitals were reviewed. Visit Vitals  BP 95/53 (BP 1 Location: Left arm, BP Patient Position: Sitting)   Pulse 63   Temp 97.2 °F (36.2 °C)   Resp 16   SpO2 98%           Blood was drawn for a CBC, from his left AC at 1323, without incident. Lab results were obtained and reviewed. Recent Results (from the past 12 hour(s))   CBC WITH 3 PART DIFF    Collection Time: 19  1:23 PM   Result Value Ref Range    WBC 3.8 (L) 4.5 - 13.0 K/uL    RBC 3.08 (L) 4.10 - 5.10 M/uL    HGB 9.3 (L) 12.0 - 16.0 g/dL    HCT 27.1 (L) 36 - 48 %    MCV 88.0 78 - 102 FL    MCH 30.2 25.0 - 35.0 PG    MCHC 34.3 31 - 37 g/dL    RDW 12.7 11.5 - 14.5 %    PLATELET 404 130 - 822 K/uL    NEUTROPHILS 54 40 - 70 %    MIXED CELLS 10 0.1 - 17 %    LYMPHOCYTES 36 14 - 44 %    ABS. NEUTROPHILS 2.0 1.8 - 9.5 K/UL    ABS. MIXED CELLS 0.4 0.0 - 2.3 K/uL    ABS. LYMPHOCYTES 1.4 1.1 - 5.9 K/UL    DF AUTOMATED             Retacrit (epoetin-suhas-epbx) 60,000 units, was administered SQ, in his left arm at 1350, per order, and without incident. (The Retacrit was given in 2 injections--40,000 units in the 1st injection & 20,000 units in the 2nd injection.)              Mr. Sarkar tolerated well, and had no complaints. Mr. Sarkar was discharged from [place] [address] in stable condition at 1400. Robbie Noel He is to return, on Friday, 8-30-19,  at 1300,  for his next appointment, for CBC/Retacrit injection.  (Please note that he will not return to the [facility] until this date, because he will be out of town until then.)    Dania Cooney RN  2019  1:38 PM

## 2019-07-26 ENCOUNTER — HOSPITAL ENCOUNTER (OUTPATIENT)
Dept: ONCOLOGY | Age: 64
Discharge: HOME | End: 2019-07-26

## 2019-07-26 ENCOUNTER — OFFICE VISIT (OUTPATIENT)
Dept: ONCOLOGY | Age: 64
End: 2019-07-26

## 2019-07-26 VITALS
RESPIRATION RATE: 12 BRPM | BODY MASS INDEX: 28.64 KG/M2 | HEIGHT: 69 IN | DIASTOLIC BLOOD PRESSURE: 63 MMHG | WEIGHT: 193.4 LBS | HEART RATE: 61 BPM | TEMPERATURE: 97.6 F | OXYGEN SATURATION: 96 % | SYSTOLIC BLOOD PRESSURE: 123 MMHG

## 2019-07-26 DIAGNOSIS — D64.9 CHRONIC ANEMIA: Primary | ICD-10-CM

## 2019-07-26 DIAGNOSIS — D64.9 CHRONIC ANEMIA: ICD-10-CM

## 2019-07-26 DIAGNOSIS — D46.9 MDS (MYELODYSPLASTIC SYNDROME) (HCC): ICD-10-CM

## 2019-07-26 LAB
BASO+EOS+MONOS # BLD AUTO: 0.5 K/UL (ref 0–2.3)
BASO+EOS+MONOS NFR BLD AUTO: 9 % (ref 0.1–17)
DIFFERENTIAL METHOD BLD: ABNORMAL
ERYTHROCYTE [DISTWIDTH] IN BLOOD BY AUTOMATED COUNT: 13.9 % (ref 11.5–14.5)
HCT VFR BLD AUTO: 31.3 % (ref 36–48)
HGB BLD-MCNC: 10.6 G/DL (ref 12–16)
LYMPHOCYTES # BLD: 1.8 K/UL (ref 1.1–5.9)
LYMPHOCYTES NFR BLD: 35 % (ref 14–44)
MCH RBC QN AUTO: 30.7 PG (ref 25–35)
MCHC RBC AUTO-ENTMCNC: 33.9 G/DL (ref 31–37)
MCV RBC AUTO: 90.7 FL (ref 78–102)
NEUTS SEG # BLD: 3 K/UL (ref 1.8–9.5)
NEUTS SEG NFR BLD: 57 % (ref 40–70)
PLATELET # BLD AUTO: 280 K/UL (ref 140–440)
RBC # BLD AUTO: 3.45 M/UL (ref 4.1–5.1)
WBC # BLD AUTO: 5.3 K/UL (ref 4.5–13)

## 2019-07-26 NOTE — PROGRESS NOTES
Hematology/Oncology  Progress Note    Name: Filemon Crowder  Date: 2019  : 1955  PCP: Lennox Marquez MD     Mr. Li Rehman is a 59 y.o. man with a history of early myelodysplastic syndrome and chronic anemia. Current therapy: Retacrit whenever his hemoglobin is below 10g/dL and hematocrit below 30%. Subjective:     Mr. Li Rehman is a 68-year-old man with early myelodysplastic syndrome chronic anemia. He reports he started having this problem in 2018. Today he is here in our clinic for his follow up office visit. He states he is been feeling well since last visit. He denies fatigue, tiredness, and shortness of breath. He denies chest pain and dizziness. He also denies pain or any discomfort. However he admits he is not taking his iron as prescribed. He states he only takes 1 tablet daily and he was told to take 1 tablet twice a day. He states he will be more compliant this time. He does not have any concerns or complaints to report at this time. Past medical history, family history, and social history: these were reviewed and remains unchanged.     Past Medical History:   Diagnosis Date    Chronic kidney disease     Diabetes mellitus     Essential hypertension     Kidney disease     Prostate cancer ([facility] [address]      Past Surgical History:   Procedure Laterality Date    HX CERVICAL FUSION      HX OTHER SURGICAL      Prostate Sx r/t cancer (seed implant)    HX OTHER SURGICAL      shoulder surgery     Social History     Socioeconomic History    Marital status:      Spouse name: Not on file    Number of children: Not on file    Years of education: Not on file    Highest education level: Not on file   Occupational History    Not on file   Social Needs    Financial resource strain: Not on file    Food insecurity:     Worry: Not on file     Inability: Not on file    Transportation needs:     Medical: Not on file     Non-medical: Not on file   Tobacco Use    Smoking status: Former Smoker     Packs/day: 0.25    Smokeless tobacco: Never Used    Tobacco comment: Patient quit in 2011, and started back. Light Smoker now   Substance and Sexual Activity    Alcohol use: No     Alcohol/week: 0.0 standard drinks    Drug use: No    Sexual activity: Not on file   Lifestyle    Physical activity:     Days per week: Not on file     Minutes per session: Not on file    Stress: Not on file   Relationships    Social connections:     Talks on phone: Not on file     Gets together: Not on file     Attends Confucianism service: Not on file     Active member of club or organization: Not on file     Attends meetings of clubs or organizations: Not on file     Relationship status: Not on file    Intimate partner violence:     Fear of current or ex partner: Not on file     Emotionally abused: Not on file     Physically abused: Not on file     Forced sexual activity: Not on file   Other Topics Concern    Not on file   Social History Narrative    Not on file     Family History   Problem Relation Age of Onset    Diabetes Father     No Known Problems Brother     Other Brother         PE     Current Outpatient Medications   Medication Sig Dispense Refill    insulin aspart protamine/insulin aspart (NOVOLOG MIX 70-30FLEXPEN U-100) 100 unit/mL (70-30) inpn 12 Units by SubCUTAneous route Before breakfast, lunch, and dinner.  zolpidem (AMBIEN) 5 mg tablet Take  by mouth nightly as needed for Sleep.  rosuvastatin (CRESTOR) 40 mg tablet Take 40 mg by mouth nightly.  hydroCHLOROthiazide 25 mg tab 25 mg, lisinopril 20 mg tab 20 mg Take  by mouth daily. HCTZ 12.5 mg / Lisinopril 20 mg, PO Daily      venlafaxine (EFFEXOR) 75 mg tablet Take 75 mg by mouth daily.  multivitamin (ONE A DAY) tablet Take 1 Tab by mouth daily.  LORazepam (ATIVAN) 1 mg tablet Take 1 mg by mouth every twelve (12) hours as needed for Anxiety.  aspirin 81 mg chewable tablet Take 81 mg by mouth daily.       cyclobenzaprine (FLEXERIL) 10 mg tablet Take 1 Tab by mouth three (3) times daily as needed for Muscle Spasm(s). 60 Tab 0    donepezil (ARICEPT) 10 mg tablet Take 10 mg by mouth nightly.  LUNESTA 3 mg tablet Take 3 mg by mouth nightly.  ARIPiprazole (ABILIFY) 10 mg tablet Take 10 mg by mouth daily.  acetaminophen (TYLENOL) 500 mg tablet Take 500 mg by mouth every six (6) hours as needed for Pain.  metFORMIN (GLUCOPHAGE) 1,000 mg tablet Take 1,000 mg by mouth two (2) times daily (with meals).  diclofenac EC (VOLTAREN) 75 mg EC tablet Take 1 Tab by mouth two (2) times daily (with meals). 60 Tab 2       Review of Systems  Constitutional: The patient has no acute distress or discomfort. HEENT: The patient denies recent head trauma, eye pain, blurred vision,  hearing deficit, oropharyngeal mucosal pain or lesions, and the patient denies throat pain or discomfort. Lymphatics: The patient denies palpable peripheral lymphadenopathy. Hematologic: The patient denies having bruising, bleeding, or progressive fatigue. Respiratory: Patient denies having shortness of breath, cough, sputum production, fever, or dyspnea on exertion. Cardiovascular: The patient denies having leg pain, leg swelling, heart palpitations, chest permit, chest pain, or lightheadedness. The patient denies having dyspnea on exertion. Gastrointestinal: The patient denies having nausea, emesis, or diarrhea. The patient denies having any hematemesis or blood in the stool. Genitourinary: Patient denies having urinary urgency, frequency, or dysuria. The patient denies having blood in the urine. Psychological: The patient denies having symptoms of nervousness, anxiety, depression, or thoughts of harming self. Skin: Patient denies having skin rashes, skin, ulcerations, or unexplained itching or pruritus. Musculoskeletal: The patient denies having pain in the joints or bones.       Objective:     Visit Vitals  /63   Pulse 61   Temp 97.6 °F (36.4 °C) (Oral)   Resp 12   Ht 5' 9\" (1.753 m)   Wt 87.7 kg (193 lb 6.4 oz)   SpO2 96%   BMI 28.56 kg/m²     ECOG PS=0    Physical Exam:   Gen. Appearance: The patient is in no acute distress. Skin: There is no bruise or rash. HEENT: The exam is unremarkable. Neck: Supple without lymphadenopathy or thyromegaly. Lungs: Clear to auscultation and percussion; there are no wheezes or rhonchi. Heart: Regular rate and rhythm; there are no murmurs, gallops, or rubs. Abdomen: Bowel sounds are present and normal.  There is no guarding, tenderness, or hepatosplenomegaly. Extremities: There is no clubbing, cyanosis, or edema. Neurologic: There are no focal neurologic deficits. Lymphatics: There is no palpable peripheral lymphadenopathy. Musculoskeletal: The patient has full range of motion at all joints. There is no evidence of joint deformity or effusions. There is no focal joint tenderness. Psychological/psychiatric: There is no clinical evidence of anxiety, depression, or melancholy. Lab data:      Results for orders placed or performed during the hospital encounter of 07/26/19   CBC WITH 3 PART DIFF     Status: Abnormal   Result Value Ref Range Status    WBC 5.3 4.5 - 13.0 K/uL Final    RBC 3.45 (L) 4.10 - 5.10 M/uL Final    HGB 10.6 (L) 12.0 - 16.0 g/dL Final    HCT 31.3 (L) 36 - 48 % Final    MCV 90.7 78 - 102 FL Final    MCH 30.7 25.0 - 35.0 PG Final    MCHC 33.9 31 - 37 g/dL Final    RDW 13.9 11.5 - 14.5 % Final    PLATELET 761 961 - 454 K/uL Final    NEUTROPHILS 57 40 - 70 % Final    MIXED CELLS 9 0.1 - 17 % Final    LYMPHOCYTES 35 14 - 44 % Final    ABS. NEUTROPHILS 3.0 1.8 - 9.5 K/UL Final    ABS. MIXED CELLS 0.5 0.0 - 2.3 K/uL Final    ABS. LYMPHOCYTES 1.8 1.1 - 5.9 K/UL Final     Comment: Test performed at [address] [place] [address] or Outpatient Infusion Center Location. Reviewed by Medical Director. DF AUTOMATED   Final           Assessment:     1. Chronic anemia    2. MDS (myelodysplastic syndrome) ([facility])      Plan:   Chronic anemia/iron deficiency anemia secondary to inadequate iron intake: I have explained to the patient that today his CBC shows his hemoglobin is 10.6g/dL with hematocrit of 31.3%. On 04/12/19 his ferritin level was 83NG/ML, Iron at 63 with Iron % saturation of 19%. He was advised to take his iron pill  twice daily. Retacrit will be provided whenever his hemoglobin declines below 10g/dL and hematocrit below 30%. Iron Profile and ferritin level will be obtained at this time. Early myelodysplastic syndrome: I have explained to the patient that today his CBC shows his WBC is 5.3K/uL hemoglobin is 1069g/dL with hematocrit of 31.3% and his platelet count is normal at 280,000. On 04/12/19 his ferritin level was 83, iron at 63 with Iron % saturation of 19%. He was advised to take his iron pill twice daily. Retacrit will be provided whenever his hemoglobin declines below 10g/dL and hematocrit below 30%. Iron Profile and ferritin level will be obtained along with CMP. Follow-up in 3 months or sooner if indicated. Orders Placed This Encounter    COMPLETE CBC & AUTO DIFF WBC    InHouse CBC ([facility])     Standing Status:   Future     Number of Occurrences:   1     Standing Expiration Date:   8/2/2019    IRON PROFILE     Standing Status:   Future     Standing Expiration Date:   8/68/4944    METABOLIC PANEL, COMPREHENSIVE     Standing Status:   Future     Standing Expiration Date:   7/26/2020    FERRITIN     Standing Status:   Future     Standing Expiration Date:   7/26/2020         Edgar May NP  07/26/2019    I have assessed the patient independently and  agree with the full assessment as outlined.   Tori Pardo MD, [address]

## 2019-07-26 NOTE — PROGRESS NOTES
ROOM # 7    Karl Dominguez presents today for   Chief Complaint   Patient presents with    Anemia       Karl Dominguez preferred language for health care discussion is english/other. Is someone accompanying this pt? no    Is the patient using any DME equipment during OV? no    Depression Screening:  3 most recent [facility] 36 Screens 7/26/2019 4/12/2019 1/11/2019 11/16/2018 10/19/2018 6/22/2018 6/5/2018   PHQ Not Done Active Diagnosis of Depression or Bipolar Disorder - - - - - -   Little interest or pleasure in doing things - Not at all Not at all Not at all Not at all Not at all Not at all   Feeling down, depressed, irritable, or hopeless - Not at all Not at all Not at all Not at all Not at all Not at all   Total Score PHQ 2 - 0 0 0 0 0 0       Learning Assessment:  Learning Assessment 12/16/2015   PRIMARY LEARNER Patient   PRIMARY LANGUAGE ENGLISH   LEARNER PREFERENCE PRIMARY DEMONSTRATION   ANSWERED BY patient   RELATIONSHIP SELF       Abuse Screening:  Abuse Screening Questionnaire 12/16/2015   Do you ever feel afraid of your partner? N   Are you in a relationship with someone who physically or mentally threatens you? N   Is it safe for you to go home? Y       Fall Risk  Fall Risk Assessment, last 12 mths 12/16/2015   Able to walk? Yes       Health Maintenance reviewed and discussed per provider. Yes    Karl Dominguez is due for   Health Maintenance Due   Topic Date Due    Hepatitis C Screening  1955    DTaP/Tdap/Td series (1 - Tdap) 07/12/1976    Shingrix Vaccine Age 50> (1 of 2) 07/12/2005    FOBT Q 1 YEAR AGE 50-75  07/12/2005    MEDICARE YEARLY EXAM  03/14/2018         Please order/place referral if appropriate. Advance Directive:  1. Do you have an advance directive in place? Patient Reply: no    2. If not, would you like material regarding how to put one in place? Patient Reply: no    Coordination of Care:  1. Have you been to the ER, urgent care clinic since your last visit?   Hospitalized since your last visit? no    2. Have you seen or consulted any other health care providers outside of the [address] [place] [address] since your last visit? Include any pap smears or colon screening.  no

## 2019-07-26 NOTE — PATIENT INSTRUCTIONS
Anemia: Care Instructions  Your Care Instructions    Anemia is a low level of red blood cells, which carry oxygen throughout your body. Many things can cause anemia. Lack of iron is one of the most common causes. Your body needs iron to make hemoglobin, a substance in red blood cells that carries oxygen from the lungs to your body's cells. Without enough iron, the body produces fewer and smaller red blood cells. As a result, your body's cells do not get enough oxygen, and you feel tired and weak. And you may have trouble concentrating. Bleeding is the most common cause of a lack of iron. You may have heavy menstrual bleeding or bleeding caused by conditions such as ulcers, hemorrhoids, or cancer. Regular use of aspirin or other anti-inflammatory medicines (such as ibuprofen) also can cause bleeding in some people. A lack of iron in your diet also can cause anemia, especially at times when the body needs more iron, such as during pregnancy, infancy, and the teen years. Your doctor may have prescribed iron pills. It may take several months of treatment for your iron levels to return to normal. Your doctor also may suggest that you eat foods that are rich in iron, such as meat and beans. There are many other causes of anemia. It is not always due to a lack of iron. Finding the specific cause of your anemia will help your doctor find the right treatment for you. Follow-up care is a key part of your treatment and safety. Be sure to make and go to all appointments, and call your doctor if you are having problems. It's also a good idea to know your test results and keep a list of the medicines you take. How can you care for yourself at home? · Take your medicines exactly as prescribed. Call your doctor if you think you are having a problem with your medicine. · If your doctor recommends iron pills, take them as directed:  ? Try to take the pills on an empty stomach about 1 hour before or 2 hours after meals. But you may need to take iron with food to avoid an upset stomach. ? Do not take antacids or drink milk or caffeine drinks (such as coffee, tea, or cola) at the same time or within 2 hours of the time that you take your iron. They can make it hard for your body to absorb the iron. ? Vitamin C (from food or supplements) helps your body absorb iron. Try taking iron pills with a glass of orange juice or some other food that is high in vitamin C, such as citrus fruits. ? Iron pills may cause stomach problems, such as heartburn, nausea, diarrhea, constipation, and cramps. Be sure to drink plenty of fluids, and include fruits, vegetables, and fiber in your diet each day. Iron pills often make your bowel movements dark or green. ? If you forget to take an iron pill, do not take a double dose of iron the next time you take a pill. ? Keep iron pills out of the reach of small children. An overdose of iron can be very dangerous. · Follow your doctor's advice about eating iron-rich foods. These include red meat, shellfish, poultry, eggs, beans, raisins, whole-grain bread, and leafy green vegetables. · Steam vegetables to help them keep their iron content. When should you call for help? Call 911 anytime you think you may need emergency care. For example, call if:    · You have symptoms of a heart attack. These may include:  ? Chest pain or pressure, or a strange feeling in the chest.  ? Sweating. ? Shortness of breath. ? Nausea or vomiting. ? Pain, pressure, or a strange feeling in the back, neck, jaw, or upper belly or in one or both shoulders or arms. ? Lightheadedness or sudden weakness. ? A fast or irregular heartbeat. After you call 911, the  may tell you to chew 1 adult-strength or 2 to 4 low-dose aspirin. Wait for an ambulance.  Do not try to drive yourself.     · You passed out (lost consciousness).    Call your doctor now or seek immediate medical care if:    · You have new or increased shortness of breath.     · You are dizzy or lightheaded, or you feel like you may faint.     · Your fatigue and weakness continue or get worse.     · You have any abnormal bleeding, such as:  ? Nosebleeds. ? Vaginal bleeding that is different (heavier, more frequent, at a different time of the month) than what you are used to.  ? Bloody or black stools, or rectal bleeding. ? Bloody or pink urine.    Watch closely for changes in your health, and be sure to contact your doctor if:    · You do not get better as expected. Where can you learn more? Go to http://cleo-monika.info/. Enter R301 in the search box to learn more about \"Anemia: Care Instructions. \"  Current as of: March 28, 2019  Content Version: 12.1  © 6559-9790 [employer]. Care instructions adapted under license by [employer] (which disclaims liability or warranty for this information). If you have questions about a medical condition or this instruction, always ask your healthcare professional. [address] any warranty or liability for your use of this information.

## 2019-07-29 LAB
ALBUMIN SERPL-MCNC: 4.4 G/DL (ref 3.6–4.8)
ALBUMIN/GLOB SERPL: 1.7 {RATIO} (ref 1.2–2.2)
ALP SERPL-CCNC: 87 IU/L (ref 39–117)
ALT SERPL-CCNC: 17 IU/L (ref 0–44)
AST SERPL-CCNC: 26 IU/L (ref 0–40)
BILIRUB SERPL-MCNC: 0.2 MG/DL (ref 0–1.2)
BUN SERPL-MCNC: 24 MG/DL (ref 8–27)
BUN/CREAT SERPL: 20 (ref 10–24)
CALCIUM SERPL-MCNC: 9.5 MG/DL (ref 8.6–10.2)
CHLORIDE SERPL-SCNC: 98 MMOL/L (ref 96–106)
CO2 SERPL-SCNC: 20 MMOL/L (ref 20–29)
CREAT SERPL-MCNC: 1.2 MG/DL (ref 0.76–1.27)
FERRITIN SERPL-MCNC: 67 NG/ML (ref 30–400)
GLOBULIN SER CALC-MCNC: 2.6 G/DL (ref 1.5–4.5)
GLUCOSE SERPL-MCNC: 326 MG/DL (ref 65–99)
IRON SATN MFR SERPL: 31 % (ref 15–55)
IRON SERPL-MCNC: 110 UG/DL (ref 38–169)
POTASSIUM SERPL-SCNC: 5 MMOL/L (ref 3.5–5.2)
PROT SERPL-MCNC: 7 G/DL (ref 6–8.5)
SODIUM SERPL-SCNC: 134 MMOL/L (ref 134–144)
TIBC SERPL-MCNC: 357 UG/DL (ref 250–450)
UIBC SERPL-MCNC: 247 UG/DL (ref 111–343)

## 2019-08-02 ENCOUNTER — APPOINTMENT (OUTPATIENT)
Dept: INFUSION THERAPY | Age: 64
End: 2019-08-02
Payer: MEDICARE

## 2019-08-16 ENCOUNTER — APPOINTMENT (OUTPATIENT)
Dept: INFUSION THERAPY | Age: 64
End: 2019-08-16
Payer: MEDICARE

## 2019-08-30 ENCOUNTER — HOSPITAL ENCOUNTER (OUTPATIENT)
Dept: INFUSION THERAPY | Age: 64
Discharge: HOME | End: 2019-08-30
Payer: MEDICARE

## 2019-08-30 ENCOUNTER — CLINICAL SUPPORT (OUTPATIENT)
Dept: ONCOLOGY | Age: 64
End: 2019-08-30

## 2019-08-30 ENCOUNTER — HOSPITAL ENCOUNTER (OUTPATIENT)
Dept: ONCOLOGY | Age: 64
Discharge: HOME | End: 2019-08-30

## 2019-08-30 VITALS
TEMPERATURE: 97 F | RESPIRATION RATE: 16 BRPM | SYSTOLIC BLOOD PRESSURE: 121 MMHG | HEART RATE: 63 BPM | DIASTOLIC BLOOD PRESSURE: 63 MMHG | OXYGEN SATURATION: 97 %

## 2019-08-30 DIAGNOSIS — D46.9 MDS (MYELODYSPLASTIC SYNDROME) (HCC): Primary | ICD-10-CM

## 2019-08-30 DIAGNOSIS — D46.9 MDS (MYELODYSPLASTIC SYNDROME) (HCC): ICD-10-CM

## 2019-08-30 LAB
BASO+EOS+MONOS # BLD AUTO: 0.3 K/UL (ref 0–2.3)
BASO+EOS+MONOS NFR BLD AUTO: 8 % (ref 0.1–17)
DIFFERENTIAL METHOD BLD: ABNORMAL
ERYTHROCYTE [DISTWIDTH] IN BLOOD BY AUTOMATED COUNT: 13 % (ref 11.5–14.5)
HCT VFR BLD AUTO: 32.9 % (ref 36–48)
HGB BLD-MCNC: 11.1 G/DL (ref 12–16)
LYMPHOCYTES # BLD: 1.5 K/UL (ref 1.1–5.9)
LYMPHOCYTES NFR BLD: 35 % (ref 14–44)
MCH RBC QN AUTO: 30 PG (ref 25–35)
MCHC RBC AUTO-ENTMCNC: 33.7 G/DL (ref 31–37)
MCV RBC AUTO: 88.9 FL (ref 78–102)
NEUTS SEG # BLD: 2.5 K/UL (ref 1.8–9.5)
NEUTS SEG NFR BLD: 58 % (ref 40–70)
PLATELET # BLD AUTO: 249 K/UL (ref 140–440)
RBC # BLD AUTO: 3.7 M/UL (ref 4.1–5.1)
WBC # BLD AUTO: 4.3 K/UL (ref 4.5–13)

## 2019-08-30 PROCEDURE — 36415 COLL VENOUS BLD VENIPUNCTURE: CPT

## 2019-08-30 NOTE — PROGRESS NOTES
[facility] Progress Note    Date: 2019    Name: Jocelyn Valentino    MRN: [id number]         : 1955      Mr. Pierre arrived in the [facility] today at 1300, in stable condition, here for Q 2 Week, CBC/Retacrit injection. He was assessed and education was provided. Mr. Forte Solid vitals were reviewed. Visit Vitals  /63 (BP 1 Location: Right arm, BP Patient Position: Sitting)   Pulse 63   Temp 97 °F (36.1 °C)   Resp 16   SpO2 97%           Blood was drawn for a CBC, from his left AC at 1306, without incident. Lab results were obtained and reviewed. Recent Results (from the past 12 hour(s))   CBC WITH 3 PART DIFF    Collection Time: 19  1:06 PM   Result Value Ref Range    WBC 4.3 (L) 4.5 - 13.0 K/uL    RBC 3.70 (L) 4.10 - 5.10 M/uL    HGB 11.1 (L) 12.0 - 16.0 g/dL    HCT 32.9 (L) 36 - 48 %    MCV 88.9 78 - 102 FL    MCH 30.0 25.0 - 35.0 PG    MCHC 33.7 31 - 37 g/dL    RDW 13.0 11.5 - 14.5 %    PLATELET 614 933 - 186 K/uL    NEUTROPHILS 58 40 - 70 %    MIXED CELLS 8 0.1 - 17 %    LYMPHOCYTES 35 14 - 44 %    ABS. NEUTROPHILS 2.5 1.8 - 9.5 K/UL    ABS. MIXED CELLS 0.3 0.0 - 2.3 K/uL    ABS. LYMPHOCYTES 1.5 1.1 - 5.9 K/UL    DF AUTOMATED               Retacrit (epoetin-suhas-epbx) injection was HELD today, per order. Mr. Keshia Zarate tolerated well, and had no complaints. Mr. Keshia Zarate was discharged from [place] [address] in stable condition at 1320. He is to return, on Friday, 19,  at 1300,  for his next appointment, for CBC/Retacrit injection.      Rasta Andujar RN  2019  1:38 PM

## 2019-09-13 ENCOUNTER — HOSPITAL ENCOUNTER (OUTPATIENT)
Dept: ONCOLOGY | Age: 64
Discharge: HOME | End: 2019-09-13

## 2019-09-13 ENCOUNTER — CLINICAL SUPPORT (OUTPATIENT)
Dept: ONCOLOGY | Age: 64
End: 2019-09-13

## 2019-09-13 ENCOUNTER — HOSPITAL ENCOUNTER (OUTPATIENT)
Dept: INFUSION THERAPY | Age: 64
Discharge: HOME | End: 2019-09-13
Payer: MEDICARE

## 2019-09-13 VITALS
RESPIRATION RATE: 16 BRPM | DIASTOLIC BLOOD PRESSURE: 70 MMHG | HEART RATE: 61 BPM | OXYGEN SATURATION: 97 % | TEMPERATURE: 98.4 F | SYSTOLIC BLOOD PRESSURE: 138 MMHG

## 2019-09-13 DIAGNOSIS — D46.9 MDS (MYELODYSPLASTIC SYNDROME) (HCC): Primary | ICD-10-CM

## 2019-09-13 DIAGNOSIS — D46.9 MDS (MYELODYSPLASTIC SYNDROME) (HCC): ICD-10-CM

## 2019-09-13 LAB
BASO+EOS+MONOS # BLD AUTO: 0.3 K/UL (ref 0–2.3)
BASO+EOS+MONOS NFR BLD AUTO: 9 % (ref 0.1–17)
DIFFERENTIAL METHOD BLD: ABNORMAL
ERYTHROCYTE [DISTWIDTH] IN BLOOD BY AUTOMATED COUNT: 12.8 % (ref 11.5–14.5)
HCT VFR BLD AUTO: 30 % (ref 36–48)
HGB BLD-MCNC: 10.2 G/DL (ref 12–16)
LYMPHOCYTES # BLD: 1.2 K/UL (ref 1.1–5.9)
LYMPHOCYTES NFR BLD: 31 % (ref 14–44)
MCH RBC QN AUTO: 30.7 PG (ref 25–35)
MCHC RBC AUTO-ENTMCNC: 34 G/DL (ref 31–37)
MCV RBC AUTO: 90.4 FL (ref 78–102)
NEUTS SEG # BLD: 2.3 K/UL (ref 1.8–9.5)
NEUTS SEG NFR BLD: 60 % (ref 40–70)
PLATELET # BLD AUTO: 227 K/UL (ref 140–440)
RBC # BLD AUTO: 3.32 M/UL (ref 4.1–5.1)
WBC # BLD AUTO: 3.8 K/UL (ref 4.5–13)

## 2019-09-13 PROCEDURE — 36415 COLL VENOUS BLD VENIPUNCTURE: CPT

## 2019-09-13 NOTE — PROGRESS NOTES
[facility] Progress Note    Date: 2019    Name: Sj Bailon    MRN: [id number]         : 1955      Mr. Pierre arrived in the [facility] today at , in stable condition, here for Q 2 Week, CBC/Retacrit injection. He was assessed and education was provided. Mr. Jonathan Parker vitals were reviewed. Visit Vitals  /70 (BP 1 Location: Right arm, BP Patient Position: Sitting)   Pulse 61   Temp 98.4 °F (36.9 °C)   Resp 16   SpO2 97%           Blood was drawn for a CBC, from his left AC at 1322, without incident. Lab results were obtained and reviewed. Recent Results (from the past 12 hour(s))   CBC WITH 3 PART DIFF    Collection Time: 19  1:22 PM   Result Value Ref Range    WBC 3.8 (L) 4.5 - 13.0 K/uL    RBC 3.32 (L) 4.10 - 5.10 M/uL    HGB 10.2 (L) 12.0 - 16.0 g/dL    HCT 30.0 (L) 36 - 48 %    MCV 90.4 78 - 102 FL    MCH 30.7 25.0 - 35.0 PG    MCHC 34.0 31 - 37 g/dL    RDW 12.8 11.5 - 14.5 %    PLATELET 687 811 - 156 K/uL    NEUTROPHILS 60 40 - 70 %    MIXED CELLS 9 0.1 - 17 %    LYMPHOCYTES 31 14 - 44 %    ABS. NEUTROPHILS 2.3 1.8 - 9.5 K/UL    ABS. MIXED CELLS 0.3 0.0 - 2.3 K/uL    ABS. LYMPHOCYTES 1.2 1.1 - 5.9 K/UL    DF AUTOMATED                 Retacrit (epoetin-suhas-epbx) injection was HELD today, per order. Mr. Laine Carlson tolerated well, and had no complaints. Mr. Laine Carlson was discharged from [place] [address] in stable condition at 1330. He is to return in 2 weeks, on Friday, 19,  at 1100,  for his next appointment, for CBC/Retacrit injection.      Elba Perdomo RN  2019  1:38 PM

## 2019-09-27 ENCOUNTER — HOSPITAL ENCOUNTER (OUTPATIENT)
Dept: INFUSION THERAPY | Age: 64
Discharge: HOME | End: 2019-09-27
Payer: MEDICARE

## 2019-09-27 ENCOUNTER — HOSPITAL ENCOUNTER (OUTPATIENT)
Dept: ONCOLOGY | Age: 64
Discharge: HOME | End: 2019-09-27

## 2019-09-27 ENCOUNTER — CLINICAL SUPPORT (OUTPATIENT)
Dept: ONCOLOGY | Age: 64
End: 2019-09-27

## 2019-09-27 VITALS
TEMPERATURE: 96.9 F | HEART RATE: 63 BPM | RESPIRATION RATE: 18 BRPM | OXYGEN SATURATION: 98 % | SYSTOLIC BLOOD PRESSURE: 120 MMHG | DIASTOLIC BLOOD PRESSURE: 62 MMHG

## 2019-09-27 DIAGNOSIS — D46.9 MYELODYSPLASTIC SYNDROME (HCC): Primary | ICD-10-CM

## 2019-09-27 DIAGNOSIS — D46.9 MYELODYSPLASTIC SYNDROME (HCC): ICD-10-CM

## 2019-09-27 LAB
BASO+EOS+MONOS # BLD AUTO: 0.4 K/UL (ref 0–2.3)
BASO+EOS+MONOS NFR BLD AUTO: 11 % (ref 0.1–17)
DIFFERENTIAL METHOD BLD: ABNORMAL
ERYTHROCYTE [DISTWIDTH] IN BLOOD BY AUTOMATED COUNT: 12.7 % (ref 11.5–14.5)
HCT VFR BLD AUTO: 35.1 % (ref 36–48)
HGB BLD-MCNC: 11.6 G/DL (ref 12–16)
LYMPHOCYTES # BLD: 1.4 K/UL (ref 1.1–5.9)
LYMPHOCYTES NFR BLD: 41 % (ref 14–44)
MCH RBC QN AUTO: 30.1 PG (ref 25–35)
MCHC RBC AUTO-ENTMCNC: 33 G/DL (ref 31–37)
MCV RBC AUTO: 91.2 FL (ref 78–102)
NEUTS SEG # BLD: 1.6 K/UL (ref 1.8–9.5)
NEUTS SEG NFR BLD: 49 % (ref 40–70)
PLATELET # BLD AUTO: 253 K/UL (ref 140–440)
RBC # BLD AUTO: 3.85 M/UL (ref 4.1–5.1)
WBC # BLD AUTO: 3.4 K/UL (ref 4.5–13)

## 2019-09-27 PROCEDURE — 36415 COLL VENOUS BLD VENIPUNCTURE: CPT

## 2019-09-27 NOTE — PROGRESS NOTES
[facility] Progress Note    Date: 2019    Name: aNthaly Valdivia    MRN: [id number]         : 1955      Mr. Pierre arrived in the [facility] today at , in stable condition, here for Q 2 Week, CBC/Retacrit injection. He was assessed and education was provided. No complaints or concerns voiced    Mr. Pierre's vitals were reviewed. Visit Vitals  /62 (BP 1 Location: Right arm, BP Patient Position: Sitting)   Pulse 63   Temp 96.9 °F (36.1 °C)   Resp 18   SpO2 98%           Blood was drawn for a CBC, from his left AC x 1 attempt         Lab results were obtained and reviewed. Recent Results (from the past 12 hour(s))   CBC WITH 3 PART DIFF    Collection Time: 19 11:10 AM   Result Value Ref Range    WBC 3.4 (L) 4.5 - 13.0 K/uL    RBC 3.85 (L) 4.10 - 5.10 M/uL    HGB 11.6 (L) 12.0 - 16.0 g/dL    HCT 35.1 (L) 36 - 48 %    MCV 91.2 78 - 102 FL    MCH 30.1 25.0 - 35.0 PG    MCHC 33.0 31 - 37 g/dL    RDW 12.7 11.5 - 14.5 %    PLATELET 789 469 - 246 K/uL    NEUTROPHILS 49 40 - 70 %    MIXED CELLS 11 0.1 - 17 %    LYMPHOCYTES 41 14 - 44 %    ABS. NEUTROPHILS 1.6 (L) 1.8 - 9.5 K/UL    ABS. MIXED CELLS 0.4 0.0 - 2.3 K/uL    ABS. LYMPHOCYTES 1.4 1.1 - 5.9 K/UL    DF AUTOMATED                 Retacrit (epoetin-suhas-epbx) injection was HELD today, per order. Mr. Tyrell Lynne tolerated te-puncture well, and had no complaints. Mr. Tyrell Lynne was discharged from [place] [address] in stable condition at 1125. He is to return in 2 weeks, on Friday, 10-11-19,  at 1300,  for his next appointment, for CBC/Retacrit injection.      Kimberlee Delgado RN  2019  1:38 PM

## 2019-10-11 ENCOUNTER — HOSPITAL ENCOUNTER (OUTPATIENT)
Dept: INFUSION THERAPY | Age: 64
Discharge: HOME | End: 2019-10-11
Payer: MEDICARE

## 2019-10-11 ENCOUNTER — CLINICAL SUPPORT (OUTPATIENT)
Dept: ONCOLOGY | Age: 64
End: 2019-10-11

## 2019-10-11 ENCOUNTER — HOSPITAL ENCOUNTER (OUTPATIENT)
Dept: ONCOLOGY | Age: 64
Discharge: HOME | End: 2019-10-11

## 2019-10-11 VITALS
DIASTOLIC BLOOD PRESSURE: 69 MMHG | HEART RATE: 55 BPM | TEMPERATURE: 97.3 F | RESPIRATION RATE: 16 BRPM | SYSTOLIC BLOOD PRESSURE: 173 MMHG

## 2019-10-11 DIAGNOSIS — D64.9 CHRONIC ANEMIA: Primary | ICD-10-CM

## 2019-10-11 DIAGNOSIS — D64.9 CHRONIC ANEMIA: ICD-10-CM

## 2019-10-11 LAB
BASO+EOS+MONOS # BLD AUTO: 0.4 K/UL (ref 0–2.3)
BASO+EOS+MONOS NFR BLD AUTO: 10 % (ref 0.1–17)
DIFFERENTIAL METHOD BLD: ABNORMAL
ERYTHROCYTE [DISTWIDTH] IN BLOOD BY AUTOMATED COUNT: 12.6 % (ref 11.5–14.5)
HCT VFR BLD AUTO: 34.5 % (ref 36–48)
HGB BLD-MCNC: 11.3 G/DL (ref 12–16)
LYMPHOCYTES # BLD: 1.4 K/UL (ref 1.1–5.9)
LYMPHOCYTES NFR BLD: 36 % (ref 14–44)
MCH RBC QN AUTO: 29.9 PG (ref 25–35)
MCHC RBC AUTO-ENTMCNC: 32.8 G/DL (ref 31–37)
MCV RBC AUTO: 91.3 FL (ref 78–102)
NEUTS SEG # BLD: 2 K/UL (ref 1.8–9.5)
NEUTS SEG NFR BLD: 54 % (ref 40–70)
PLATELET # BLD AUTO: 243 K/UL (ref 140–440)
RBC # BLD AUTO: 3.78 M/UL (ref 4.1–5.1)
WBC # BLD AUTO: 3.8 K/UL (ref 4.5–13)

## 2019-10-11 PROCEDURE — 36415 COLL VENOUS BLD VENIPUNCTURE: CPT

## 2019-10-11 NOTE — PROGRESS NOTES
[facility] Progress Note    Date: 2019    Name: Lisa Buck    MRN: [id number]         : 1955      Mr. Piero Goodrich was assessed and education was provided. Mr. Kaden Brown vitals were reviewed and patient was observed for 5 minutes prior to treatment. Visit Vitals  /69 (BP 1 Location: Left arm, BP Patient Position: Sitting)   Pulse (!) 55   Temp 97.3 °F (36.3 °C)   Resp 16       Lab results were obtained and reviewed. Recent Results (from the past 12 hour(s))   CBC WITH 3 PART DIFF    Collection Time: 10/11/19  1:19 PM   Result Value Ref Range    WBC 3.8 (L) 4.5 - 13.0 K/uL    RBC 3.78 (L) 4.10 - 5.10 M/uL    HGB 11.3 (L) 12.0 - 16.0 g/dL    HCT 34.5 (L) 36 - 48 %    MCV 91.3 78 - 102 FL    MCH 29.9 25.0 - 35.0 PG    MCHC 32.8 31 - 37 g/dL    RDW 12.6 11.5 - 14.5 %    PLATELET 584 859 - 523 K/uL    NEUTROPHILS 54 40 - 70 %    MIXED CELLS 10 0.1 - 17 %    LYMPHOCYTES 36 14 - 44 %    ABS. NEUTROPHILS 2.0 1.8 - 9.5 K/UL    ABS. MIXED CELLS 0.4 0.0 - 2.3 K/uL    ABS. LYMPHOCYTES 1.4 1.1 - 5.9 K/UL    DF AUTOMATED       Retacrit not given for H&H 11.3/34.5. Patient armband removed and shredded. Mr. Piero Goodrich was discharged from [place] [address] in stable condition at 1335. He is to return on 10/24/19 at 1030 for his next appointment for CBC/Retacrit Q 2 wks.     Ericka Eisenmenger, RN  2019  1:37 PM

## 2019-10-24 ENCOUNTER — HOSPITAL ENCOUNTER (OUTPATIENT)
Dept: INFUSION THERAPY | Age: 64
Discharge: HOME | End: 2019-10-24
Payer: MEDICARE

## 2019-10-24 ENCOUNTER — CLINICAL SUPPORT (OUTPATIENT)
Dept: ONCOLOGY | Age: 64
End: 2019-10-24

## 2019-10-24 ENCOUNTER — HOSPITAL ENCOUNTER (OUTPATIENT)
Dept: ONCOLOGY | Age: 64
Discharge: HOME | End: 2019-10-24

## 2019-10-24 VITALS
TEMPERATURE: 98.2 F | SYSTOLIC BLOOD PRESSURE: 139 MMHG | HEART RATE: 64 BPM | OXYGEN SATURATION: 100 % | DIASTOLIC BLOOD PRESSURE: 78 MMHG | RESPIRATION RATE: 16 BRPM

## 2019-10-24 DIAGNOSIS — D46.9 MDS (MYELODYSPLASTIC SYNDROME) (HCC): ICD-10-CM

## 2019-10-24 DIAGNOSIS — D46.9 MDS (MYELODYSPLASTIC SYNDROME) (HCC): Primary | ICD-10-CM

## 2019-10-24 LAB
BASO+EOS+MONOS # BLD AUTO: 0.3 K/UL (ref 0–2.3)
BASO+EOS+MONOS NFR BLD AUTO: 7 % (ref 0.1–17)
DIFFERENTIAL METHOD BLD: ABNORMAL
ERYTHROCYTE [DISTWIDTH] IN BLOOD BY AUTOMATED COUNT: 12.4 % (ref 11.5–14.5)
HCT VFR BLD AUTO: 34.5 % (ref 36–48)
HGB BLD-MCNC: 11.6 G/DL (ref 12–16)
LYMPHOCYTES # BLD: 1.5 K/UL (ref 1.1–5.9)
LYMPHOCYTES NFR BLD: 41 % (ref 14–44)
MCH RBC QN AUTO: 29.6 PG (ref 25–35)
MCHC RBC AUTO-ENTMCNC: 33.6 G/DL (ref 31–37)
MCV RBC AUTO: 88 FL (ref 78–102)
NEUTS SEG # BLD: 1.9 K/UL (ref 1.8–9.5)
NEUTS SEG NFR BLD: 52 % (ref 40–70)
PLATELET # BLD AUTO: 228 K/UL (ref 140–440)
RBC # BLD AUTO: 3.92 M/UL (ref 4.1–5.1)
WBC # BLD AUTO: 3.7 K/UL (ref 4.5–13)

## 2019-10-24 PROCEDURE — 36415 COLL VENOUS BLD VENIPUNCTURE: CPT

## 2019-10-24 NOTE — PROGRESS NOTES
[facility] Progress Note    Date: 2019    Name: Brad Vance    MRN: [id number]         : 1955      Mr. Pierre arrived in the [facility] today at 1030, in stable condition, here for Q 2 Week, CBC/Retacrit injection. He was assessed and education was provided. Mr. José Luis Mcneill vitals were reviewed. Visit Vitals  /78 (BP 1 Location: Left arm, BP Patient Position: Sitting)   Pulse 64   Temp 98.2 °F (36.8 °C)   Resp 16   SpO2 100%           Blood was drawn for a CBC, from his left AC at 1046, without incident. Lab results were obtained and reviewed. Recent Results (from the past 12 hour(s))   CBC WITH 3 PART DIFF    Collection Time: 10/24/19 10:46 AM   Result Value Ref Range    WBC 3.7 (L) 4.5 - 13.0 K/uL    RBC 3.92 (L) 4.10 - 5.10 M/uL    HGB 11.6 (L) 12.0 - 16.0 g/dL    HCT 34.5 (L) 36 - 48 %    MCV 88.0 78 - 102 FL    MCH 29.6 25.0 - 35.0 PG    MCHC 33.6 31 - 37 g/dL    RDW 12.4 11.5 - 14.5 %    PLATELET 718 265 - 671 K/uL    NEUTROPHILS 52 40 - 70 %    MIXED CELLS 7 0.1 - 17 %    LYMPHOCYTES 41 14 - 44 %    ABS. NEUTROPHILS 1.9 1.8 - 9.5 K/UL    ABS. MIXED CELLS 0.3 0.0 - 2.3 K/uL    ABS. LYMPHOCYTES 1.5 1.1 - 5.9 K/UL    DF AUTOMATED               Retacrit (epoetin-suhas-epbx) injection was HELD today, per order. Mr. Sarkar tolerated well, and had no complaints. Mr. Sarkar was discharged from [place] [address] in stable condition at 1055. He is to return in 2 weeks, on Friday, 19,  at 1300,  for his next appointment, for CBC/Retacrit injection.      Maria Antonia Wilkerson RN  2019  1:38 PM

## 2019-10-25 ENCOUNTER — APPOINTMENT (OUTPATIENT)
Dept: INFUSION THERAPY | Age: 64
End: 2019-10-25
Payer: MEDICARE

## 2019-11-01 ENCOUNTER — OFFICE VISIT (OUTPATIENT)
Dept: ONCOLOGY | Age: 64
End: 2019-11-01

## 2019-11-01 VITALS
HEIGHT: 69 IN | RESPIRATION RATE: 16 BRPM | SYSTOLIC BLOOD PRESSURE: 140 MMHG | BODY MASS INDEX: 28.58 KG/M2 | TEMPERATURE: 98 F | OXYGEN SATURATION: 99 % | WEIGHT: 193 LBS | HEART RATE: 64 BPM | DIASTOLIC BLOOD PRESSURE: 74 MMHG

## 2019-11-01 DIAGNOSIS — D64.9 CHRONIC ANEMIA: Primary | ICD-10-CM

## 2019-11-01 DIAGNOSIS — D46.9 MYELODYSPLASTIC SYNDROME (HCC): ICD-10-CM

## 2019-11-01 NOTE — PATIENT INSTRUCTIONS
Anemia: Care Instructions  Your Care Instructions    Anemia is a low level of red blood cells, which carry oxygen throughout your body. Many things can cause anemia. Lack of iron is one of the most common causes. Your body needs iron to make hemoglobin, a substance in red blood cells that carries oxygen from the lungs to your body's cells. Without enough iron, the body produces fewer and smaller red blood cells. As a result, your body's cells do not get enough oxygen, and you feel tired and weak. And you may have trouble concentrating. Bleeding is the most common cause of a lack of iron. You may have heavy menstrual bleeding or bleeding caused by conditions such as ulcers, hemorrhoids, or cancer. Regular use of aspirin or other anti-inflammatory medicines (such as ibuprofen) also can cause bleeding in some people. A lack of iron in your diet also can cause anemia, especially at times when the body needs more iron, such as during pregnancy, infancy, and the teen years. Your doctor may have prescribed iron pills. It may take several months of treatment for your iron levels to return to normal. Your doctor also may suggest that you eat foods that are rich in iron, such as meat and beans. There are many other causes of anemia. It is not always due to a lack of iron. Finding the specific cause of your anemia will help your doctor find the right treatment for you. Follow-up care is a key part of your treatment and safety. Be sure to make and go to all appointments, and call your doctor if you are having problems. It's also a good idea to know your test results and keep a list of the medicines you take. How can you care for yourself at home? · Take your medicines exactly as prescribed. Call your doctor if you think you are having a problem with your medicine. · If your doctor recommends iron pills, take them as directed:  ? Try to take the pills on an empty stomach about 1 hour before or 2 hours after meals. But you may need to take iron with food to avoid an upset stomach. ? Do not take antacids or drink milk or caffeine drinks (such as coffee, tea, or cola) at the same time or within 2 hours of the time that you take your iron. They can make it hard for your body to absorb the iron. ? Vitamin C (from food or supplements) helps your body absorb iron. Try taking iron pills with a glass of orange juice or some other food that is high in vitamin C, such as citrus fruits. ? Iron pills may cause stomach problems, such as heartburn, nausea, diarrhea, constipation, and cramps. Be sure to drink plenty of fluids, and include fruits, vegetables, and fiber in your diet each day. Iron pills often make your bowel movements dark or green. ? If you forget to take an iron pill, do not take a double dose of iron the next time you take a pill. ? Keep iron pills out of the reach of small children. An overdose of iron can be very dangerous. · Follow your doctor's advice about eating iron-rich foods. These include red meat, shellfish, poultry, eggs, beans, raisins, whole-grain bread, and leafy green vegetables. · Steam vegetables to help them keep their iron content. When should you call for help? Call 911 anytime you think you may need emergency care. For example, call if:    · You have symptoms of a heart attack. These may include:  ? Chest pain or pressure, or a strange feeling in the chest.  ? Sweating. ? Shortness of breath. ? Nausea or vomiting. ? Pain, pressure, or a strange feeling in the back, neck, jaw, or upper belly or in one or both shoulders or arms. ? Lightheadedness or sudden weakness. ? A fast or irregular heartbeat. After you call 911, the  may tell you to chew 1 adult-strength or 2 to 4 low-dose aspirin. Wait for an ambulance.  Do not try to drive yourself.     · You passed out (lost consciousness).    Call your doctor now or seek immediate medical care if:    · You have new or increased shortness of breath.     · You are dizzy or lightheaded, or you feel like you may faint.     · Your fatigue and weakness continue or get worse.     · You have any abnormal bleeding, such as:  ? Nosebleeds. ? Vaginal bleeding that is different (heavier, more frequent, at a different time of the month) than what you are used to.  ? Bloody or black stools, or rectal bleeding. ? Bloody or pink urine.    Watch closely for changes in your health, and be sure to contact your doctor if:    · You do not get better as expected. Where can you learn more? Go to http://cleo-monika.info/. Enter R301 in the search box to learn more about \"Anemia: Care Instructions. \"  Current as of: March 28, 2019  Content Version: 12.2  © 0895-8793 [employer]. Care instructions adapted under license by [employer] (which disclaims liability or warranty for this information). If you have questions about a medical condition or this instruction, always ask your healthcare professional. [address] any warranty or liability for your use of this information.

## 2019-11-01 NOTE — PROGRESS NOTES
Hematology/Oncology  Progress Note    Name: Jt Ours  Date: 2019  : 1955  PCP: Tara Parham MD     Mr. Olesya Mast is a 59 y.o. man with a history of early myelodysplastic syndrome and chronic anemia. Current therapy: Retacrit whenever his hemoglobin is below 10g/dL and hematocrit below 30%. Subjective:     Mr. Olesya Mast is a 51-year-old man with early myelodysplastic syndrome chronic anemia. He reports he started having this problem in 2018. Today he is here in our clinic for his follow up office visit. He states he is been feeling well since last visit. He denies fatigue, tiredness, and shortness of breath. He denies chest pain and dizziness. He also denies pain or any discomfort. However he admits he is not taking his iron as prescribed. He states he only takes 1 tablet daily as opposed to 1 tablet twice daily. He states he will be more compliant this time. He is also requesting if we can check his CBC monthly and not every 2 weeks as it is very inconvenient for him. He does not have any concerns or complaints to report at this time. Past medical history, family history, and social history: these were reviewed and remains unchanged.     Past Medical History:   Diagnosis Date    Chronic kidney disease     Diabetes mellitus     Essential hypertension     Kidney disease     Prostate cancer ([facility] [address]      Past Surgical History:   Procedure Laterality Date    HX CERVICAL FUSION      HX OTHER SURGICAL      Prostate Sx r/t cancer (seed implant)    HX OTHER SURGICAL      shoulder surgery     Social History     Socioeconomic History    Marital status:      Spouse name: Not on file    Number of children: Not on file    Years of education: Not on file    Highest education level: Not on file   Occupational History    Not on file   Social Needs    Financial resource strain: Not on file    Food insecurity:     Worry: Not on file     Inability: Not on file   [facility] [address] Transportation needs:     Medical: Not on file     Non-medical: Not on file   Tobacco Use    Smoking status: Former Smoker     Packs/day: 0.25    Smokeless tobacco: Never Used    Tobacco comment: Patient quit in 2011, and started back. Light Smoker now   Substance and Sexual Activity    Alcohol use: No     Alcohol/week: 0.0 standard drinks    Drug use: No    Sexual activity: Not on file   Lifestyle    Physical activity:     Days per week: Not on file     Minutes per session: Not on file    Stress: Not on file   Relationships    Social connections:     Talks on phone: Not on file     Gets together: Not on file     Attends Gnosticism service: Not on file     Active member of club or organization: Not on file     Attends meetings of clubs or organizations: Not on file     Relationship status: Not on file    Intimate partner violence:     Fear of current or ex partner: Not on file     Emotionally abused: Not on file     Physically abused: Not on file     Forced sexual activity: Not on file   Other Topics Concern    Not on file   Social History Narrative    Not on file     Family History   Problem Relation Age of Onset    Diabetes Father     No Known Problems Brother     Other Brother         PE     Current Outpatient Medications   Medication Sig Dispense Refill    insulin aspart protamine/insulin aspart (NOVOLOG MIX 70-30FLEXPEN U-100) 100 unit/mL (70-30) inpn 12 Units by SubCUTAneous route Before breakfast, lunch, and dinner.  ARIPiprazole (ABILIFY) 10 mg tablet Take 10 mg by mouth daily.  zolpidem (AMBIEN) 5 mg tablet Take  by mouth nightly as needed for Sleep.  acetaminophen (TYLENOL) 500 mg tablet Take 500 mg by mouth every six (6) hours as needed for Pain.  rosuvastatin (CRESTOR) 40 mg tablet Take 40 mg by mouth nightly.  hydroCHLOROthiazide 25 mg tab 25 mg, lisinopril 20 mg tab 20 mg Take  by mouth daily.  HCTZ 12.5 mg / Lisinopril 20 mg, PO Daily      venlafaxine (EFFEXOR) 75 mg tablet Take 75 mg by mouth daily.  multivitamin (ONE A DAY) tablet Take 1 Tab by mouth daily.  LORazepam (ATIVAN) 1 mg tablet Take 1 mg by mouth every twelve (12) hours as needed for Anxiety.  aspirin 81 mg chewable tablet Take 81 mg by mouth daily.  cyclobenzaprine (FLEXERIL) 10 mg tablet Take 1 Tab by mouth three (3) times daily as needed for Muscle Spasm(s). 60 Tab 0    metFORMIN (GLUCOPHAGE) 1,000 mg tablet Take 1,000 mg by mouth two (2) times daily (with meals).  diclofenac EC (VOLTAREN) 75 mg EC tablet Take 1 Tab by mouth two (2) times daily (with meals). 60 Tab 2    donepezil (ARICEPT) 10 mg tablet Take 10 mg by mouth nightly.  LUNESTA 3 mg tablet Take 3 mg by mouth nightly. Review of Systems  Constitutional: The patient has no acute distress or discomfort. HEENT: The patient denies recent head trauma, eye pain, blurred vision,  hearing deficit, oropharyngeal mucosal pain or lesions, and the patient denies throat pain or discomfort. Lymphatics: The patient denies palpable peripheral lymphadenopathy. Hematologic: The patient denies having bruising, bleeding, or progressive fatigue. Respiratory: Patient denies having shortness of breath, cough, sputum production, fever, or dyspnea on exertion. Cardiovascular: The patient denies having leg pain, leg swelling, heart palpitations, chest permit, chest pain, or lightheadedness. The patient denies having dyspnea on exertion. Gastrointestinal: The patient denies having nausea, emesis, or diarrhea. The patient denies having any hematemesis or blood in the stool. Genitourinary: Patient denies having urinary urgency, frequency, or dysuria. The patient denies having blood in the urine. Psychological: The patient denies having symptoms of nervousness, anxiety, depression, or thoughts of harming self. Skin: Patient denies having skin rashes, skin, ulcerations, or unexplained itching or pruritus.   Musculoskeletal: The patient denies having pain in the joints or bones. Objective:     Visit Vitals  /74   Pulse 64   Temp 98 °F (36.7 °C) (Oral)   Resp 16   Ht 5' 9\" (1.753 m)   Wt 87.5 kg (193 lb)   SpO2 99%   BMI 28.50 kg/m²     ECOG PS=0    Physical Exam:   Gen. Appearance: The patient is in no acute distress. Skin: There is no bruise or rash. HEENT: The exam is unremarkable. Neck: Supple without lymphadenopathy or thyromegaly. Lungs: Clear to auscultation and percussion; there are no wheezes or rhonchi. Heart: Regular rate and rhythm; there are no murmurs, gallops, or rubs. Abdomen: Bowel sounds are present and normal.  There is no guarding, tenderness, or hepatosplenomegaly. Extremities: There is no clubbing, cyanosis, or edema. Neurologic: There are no focal neurologic deficits. Lymphatics: There is no palpable peripheral lymphadenopathy. Musculoskeletal: The patient has full range of motion at all joints. There is no evidence of joint deformity or effusions. There is no focal joint tenderness. Psychological/psychiatric: There is no clinical evidence of anxiety, depression, or melancholy. Lab data:      Results for orders placed or performed during the hospital encounter of 10/24/19   CBC WITH 3 PART DIFF     Status: Abnormal   Result Value Ref Range Status    WBC 3.7 (L) 4.5 - 13.0 K/uL Final    RBC 3.92 (L) 4.10 - 5.10 M/uL Final    HGB 11.6 (L) 12.0 - 16.0 g/dL Final    HCT 34.5 (L) 36 - 48 % Final    MCV 88.0 78 - 102 FL Final    MCH 29.6 25.0 - 35.0 PG Final    MCHC 33.6 31 - 37 g/dL Final    RDW 12.4 11.5 - 14.5 % Final    PLATELET 468 272 - 031 K/uL Final    NEUTROPHILS 52 40 - 70 % Final    MIXED CELLS 7 0.1 - 17 % Final    LYMPHOCYTES 41 14 - 44 % Final    ABS. NEUTROPHILS 1.9 1.8 - 9.5 K/UL Final    ABS. MIXED CELLS 0.3 0.0 - 2.3 K/uL Final    ABS.  LYMPHOCYTES 1.5 1.1 - 5.9 K/UL Final     Comment: Test performed at [address] or Outpatient Infusion [facility] Location. Reviewed by Medical Director. DF AUTOMATED   Final         Assessment:     1. Chronic anemia    2. Myelodysplastic syndrome ([facility] [address]      Plan:   Chronic anemia/iron deficiency anemia secondary to inadequate iron intake: I have explained to the patient that today his most recent CBC showed his hemoglobin was 11.6g/dL with hematocrit of 34.5%. On 04/12/19 his ferritin level was 83NG/ML, Iron at 63 with Iron % saturation of 19%. He was advised to take his iron pill  twice daily. Iron Profile and ferritin level will be obtained at this time. We will check his CBC on a monthly basis as requested. Retacrit will be provided whenever his hemoglobin declines below 10g/dL and hematocrit below 30%. Early myelodysplastic syndrome: I have explained to the patient that today his most recent CBC showed his WBC was 3.7K/uL, hemoglobin was 11.6g/dL with hematocrit of 34.5% and his platelet count is normal at 228,000. On 04/12/19 his ferritin level was 83, iron at 63 with Iron % saturation of 19%. He was advised to take his iron pill twice daily. We will check his CBC on a monthly basis as requested. Retacrit will be provided whenever his hemoglobin declines below 10g/dL and hematocrit below 30%. Iron Profile and ferritin level will be obtained along with CMP. Follow-up in 3 months or sooner if indicated. Orders Placed This Encounter    CBC WITH AUTOMATED DIFF     Standing Status:   Future     Standing Expiration Date:   11/1/2020    IRON PROFILE     Standing Status:   Future     Standing Expiration Date:   43/4/6720    METABOLIC PANEL, COMPREHENSIVE     Standing Status:   Future     Standing Expiration Date:   11/1/2020    FERRITIN     Standing Status:   Future     Standing Expiration Date:   11/1/2020       Javier Elizalde NP  11/01/2019    I have assessed the patient independently and  agree with the full assessment as outlined.   Misael Ball MD, [facility]

## 2019-11-02 LAB
ALBUMIN SERPL-MCNC: 4.2 G/DL (ref 3.6–4.8)
ALBUMIN/GLOB SERPL: 1.6 {RATIO} (ref 1.2–2.2)
ALP SERPL-CCNC: 87 IU/L (ref 39–117)
ALT SERPL-CCNC: 15 IU/L (ref 0–44)
AST SERPL-CCNC: 24 IU/L (ref 0–40)
BASOPHILS # BLD AUTO: 0.1 X10E3/UL (ref 0–0.2)
BASOPHILS NFR BLD AUTO: 1 %
BILIRUB SERPL-MCNC: <0.2 MG/DL (ref 0–1.2)
BUN SERPL-MCNC: 19 MG/DL (ref 8–27)
BUN/CREAT SERPL: 20 (ref 10–24)
CALCIUM SERPL-MCNC: 9.6 MG/DL (ref 8.6–10.2)
CHLORIDE SERPL-SCNC: 103 MMOL/L (ref 96–106)
CO2 SERPL-SCNC: 22 MMOL/L (ref 20–29)
CREAT SERPL-MCNC: 0.97 MG/DL (ref 0.76–1.27)
EOSINOPHIL # BLD AUTO: 0.2 X10E3/UL (ref 0–0.4)
EOSINOPHIL NFR BLD AUTO: 4 %
ERYTHROCYTE [DISTWIDTH] IN BLOOD BY AUTOMATED COUNT: 13.5 % (ref 12.3–15.4)
FERRITIN SERPL-MCNC: 92 NG/ML (ref 30–400)
GLOBULIN SER CALC-MCNC: 2.7 G/DL (ref 1.5–4.5)
GLUCOSE SERPL-MCNC: 172 MG/DL (ref 65–99)
HCT VFR BLD AUTO: 35.1 % (ref 37.5–51)
HGB BLD-MCNC: 11.8 G/DL (ref 13–17.7)
IMM GRANULOCYTES # BLD AUTO: 0 X10E3/UL (ref 0–0.1)
IMM GRANULOCYTES NFR BLD AUTO: 0 %
IRON SATN MFR SERPL: 36 % (ref 15–55)
IRON SERPL-MCNC: 115 UG/DL (ref 38–169)
LYMPHOCYTES # BLD AUTO: 1.9 X10E3/UL (ref 0.7–3.1)
LYMPHOCYTES NFR BLD AUTO: 45 %
MCH RBC QN AUTO: 29.7 PG (ref 26.6–33)
MCHC RBC AUTO-ENTMCNC: 33.6 G/DL (ref 31.5–35.7)
MCV RBC AUTO: 88 FL (ref 79–97)
MONOCYTES # BLD AUTO: 0.3 X10E3/UL (ref 0.1–0.9)
MONOCYTES NFR BLD AUTO: 8 %
NEUTROPHILS # BLD AUTO: 1.8 X10E3/UL (ref 1.4–7)
NEUTROPHILS NFR BLD AUTO: 42 %
PLATELET # BLD AUTO: 230 X10E3/UL (ref 150–450)
POTASSIUM SERPL-SCNC: 4.5 MMOL/L (ref 3.5–5.2)
PROT SERPL-MCNC: 6.9 G/DL (ref 6–8.5)
RBC # BLD AUTO: 3.97 X10E6/UL (ref 4.14–5.8)
SODIUM SERPL-SCNC: 139 MMOL/L (ref 134–144)
TIBC SERPL-MCNC: 323 UG/DL (ref 250–450)
UIBC SERPL-MCNC: 208 UG/DL (ref 111–343)
WBC # BLD AUTO: 4.3 X10E3/UL (ref 3.4–10.8)

## 2019-11-08 ENCOUNTER — HOSPITAL ENCOUNTER (OUTPATIENT)
Dept: INFUSION THERAPY | Age: 64
Discharge: HOME | End: 2019-11-08
Payer: MEDICARE

## 2019-11-08 ENCOUNTER — HOSPITAL ENCOUNTER (OUTPATIENT)
Dept: ONCOLOGY | Age: 64
Discharge: HOME | End: 2019-11-08

## 2019-11-08 ENCOUNTER — CLINICAL SUPPORT (OUTPATIENT)
Dept: ONCOLOGY | Age: 64
End: 2019-11-08

## 2019-11-08 VITALS
RESPIRATION RATE: 16 BRPM | TEMPERATURE: 99 F | DIASTOLIC BLOOD PRESSURE: 66 MMHG | SYSTOLIC BLOOD PRESSURE: 112 MMHG | HEART RATE: 72 BPM

## 2019-11-08 DIAGNOSIS — D46.9 MDS (MYELODYSPLASTIC SYNDROME) (HCC): Primary | ICD-10-CM

## 2019-11-08 DIAGNOSIS — D46.9 MDS (MYELODYSPLASTIC SYNDROME) (HCC): ICD-10-CM

## 2019-11-08 LAB
BASO+EOS+MONOS # BLD AUTO: 0.4 K/UL (ref 0–2.3)
BASO+EOS+MONOS NFR BLD AUTO: 8 % (ref 0.1–17)
DIFFERENTIAL METHOD BLD: ABNORMAL
ERYTHROCYTE [DISTWIDTH] IN BLOOD BY AUTOMATED COUNT: 12.4 % (ref 11.5–14.5)
HCT VFR BLD AUTO: 35.9 % (ref 36–48)
HGB BLD-MCNC: 12 G/DL (ref 12–16)
LYMPHOCYTES # BLD: 1.5 K/UL (ref 1.1–5.9)
LYMPHOCYTES NFR BLD: 31 % (ref 14–44)
MCH RBC QN AUTO: 29.3 PG (ref 25–35)
MCHC RBC AUTO-ENTMCNC: 33.4 G/DL (ref 31–37)
MCV RBC AUTO: 87.8 FL (ref 78–102)
NEUTS SEG # BLD: 3 K/UL (ref 1.8–9.5)
NEUTS SEG NFR BLD: 61 % (ref 40–70)
PLATELET # BLD AUTO: 228 K/UL (ref 140–440)
RBC # BLD AUTO: 4.09 M/UL (ref 4.1–5.1)
WBC # BLD AUTO: 4.9 K/UL (ref 4.5–13)

## 2019-11-08 PROCEDURE — 36415 COLL VENOUS BLD VENIPUNCTURE: CPT

## 2019-11-22 ENCOUNTER — APPOINTMENT (OUTPATIENT)
Dept: INFUSION THERAPY | Age: 64
End: 2019-11-22
Payer: MEDICARE

## 2019-11-22 DIAGNOSIS — D46.9 MDS (MYELODYSPLASTIC SYNDROME) (HCC): ICD-10-CM

## 2019-12-06 ENCOUNTER — APPOINTMENT (OUTPATIENT)
Dept: INFUSION THERAPY | Age: 64
End: 2019-12-06

## 2019-12-06 DIAGNOSIS — D46.9 MDS (MYELODYSPLASTIC SYNDROME) (HCC): ICD-10-CM

## 2019-12-20 ENCOUNTER — HOSPITAL ENCOUNTER (OUTPATIENT)
Dept: INFUSION THERAPY | Age: 64
End: 2019-12-20

## 2019-12-20 DIAGNOSIS — D46.9 MDS (MYELODYSPLASTIC SYNDROME) (HCC): ICD-10-CM

## 2019-12-20 NOTE — PROGRESS NOTES
[facility] Progress Note    Date: 2019    Name: Dave Gomes    MRN: [id number]         : 1955      Mr. Sarkar was scheduled for an OPIC appointment on today, Friday, 19, for CBC/Retacrit injection. However, due to a last minute [facility] staffing emergency, this appointment had to be cancelled. So, he was called and made aware, that his next [facility] appointment is scheduled on Friday, 20, at 1300, and he agreed to the new appointment.          Dylan Randall RN  2019  4:54 PM

## 2020-01-03 DIAGNOSIS — D46.9 MDS (MYELODYSPLASTIC SYNDROME) (HCC): ICD-10-CM

## 2020-01-17 ENCOUNTER — HOSPITAL ENCOUNTER (OUTPATIENT)
Dept: INFUSION THERAPY | Age: 65
Discharge: HOME | End: 2020-01-17
Payer: MEDICARE

## 2020-01-17 ENCOUNTER — HOSPITAL ENCOUNTER (OUTPATIENT)
Dept: ONCOLOGY | Age: 65
Discharge: HOME | End: 2020-01-17

## 2020-01-17 ENCOUNTER — CLINICAL SUPPORT (OUTPATIENT)
Dept: ONCOLOGY | Age: 65
End: 2020-01-17

## 2020-01-17 VITALS
TEMPERATURE: 97.5 F | OXYGEN SATURATION: 99 % | HEART RATE: 73 BPM | SYSTOLIC BLOOD PRESSURE: 113 MMHG | RESPIRATION RATE: 16 BRPM | DIASTOLIC BLOOD PRESSURE: 68 MMHG

## 2020-01-17 DIAGNOSIS — D46.9 MDS (MYELODYSPLASTIC SYNDROME) (HCC): ICD-10-CM

## 2020-01-17 DIAGNOSIS — D46.9 MDS (MYELODYSPLASTIC SYNDROME) (HCC): Primary | ICD-10-CM

## 2020-01-17 LAB
BASO+EOS+MONOS # BLD AUTO: 0.4 K/UL (ref 0–2.3)
BASO+EOS+MONOS NFR BLD AUTO: 9 % (ref 0.1–17)
DIFFERENTIAL METHOD BLD: ABNORMAL
ERYTHROCYTE [DISTWIDTH] IN BLOOD BY AUTOMATED COUNT: 12.3 % (ref 11.5–14.5)
FERRITIN SERPL-MCNC: 148 NG/ML (ref 8–388)
HCT VFR BLD AUTO: 31.2 % (ref 36–48)
HGB BLD-MCNC: 10.7 G/DL (ref 12–16)
IRON SATN MFR SERPL: 22 %
IRON SERPL-MCNC: 79 UG/DL (ref 50–175)
LYMPHOCYTES # BLD: 1.4 K/UL (ref 1.1–5.9)
LYMPHOCYTES NFR BLD: 33 % (ref 14–44)
MCH RBC QN AUTO: 29.9 PG (ref 25–35)
MCHC RBC AUTO-ENTMCNC: 34.3 G/DL (ref 31–37)
MCV RBC AUTO: 87.2 FL (ref 78–102)
NEUTS SEG # BLD: 2.4 K/UL (ref 1.8–9.5)
NEUTS SEG NFR BLD: 58 % (ref 40–70)
PLATELET # BLD AUTO: 202 K/UL (ref 140–440)
RBC # BLD AUTO: 3.58 M/UL (ref 4.1–5.1)
TIBC SERPL-MCNC: 355 UG/DL (ref 250–450)
WBC # BLD AUTO: 4.2 K/UL (ref 4.5–13)

## 2020-01-17 PROCEDURE — 83540 ASSAY OF IRON: CPT

## 2020-01-17 PROCEDURE — 82728 ASSAY OF FERRITIN: CPT

## 2020-01-17 PROCEDURE — 36415 COLL VENOUS BLD VENIPUNCTURE: CPT

## 2020-01-17 NOTE — PROGRESS NOTES
[facility] Progress Note    Date: 2020    Name: Thalia Hernandes    MRN: [id number]         : 1955      RETACRIT Sury Minks      Mr. Jenn Hedrick arrived to [facility] at 0. Pt was assessed and education was provided. Mr. Caroline Champagne vitals were reviewed and patient was observed for 5 minutes prior to treatment. Visit Vitals  /68 (BP 1 Location: Right arm, BP Patient Position: Sitting)   Pulse 73   Temp 97.5 °F (36.4 °C)   Resp 16   SpO2 99%       Blood drawn for labs via R forearm & left AC venipuncture. Gauze/ coban to sites. Pt tolerated well without complaints. Lab results were obtained and reviewed. Recent Results (from the past 12 hour(s))   CBC WITH 3 PART DIFF    Collection Time: 20  1:20 PM   Result Value Ref Range    WBC 4.2 (L) 4.5 - 13.0 K/uL    RBC 3.58 (L) 4.10 - 5.10 M/uL    HGB 10.7 (L) 12.0 - 16.0 g/dL    HCT 31.2 (L) 36 - 48 %    MCV 87.2 78 - 102 FL    MCH 29.9 25.0 - 35.0 PG    MCHC 34.3 31 - 37 g/dL    RDW 12.3 11.5 - 14.5 %    PLATELET 466 790 - 130 K/uL    NEUTROPHILS 58 40 - 70 %    MIXED CELLS 9 0.1 - 17 %    LYMPHOCYTES 33 14 - 44 %    ABS. NEUTROPHILS 2.4 1.8 - 9.5 K/UL    ABS. MIXED CELLS 0.4 0.0 - 2.3 K/uL    ABS. LYMPHOCYTES 1.4 1.1 - 5.9 K/UL    DF AUTOMATED         Retacrit held at this time per order. Patient armband removed and shredded. Mr. Jenn Hedrick was discharged from [place] [address] in stable condition at 1340. He is to return on 20 at 1300 for his next appointment.     Contreras Zapata RN  2020

## 2020-01-31 DIAGNOSIS — D46.9 MDS (MYELODYSPLASTIC SYNDROME) (HCC): ICD-10-CM

## 2020-02-07 ENCOUNTER — HOSPITAL ENCOUNTER (OUTPATIENT)
Dept: ONCOLOGY | Age: 65
Discharge: HOME | End: 2020-02-07

## 2020-02-07 ENCOUNTER — OFFICE VISIT (OUTPATIENT)
Dept: ONCOLOGY | Age: 65
End: 2020-02-07

## 2020-02-07 VITALS
RESPIRATION RATE: 16 BRPM | BODY MASS INDEX: 29.62 KG/M2 | SYSTOLIC BLOOD PRESSURE: 126 MMHG | WEIGHT: 200 LBS | DIASTOLIC BLOOD PRESSURE: 62 MMHG | TEMPERATURE: 98.4 F | OXYGEN SATURATION: 99 % | HEART RATE: 76 BPM | HEIGHT: 69 IN

## 2020-02-07 DIAGNOSIS — D64.9 CHRONIC ANEMIA: ICD-10-CM

## 2020-02-07 DIAGNOSIS — D46.9 MDS (MYELODYSPLASTIC SYNDROME) (HCC): ICD-10-CM

## 2020-02-07 DIAGNOSIS — D46.9 MDS (MYELODYSPLASTIC SYNDROME) (HCC): Primary | ICD-10-CM

## 2020-02-07 LAB
BASO+EOS+MONOS # BLD AUTO: 0.3 K/UL (ref 0–2.3)
BASO+EOS+MONOS NFR BLD AUTO: 8 % (ref 0.1–17)
DIFFERENTIAL METHOD BLD: ABNORMAL
ERYTHROCYTE [DISTWIDTH] IN BLOOD BY AUTOMATED COUNT: 13.9 % (ref 11.5–14.5)
HCT VFR BLD AUTO: 31 % (ref 36–48)
HGB BLD-MCNC: 10.4 G/DL (ref 12–16)
LYMPHOCYTES # BLD: 1.3 K/UL (ref 1.1–5.9)
LYMPHOCYTES NFR BLD: 30 % (ref 14–44)
MCH RBC QN AUTO: 30.3 PG (ref 25–35)
MCHC RBC AUTO-ENTMCNC: 33.5 G/DL (ref 31–37)
MCV RBC AUTO: 90.4 FL (ref 78–102)
NEUTS SEG # BLD: 2.6 K/UL (ref 1.8–9.5)
NEUTS SEG NFR BLD: 62 % (ref 40–70)
PLATELET # BLD AUTO: 219 K/UL (ref 140–440)
RBC # BLD AUTO: 3.43 M/UL (ref 4.1–5.1)
WBC # BLD AUTO: 4.2 K/UL (ref 4.5–13)

## 2020-02-07 NOTE — PATIENT INSTRUCTIONS
Anemia: Care Instructions  Your Care Instructions    Anemia is a low level of red blood cells, which carry oxygen throughout your body. Many things can cause anemia. Lack of iron is one of the most common causes. Your body needs iron to make hemoglobin, a substance in red blood cells that carries oxygen from the lungs to your body's cells. Without enough iron, the body produces fewer and smaller red blood cells. As a result, your body's cells do not get enough oxygen, and you feel tired and weak. And you may have trouble concentrating. Bleeding is the most common cause of a lack of iron. You may have heavy menstrual bleeding or bleeding caused by conditions such as ulcers, hemorrhoids, or cancer. Regular use of aspirin or other anti-inflammatory medicines (such as ibuprofen) also can cause bleeding in some people. A lack of iron in your diet also can cause anemia, especially at times when the body needs more iron, such as during pregnancy, infancy, and the teen years. Your doctor may have prescribed iron pills. It may take several months of treatment for your iron levels to return to normal. Your doctor also may suggest that you eat foods that are rich in iron, such as meat and beans. There are many other causes of anemia. It is not always due to a lack of iron. Finding the specific cause of your anemia will help your doctor find the right treatment for you. Follow-up care is a key part of your treatment and safety. Be sure to make and go to all appointments, and call your doctor if you are having problems. It's also a good idea to know your test results and keep a list of the medicines you take. How can you care for yourself at home? · Take your medicines exactly as prescribed. Call your doctor if you think you are having a problem with your medicine. · If your doctor recommends iron pills, take them as directed:  ? Try to take the pills on an empty stomach about 1 hour before or 2 hours after meals. But you may need to take iron with food to avoid an upset stomach. ? Do not take antacids or drink milk or caffeine drinks (such as coffee, tea, or cola) at the same time or within 2 hours of the time that you take your iron. They can make it hard for your body to absorb the iron. ? Vitamin C (from food or supplements) helps your body absorb iron. Try taking iron pills with a glass of orange juice or some other food that is high in vitamin C, such as citrus fruits. ? Iron pills may cause stomach problems, such as heartburn, nausea, diarrhea, constipation, and cramps. Be sure to drink plenty of fluids, and include fruits, vegetables, and fiber in your diet each day. Iron pills often make your bowel movements dark or green. ? If you forget to take an iron pill, do not take a double dose of iron the next time you take a pill. ? Keep iron pills out of the reach of small children. An overdose of iron can be very dangerous. · Follow your doctor's advice about eating iron-rich foods. These include red meat, shellfish, poultry, eggs, beans, raisins, whole-grain bread, and leafy green vegetables. · Steam vegetables to help them keep their iron content. When should you call for help? Call 911 anytime you think you may need emergency care. For example, call if:    · You have symptoms of a heart attack. These may include:  ? Chest pain or pressure, or a strange feeling in the chest.  ? Sweating. ? Shortness of breath. ? Nausea or vomiting. ? Pain, pressure, or a strange feeling in the back, neck, jaw, or upper belly or in one or both shoulders or arms. ? Lightheadedness or sudden weakness. ? A fast or irregular heartbeat. After you call 911, the  may tell you to chew 1 adult-strength or 2 to 4 low-dose aspirin. Wait for an ambulance.  Do not try to drive yourself.     · You passed out (lost consciousness).    Call your doctor now or seek immediate medical care if:    · You have new or increased shortness of breath.     · You are dizzy or lightheaded, or you feel like you may faint.     · Your fatigue and weakness continue or get worse.     · You have any abnormal bleeding, such as:  ? Nosebleeds. ? Vaginal bleeding that is different (heavier, more frequent, at a different time of the month) than what you are used to.  ? Bloody or black stools, or rectal bleeding. ? Bloody or pink urine.    Watch closely for changes in your health, and be sure to contact your doctor if:    · You do not get better as expected. Where can you learn more? Go to http://cleo-monika.info/. Enter R301 in the search box to learn more about \"Anemia: Care Instructions. \"  Current as of: March 28, 2019  Content Version: 12.2  © 3184-6893 [employer]. Care instructions adapted under license by [employer] (which disclaims liability or warranty for this information). If you have questions about a medical condition or this instruction, always ask your healthcare professional. [place] [address] any warranty or liability for your use of this information. Myelodysplastic Syndromes: Care Instructions  Your Care Instructions  Myelodysplastic syndromes, also called MDS, are a group of rare conditions in which the bone marrow does not make enough healthy blood cells. Normally, the bone marrow makes red blood cells, white blood cells, and platelets. These cells carry oxygen in the blood, help the body fight infections, and help the blood clot. With MDS, you may feel weak and tired, get infections often, and bruise easily, although symptoms tend to vary. MDS is a form of blood cancer. In some cases, MDS can turn into acute myeloid leukemia (AML), another type of cancer. Some people develop MDS after treatment for cancer or exposure to pesticides or other chemicals. But in most cases, the cause of MDS is not known.   Your doctor will use the results of blood tests to guide your treatment. There are many types of MDS, with different treatment plans for each. If you have enough red blood cells and are feeling all right, you may not need active treatment, but you and your doctor will want to watch your condition carefully. If you start feeling lightheaded and have no energy, you may need a blood transfusion. Your doctor also may give you antibiotics to prevent or treat infection. Follow-up care is a key part of your treatment and safety. Be sure to make and go to all appointments, and call your doctor if you are having problems. It's also a good idea to know your test results and keep a list of the medicines you take. How can you care for yourself at home? · Take your medicines exactly as prescribed. Call your doctor if you think you are having a problem with your medicine. You will get more details on the specific medicines your doctor prescribes. · If your doctor prescribed antibiotics, take them as directed. Do not stop taking them just because you feel better. You need to take the full course of antibiotics. If you have side effects from antibiotics, tell your doctor. · Take steps to control your stress and workload. Learn relaxation techniques. ? Share your feelings. Stress and tension affect our emotions. By expressing your feelings to others, you may be able to understand and cope with them. ? Consider joining a support group. Talking about a problem with your spouse, a good friend, or other people with similar problems is a good way to reduce tension and stress. ? Express yourself with art. Try writing, crafts, dance, or art to relieve stress. ? Be kind to your body and mind. Getting enough sleep, eating a healthy diet, and taking time to do things you enjoy can contribute to an overall feeling of balance in your life and help reduce stress. ? Get help if you need it. Discuss your concerns with your doctor or counselor. · Do not smoke. Smoking can make blood problems worse. If you need help quitting, talk to your doctor about stop-smoking programs and medicines. These can increase your chances of quitting for good. · If you have not already done so, prepare a list of advance directives. Advance directives are instructions to your doctor and family members about what kind of care you want if you become unable to speak or express yourself. · Call the [employer] (2-403.869.5970) or visit its website at 0051 [employer] for more information. When should you call for help? Call 911 anytime you think you may need emergency care. For example, call if:    · You passed out (lost consciousness).    Call your doctor now or seek immediate medical care if:    · You have a fever.     · You have abnormal bleeding.     · You have new or worse pain.     · You think you have an infection.     · You have new symptoms, such as a cough, belly pain, vomiting, diarrhea, or a rash.    Watch closely for changes in your health, and be sure to contact your doctor if:    · You are much more tired than usual.     · You have swollen glands in your armpits, groin, or neck.     · You do not get better as expected. Where can you learn more? Go to http://cleo-monika.info/. Enter Yg Fly in the search box to learn more about \"Myelodysplastic Syndromes: Care Instructions. \"  Current as of: December 19, 2018  Content Version: 12.2  © 0592-2881 [employer]. Care instructions adapted under license by [employer] (which disclaims liability or warranty for this information). If you have questions about a medical condition or this instruction, always ask your healthcare professional. [place] [address] any warranty or liability for your use of this information.

## 2020-02-07 NOTE — PROGRESS NOTES
Hematology/Oncology  Progress Note    Name: Lucho Mcginnis  Date: 2020  : 1955  PCP: Merlinda Comas, MD     Mr. Shade Redd is a 59 y.o. man with a history of early myelodysplastic syndrome and chronic anemia. Current therapy: Retacrit whenever his hemoglobin is below 10g/dL and hematocrit below 30%. Subjective:     Mr. Shade Redd is a 17-year-old man with early myelodysplastic syndrome chronic anemia. He reports he started having this problem in 2018. Today he is here in our clinic for his follow up office visit. He states he is been feeling well since last visit. He denies fatigue, tiredness, and shortness of breath. He denies chest pain and dizziness. He also denies pain or any discomfort. However he admits he is not taking his iron as prescribed. He states he only takes 1 tablet daily as opposed to 1 tablet twice daily. He states he will be more compliant this time. He is also requesting if we can check his CBC monthly and not every 2 weeks as it is very inconvenient for him. He does not have any concerns or complaints to report at this time. Past medical history, family history, and social history: these were reviewed and remains unchanged.     Past Medical History:   Diagnosis Date    Chronic kidney disease     Diabetes mellitus     Essential hypertension     Kidney disease     Prostate cancer ([facility] [address]      Past Surgical History:   Procedure Laterality Date    HX CERVICAL FUSION      HX OTHER SURGICAL      Prostate Sx r/t cancer (seed implant)    HX OTHER SURGICAL      shoulder surgery     Social History     Socioeconomic History    Marital status:      Spouse name: Not on file    Number of children: Not on file    Years of education: Not on file    Highest education level: Not on file   Occupational History    Not on file   Social Needs    Financial resource strain: Not on file    Food insecurity:     Worry: Not on file     Inability: Not on file   Haptik needs: Medical: Not on file     Non-medical: Not on file   Tobacco Use    Smoking status: Former Smoker     Packs/day: 0.25    Smokeless tobacco: Never Used    Tobacco comment: Patient quit in 2011, and started back. Light Smoker now   Substance and Sexual Activity    Alcohol use: No     Alcohol/week: 0.0 standard drinks    Drug use: No    Sexual activity: Not on file   Lifestyle    Physical activity:     Days per week: Not on file     Minutes per session: Not on file    Stress: Not on file   Relationships    Social connections:     Talks on phone: Not on file     Gets together: Not on file     Attends Orthodox service: Not on file     Active member of club or organization: Not on file     Attends meetings of clubs or organizations: Not on file     Relationship status: Not on file    Intimate partner violence:     Fear of current or ex partner: Not on file     Emotionally abused: Not on file     Physically abused: Not on file     Forced sexual activity: Not on file   Other Topics Concern    Not on file   Social History Narrative    Not on file     Family History   Problem Relation Age of Onset    Diabetes Father     No Known Problems Brother     Other Brother         PE     Current Outpatient Medications   Medication Sig Dispense Refill    insulin aspart protamine/insulin aspart (NOVOLOG MIX 70-30FLEXPEN U-100) 100 unit/mL (70-30) inpn 12 Units by SubCUTAneous route Before breakfast, lunch, and dinner.  ARIPiprazole (ABILIFY) 10 mg tablet Take 10 mg by mouth daily.  zolpidem (AMBIEN) 5 mg tablet Take  by mouth nightly as needed for Sleep.  acetaminophen (TYLENOL) 500 mg tablet Take 500 mg by mouth every six (6) hours as needed for Pain.  rosuvastatin (CRESTOR) 40 mg tablet Take 40 mg by mouth nightly.  hydroCHLOROthiazide 25 mg tab 25 mg, lisinopril 20 mg tab 20 mg Take  by mouth daily.  HCTZ 12.5 mg / Lisinopril 20 mg, PO Daily      venlafaxine (EFFEXOR) 75 mg tablet Take 75 mg by mouth daily.  multivitamin (ONE A DAY) tablet Take 1 Tab by mouth daily.  LORazepam (ATIVAN) 1 mg tablet Take 1 mg by mouth every twelve (12) hours as needed for Anxiety.  aspirin 81 mg chewable tablet Take 81 mg by mouth daily.  cyclobenzaprine (FLEXERIL) 10 mg tablet Take 1 Tab by mouth three (3) times daily as needed for Muscle Spasm(s). 60 Tab 0    metFORMIN (GLUCOPHAGE) 1,000 mg tablet Take 1,000 mg by mouth two (2) times daily (with meals).  diclofenac EC (VOLTAREN) 75 mg EC tablet Take 1 Tab by mouth two (2) times daily (with meals). 60 Tab 2    donepezil (ARICEPT) 10 mg tablet Take 10 mg by mouth nightly.  LUNESTA 3 mg tablet Take 3 mg by mouth nightly. Review of Systems  Constitutional: The patient has no acute distress or discomfort. HEENT: The patient denies recent head trauma, eye pain, blurred vision,  hearing deficit, oropharyngeal mucosal pain or lesions, and the patient denies throat pain or discomfort. Lymphatics: The patient denies palpable peripheral lymphadenopathy. Hematologic: The patient denies having bruising, bleeding, or progressive fatigue. Respiratory: Patient denies having shortness of breath, cough, sputum production, fever, or dyspnea on exertion. Cardiovascular: The patient denies having leg pain, leg swelling, heart palpitations, chest permit, chest pain, or lightheadedness. The patient denies having dyspnea on exertion. Gastrointestinal: The patient denies having nausea, emesis, or diarrhea. The patient denies having any hematemesis or blood in the stool. Genitourinary: Patient denies having urinary urgency, frequency, or dysuria. The patient denies having blood in the urine. Psychological: The patient denies having symptoms of nervousness, anxiety, depression, or thoughts of harming self. Skin: Patient denies having skin rashes, skin, ulcerations, or unexplained itching or pruritus.   Musculoskeletal: The patient denies having pain in the joints or bones. Objective:     Visit Vitals  /62   Pulse 76   Temp 98.4 °F (36.9 °C) (Oral)   Resp 16   Ht 5' 9\" (1.753 m)   Wt 90.7 kg (200 lb)   SpO2 99%   BMI 29.53 kg/m²     ECOG PS=0    Physical Exam:   Gen. Appearance: The patient is in no acute distress. Skin: There is no bruise or rash. HEENT: The exam is unremarkable. Neck: Supple without lymphadenopathy or thyromegaly. Lungs: Clear to auscultation and percussion; there are no wheezes or rhonchi. Heart: Regular rate and rhythm; there are no murmurs, gallops, or rubs. Abdomen: Bowel sounds are present and normal.  There is no guarding, tenderness, or hepatosplenomegaly. Extremities: There is no clubbing, cyanosis, or edema. Neurologic: There are no focal neurologic deficits. Lymphatics: There is no palpable peripheral lymphadenopathy. Musculoskeletal: The patient has full range of motion at all joints. There is no evidence of joint deformity or effusions. There is no focal joint tenderness. Psychological/psychiatric: There is no clinical evidence of anxiety, depression, or melancholy. Lab data:      Results for orders placed or performed during the hospital encounter of 02/07/20   CBC WITH 3 PART DIFF     Status: Abnormal   Result Value Ref Range Status    WBC 4.2 (L) 4.5 - 13.0 K/uL Final    RBC 3.43 (L) 4.10 - 5.10 M/uL Final    HGB 10.4 (L) 12.0 - 16.0 g/dL Final    HCT 31.0 (L) 36 - 48 % Final    MCV 90.4 78 - 102 FL Final    MCH 30.3 25.0 - 35.0 PG Final    MCHC 33.5 31 - 37 g/dL Final    RDW 13.9 11.5 - 14.5 % Final    PLATELET 226 001 - 014 K/uL Final    NEUTROPHILS 62 40 - 70 % Final    MIXED CELLS 8 0.1 - 17 % Final    LYMPHOCYTES 30 14 - 44 % Final    ABS. NEUTROPHILS 2.6 1.8 - 9.5 K/UL Final    ABS. MIXED CELLS 0.3 0.0 - 2.3 K/uL Final    ABS.  LYMPHOCYTES 1.3 1.1 - 5.9 K/UL Final     Comment: Test performed at [address] [place] [address] or [address] Location. Reviewed by Medical Director. DF AUTOMATED   Final         Assessment:     1. MDS (myelodysplastic syndrome) ([facility] [address]    2. Chronic anemia      Plan:   Chronic anemia/iron deficiency anemia secondary to inadequate iron intake: I have explained to the patient that today his most recent CBC showed his hemoglobin was 10.4 g/dL with hematocrit of 31 %. On 1/17/2020 his ferritin level was 148 NG/ML, Iron at 79 with Iron % saturation of 2 %. He was advised to take his iron pill  twice daily. Iron Profile and ferritin level will be obtained at this time. We will check his CBC on a monthly basis as requested. Retacrit will be provided whenever his hemoglobin declines below 10g/dL and hematocrit below 30%. Early myelodysplastic syndrome: I have explained to the patient that today his most recent CBC showed his WBC was 4.2 k/uL, hemoglobin was 10.4 g/dL with hematocrit of 31 % and his platelet count is normal at 219,000. On his most recent ferritin from 17 2020 was normal at 148 ng/mL. We will check his CBC on a monthly basis as requested. Retacrit will be provided whenever his hemoglobin declines below 10g/dL and hematocrit below 30%. Iron Profile and ferritin level will be obtained along with CMP. Follow-up in 3 months or sooner if indicated.   Orders Placed This Encounter    COMPLETE CBC & AUTO DIFF WBC    InHouse CBC ([employer])     Standing Status:   Future     Number of Occurrences:   1     Standing Expiration Date:   2/14/2020       Jones Jackson MD  2/7/2020

## 2020-02-10 LAB
ALBUMIN SERPL-MCNC: 4.1 G/DL (ref 3.8–4.8)
ALBUMIN/GLOB SERPL: 1.6 {RATIO} (ref 1.2–2.2)
ALP SERPL-CCNC: 103 IU/L (ref 39–117)
ALT SERPL-CCNC: 10 IU/L (ref 0–44)
AST SERPL-CCNC: 19 IU/L (ref 0–40)
BILIRUB SERPL-MCNC: <0.2 MG/DL (ref 0–1.2)
BUN SERPL-MCNC: 19 MG/DL (ref 8–27)
BUN/CREAT SERPL: 18 (ref 10–24)
CALCIUM SERPL-MCNC: 8.9 MG/DL (ref 8.6–10.2)
CHLORIDE SERPL-SCNC: 101 MMOL/L (ref 96–106)
CO2 SERPL-SCNC: 23 MMOL/L (ref 20–29)
CREAT SERPL-MCNC: 1.03 MG/DL (ref 0.76–1.27)
FERRITIN SERPL-MCNC: 121 NG/ML (ref 30–400)
GLOBULIN SER CALC-MCNC: 2.6 G/DL (ref 1.5–4.5)
GLUCOSE SERPL-MCNC: 179 MG/DL (ref 65–99)
IRON SATN MFR SERPL: 31 % (ref 15–55)
IRON SERPL-MCNC: 97 UG/DL (ref 38–169)
POTASSIUM SERPL-SCNC: 4.7 MMOL/L (ref 3.5–5.2)
PROT SERPL-MCNC: 6.7 G/DL (ref 6–8.5)
SODIUM SERPL-SCNC: 137 MMOL/L (ref 134–144)
SPECIMEN STATUS REPORT, ROLRST: NORMAL
TIBC SERPL-MCNC: 312 UG/DL (ref 250–450)
UIBC SERPL-MCNC: 215 UG/DL (ref 111–343)

## 2020-02-19 RX ORDER — HEPARIN 100 UNIT/ML
300-500 SYRINGE INTRAVENOUS AS NEEDED
Status: CANCELLED
Start: 2020-02-19

## 2020-02-19 RX ORDER — SODIUM CHLORIDE 0.9 % (FLUSH) 0.9 %
10 SYRINGE (ML) INJECTION AS NEEDED
Status: CANCELLED
Start: 2020-02-19

## 2020-02-19 RX ORDER — SODIUM CHLORIDE 9 MG/ML
10 INJECTION INTRAMUSCULAR; INTRAVENOUS; SUBCUTANEOUS AS NEEDED
Status: CANCELLED | OUTPATIENT
Start: 2020-02-19

## 2020-02-21 ENCOUNTER — CLINICAL SUPPORT (OUTPATIENT)
Dept: ONCOLOGY | Age: 65
End: 2020-02-21

## 2020-02-21 ENCOUNTER — HOSPITAL ENCOUNTER (OUTPATIENT)
Dept: ONCOLOGY | Age: 65
Discharge: HOME | End: 2020-02-21

## 2020-02-21 ENCOUNTER — HOSPITAL ENCOUNTER (OUTPATIENT)
Dept: INFUSION THERAPY | Age: 65
Discharge: HOME | End: 2020-02-21
Payer: MEDICARE

## 2020-02-21 VITALS
HEART RATE: 76 BPM | RESPIRATION RATE: 16 BRPM | SYSTOLIC BLOOD PRESSURE: 130 MMHG | DIASTOLIC BLOOD PRESSURE: 68 MMHG | TEMPERATURE: 97.8 F | OXYGEN SATURATION: 97 %

## 2020-02-21 DIAGNOSIS — D46.9 MDS (MYELODYSPLASTIC SYNDROME) (HCC): ICD-10-CM

## 2020-02-21 DIAGNOSIS — D46.9 MDS (MYELODYSPLASTIC SYNDROME) (HCC): Primary | ICD-10-CM

## 2020-02-21 LAB
BASO+EOS+MONOS # BLD AUTO: 0.4 K/UL (ref 0–2.3)
BASO+EOS+MONOS NFR BLD AUTO: 9 % (ref 0.1–17)
DIFFERENTIAL METHOD BLD: ABNORMAL
ERYTHROCYTE [DISTWIDTH] IN BLOOD BY AUTOMATED COUNT: 13.7 % (ref 11.5–14.5)
HCT VFR BLD AUTO: 31.9 % (ref 36–48)
HGB BLD-MCNC: 10.9 G/DL (ref 12–16)
LYMPHOCYTES # BLD: 1.5 K/UL (ref 1.1–5.9)
LYMPHOCYTES NFR BLD: 31 % (ref 14–44)
MCH RBC QN AUTO: 31.3 PG (ref 25–35)
MCHC RBC AUTO-ENTMCNC: 34.2 G/DL (ref 31–37)
MCV RBC AUTO: 91.7 FL (ref 78–102)
NEUTS SEG # BLD: 2.8 K/UL (ref 1.8–9.5)
NEUTS SEG NFR BLD: 60 % (ref 40–70)
PLATELET # BLD AUTO: 223 K/UL (ref 140–440)
RBC # BLD AUTO: 3.48 M/UL (ref 4.1–5.1)
WBC # BLD AUTO: 4.7 K/UL (ref 4.5–13)

## 2020-02-21 PROCEDURE — 36415 COLL VENOUS BLD VENIPUNCTURE: CPT

## 2020-02-21 RX ORDER — HEPARIN 100 UNIT/ML
300-500 SYRINGE INTRAVENOUS AS NEEDED
Status: CANCELLED
Start: 2020-03-06

## 2020-02-21 RX ORDER — SODIUM CHLORIDE 9 MG/ML
10 INJECTION INTRAMUSCULAR; INTRAVENOUS; SUBCUTANEOUS AS NEEDED
Status: CANCELLED | OUTPATIENT
Start: 2020-03-06

## 2020-02-21 RX ORDER — SODIUM CHLORIDE 0.9 % (FLUSH) 0.9 %
10 SYRINGE (ML) INJECTION AS NEEDED
Status: CANCELLED
Start: 2020-03-06

## 2020-03-20 ENCOUNTER — CLINICAL SUPPORT (OUTPATIENT)
Dept: ONCOLOGY | Age: 65
End: 2020-03-20

## 2020-03-20 ENCOUNTER — HOSPITAL ENCOUNTER (OUTPATIENT)
Dept: INFUSION THERAPY | Age: 65
Discharge: HOME | End: 2020-03-20
Payer: MEDICARE

## 2020-03-20 ENCOUNTER — HOSPITAL ENCOUNTER (OUTPATIENT)
Dept: ONCOLOGY | Age: 65
Discharge: HOME | End: 2020-03-20

## 2020-03-20 VITALS
RESPIRATION RATE: 20 BRPM | HEART RATE: 77 BPM | SYSTOLIC BLOOD PRESSURE: 128 MMHG | TEMPERATURE: 98 F | DIASTOLIC BLOOD PRESSURE: 79 MMHG | OXYGEN SATURATION: 97 %

## 2020-03-20 DIAGNOSIS — D46.9 MDS (MYELODYSPLASTIC SYNDROME) (HCC): Primary | ICD-10-CM

## 2020-03-20 DIAGNOSIS — D46.9 MDS (MYELODYSPLASTIC SYNDROME) (HCC): ICD-10-CM

## 2020-03-20 LAB
BASO+EOS+MONOS # BLD AUTO: 0.4 K/UL (ref 0–2.3)
BASO+EOS+MONOS NFR BLD AUTO: 8 % (ref 0.1–17)
DIFFERENTIAL METHOD BLD: ABNORMAL
ERYTHROCYTE [DISTWIDTH] IN BLOOD BY AUTOMATED COUNT: 12.8 % (ref 11.5–14.5)
HCT VFR BLD AUTO: 36 % (ref 36–48)
HGB BLD-MCNC: 12.2 G/DL (ref 12–16)
LYMPHOCYTES # BLD: 2 K/UL (ref 1.1–5.9)
LYMPHOCYTES NFR BLD: 38 % (ref 14–44)
MCH RBC QN AUTO: 30.7 PG (ref 25–35)
MCHC RBC AUTO-ENTMCNC: 33.9 G/DL (ref 31–37)
MCV RBC AUTO: 90.7 FL (ref 78–102)
NEUTS SEG # BLD: 3 K/UL (ref 1.8–9.5)
NEUTS SEG NFR BLD: 55 % (ref 40–70)
PLATELET # BLD AUTO: 288 K/UL (ref 140–440)
RBC # BLD AUTO: 3.97 M/UL (ref 4.1–5.1)
WBC # BLD AUTO: 5.4 K/UL (ref 4.5–13)

## 2020-03-20 PROCEDURE — 36415 COLL VENOUS BLD VENIPUNCTURE: CPT

## 2020-03-20 NOTE — PROGRESS NOTES
[facility] Progress Note    Date: 2020    Name: Abdelrahman Estrella    MRN: [id number]         : 1955       Mr. Pierre arrived in the [facility] today at 1300, in stable condition, here for Q 4 Week, CBC/Retacrit injection. He was assessed and education was provided. Mr. Clarita Dawson vitals were reviewed. Visit Vitals  /79 (BP 1 Location: Left arm, BP Patient Position: Sitting)   Pulse 77   Temp 98 °F (36.7 °C)   Resp 20   SpO2 97%           Blood was drawn for a CBC, from his left AC at 1324, without incident. Lab results were obtained and reviewed, and were as follows:      Recent Results (from the past 12 hour(s))   CBC WITH 3 PART DIFF    Collection Time: 20  1:24 PM   Result Value Ref Range    WBC 5.4 4.5 - 13.0 K/uL    RBC 3.97 (L) 4.10 - 5.10 M/uL    HGB 12.2 12.0 - 16.0 g/dL    HCT 36.0 36 - 48 %    MCV 90.7 78 - 102 FL    MCH 30.7 25.0 - 35.0 PG    MCHC 33.9 31 - 37 g/dL    RDW 12.8 11.5 - 14.5 %    PLATELET 256 907 - 231 K/uL    NEUTROPHILS 55 40 - 70 %    MIXED CELLS 8 0.1 - 17 %    LYMPHOCYTES 38 14 - 44 %    ABS. NEUTROPHILS 3.0 1.8 - 9.5 K/UL    ABS. MIXED CELLS 0.4 0.0 - 2.3 K/uL    ABS. LYMPHOCYTES 2.0 1.1 - 5.9 K/UL    DF AUTOMATED               Retacrit (epoetin-suhas-epbx) injection was HELD today, per order. Mr. Coty Gurrola tolerated well, and had no complaints. Mr. Coty Gurrola was discharged from [place] [address] in stable condition at 1335. [place] He is to return in 4 weeks, on Friday, 20  at 0900, for his next appointment, for CBC/Retacrit injection.      Obed Melgar RN  2020  1:38 PM

## 2020-03-20 NOTE — PROGRESS NOTES
[facility] Progress Note    Date: 2020    Name: Scarlet Sandoval    MRN: [id number]         : 1955        Mr. Pierre did NOT show up today, Friday, 3-20-20, for his scheduled 0930 [facility] appointment, for Q 4 Week, CBC/Retacrit Injection. So, I called him regarding the missed appointment, on both his house & cell phone numbers, but received no answer on either number. However, I did leave him a message on both voicemail boxes, making him aware of the missed appointment, and asking him to call ASAP regarding the missed appointment. For now though, unless we hear from him otherwise, his next [facility] appointment is scheduled in 4 weeks, on Friday, 20 at 0900.           Destini Arteaga RN  2020  10:52 AM

## 2020-04-17 ENCOUNTER — HOSPITAL ENCOUNTER (OUTPATIENT)
Dept: INFUSION THERAPY | Age: 65
End: 2020-04-17

## 2020-05-01 ENCOUNTER — HOSPITAL ENCOUNTER (OUTPATIENT)
Dept: INFUSION THERAPY | Age: 65
Discharge: HOME | End: 2020-05-01
Payer: MEDICARE

## 2020-05-01 VITALS
DIASTOLIC BLOOD PRESSURE: 73 MMHG | SYSTOLIC BLOOD PRESSURE: 152 MMHG | RESPIRATION RATE: 18 BRPM | TEMPERATURE: 97.7 F | HEART RATE: 94 BPM | OXYGEN SATURATION: 94 %

## 2020-05-01 DIAGNOSIS — D46.9 MDS (MYELODYSPLASTIC SYNDROME) (HCC): Primary | ICD-10-CM

## 2020-05-01 DIAGNOSIS — D64.9 CHRONIC ANEMIA: ICD-10-CM

## 2020-05-01 DIAGNOSIS — D50.8 IRON DEFICIENCY ANEMIA SECONDARY TO INADEQUATE DIETARY IRON INTAKE: ICD-10-CM

## 2020-05-01 LAB
BASO+EOS+MONOS # BLD AUTO: 0.5 K/UL (ref 0–2.3)
BASO+EOS+MONOS NFR BLD AUTO: 9 % (ref 0.1–17)
DIFFERENTIAL METHOD BLD: ABNORMAL
ERYTHROCYTE [DISTWIDTH] IN BLOOD BY AUTOMATED COUNT: 12.4 % (ref 11.5–14.5)
HCT VFR BLD AUTO: 34.9 % (ref 36–48)
HGB BLD-MCNC: 11.7 G/DL (ref 12–16)
LYMPHOCYTES # BLD: 1.6 K/UL (ref 1.1–5.9)
LYMPHOCYTES NFR BLD: 32 % (ref 14–44)
MCH RBC QN AUTO: 29.8 PG (ref 25–35)
MCHC RBC AUTO-ENTMCNC: 33.5 G/DL (ref 31–37)
MCV RBC AUTO: 88.8 FL (ref 78–102)
NEUTS SEG # BLD: 2.8 K/UL (ref 1.8–9.5)
NEUTS SEG NFR BLD: 59 % (ref 40–70)
PLATELET # BLD AUTO: 201 K/UL (ref 140–440)
RBC # BLD AUTO: 3.93 M/UL (ref 4.1–5.1)
WBC # BLD AUTO: 4.9 K/UL (ref 4.5–13)

## 2020-05-01 PROCEDURE — 85025 COMPLETE CBC W/AUTO DIFF WBC: CPT

## 2020-05-01 PROCEDURE — 36415 COLL VENOUS BLD VENIPUNCTURE: CPT

## 2020-05-01 RX ORDER — HEPARIN 100 UNIT/ML
300-500 SYRINGE INTRAVENOUS AS NEEDED
Status: CANCELLED
Start: 2020-05-15

## 2020-05-01 RX ORDER — SODIUM CHLORIDE 9 MG/ML
10 INJECTION INTRAMUSCULAR; INTRAVENOUS; SUBCUTANEOUS AS NEEDED
Status: CANCELLED | OUTPATIENT
Start: 2020-05-15

## 2020-05-01 RX ORDER — SODIUM CHLORIDE 0.9 % (FLUSH) 0.9 %
10 SYRINGE (ML) INJECTION AS NEEDED
Status: CANCELLED
Start: 2020-05-15

## 2020-05-01 NOTE — PROGRESS NOTES
[facility] Progress Note    Date: May 1, 2020    Name: Katarina Perez    MRN: [id number]         : 1955      Mr. Pierre arrived in the [facility] today at 1510, in stable condition, here for Q 4 Week, CBC/Retacrit injection. He was assessed and education was provided. Reports fatigue, but no pain. Mr. Radha Broussard vitals were reviewed. Visit Vitals  /73 (BP 1 Location: Left arm, BP Patient Position: Sitting)   Pulse 94   Temp 97.7 °F (36.5 °C)   Resp 18   SpO2 94%       Blood was drawn for a CBC, from his left AC x 1 attempt     Lab results were obtained and reviewed. Recent Results (from the past 12 hour(s))   CBC WITH 3 PART DIFF    Collection Time: 20  3:30 PM   Result Value Ref Range    WBC 4.9 4.5 - 13.0 K/uL    RBC 3.93 (L) 4.10 - 5.10 M/uL    HGB 11.7 (L) 12.0 - 16 g/dL    HCT 34.9 (L) 36 - 48 %    MCV 88.8 78 - 102 FL    MCH 29.8 25.0 - 35.0 PG    MCHC 33.5 31 - 37 g/dL    RDW 12.4 11.5 - 14.5 %    PLATELET 883 821 - 022 K/uL    NEUTROPHILS 59 40 - 70 %    MIXED CELLS 9 0.1 - 17 %    LYMPHOCYTES 32 14 - 44 %    ABS. NEUTROPHILS 2.8 1.8 - 9.5 K/UL    ABS. MIXED CELLS 0.5 0.0 - 2.3 K/uL    ABS. LYMPHOCYTES 1.6 1.1 - 5.9 K/UL    DF AUTOMATED         Retacrit (epoetin-suhas-epbx) injection was HELD today, per order. Mr. Idalia Can tolerated te-puncture well, and had no complaints. Mr. Idalia Can was discharged from [place] [address] in stable condition at 1530. He is to return in 2 weeks, on Friday, 20,  at 1500,  for his next appointment, for CBC/Retacrit injection.      Meghan Lopez RN  May 1, 2020  1:38 PM

## 2020-05-15 ENCOUNTER — APPOINTMENT (OUTPATIENT)
Dept: INFUSION THERAPY | Age: 65
End: 2020-05-15

## 2020-05-21 ENCOUNTER — VIRTUAL VISIT (OUTPATIENT)
Dept: ONCOLOGY | Age: 65
End: 2020-05-21

## 2020-05-21 VITALS — BODY MASS INDEX: 29.53 KG/M2 | HEIGHT: 69 IN

## 2020-05-21 DIAGNOSIS — D64.9 CHRONIC ANEMIA: ICD-10-CM

## 2020-05-21 DIAGNOSIS — D46.9 MDS (MYELODYSPLASTIC SYNDROME) (HCC): Primary | ICD-10-CM

## 2020-05-21 NOTE — PROGRESS NOTES
Doug Johns is a 59 y.o. male evaluated via audio only technology on 5/21/2020. Consent: He and/or his health care decision maker is aware that he may receive a bill for this audio only encounter, depending on his insurance coverage, and has provided verbal consent to proceed: Yes    Attending: Dr. Pastora Gutierrez:     1. MDS (myelodysplastic syndrome) ([facility] [address]  *On 05/01/2020 his CBC showed a WBC count of 4.9K/uL, hemoglobin was 11.7g/dL, hematocrit 34.9%, and the platelet count was 787,552. *On 02/10/2020 his Ferritin level was 121ng/mL. *His Iron profile showed an iron level of 97ug/dL and Iron % saturation of 31%. *Retacrit will be provided whenever his hemoglobin declines below 10g/dL and hematocrit below 30%. *Continue CBC monthly  *Follow up in 3 months or sooner if indicated  *Labs prior to next appointment   - IRON PROFILE; Future   - METABOLIC PANEL, COMPREHENSIVE; Future   - FERRITIN; Future   - CBC WITH 3 PART DIFF; Future    2. Chronic anemia  *On 05/01/2020 his CBC showed a WBC count of 4.9K/uL, hemoglobin was 11.7g/dL, hematocrit 34.9%, and the platelet count was 897,554. On 02/10/2020 his Ferritin level was 121ng/mL. *His Iron profile showed an iron level of 97ug/dL and Iron % saturation of 31%. *The kidney function was normal with a BUN of 19mg/dL and a creatinine of 1.03mg/dL. *Follow up in 3 months or sooner if indicated  *Labs prior to next appointment   - [address]; Future   - METABOLIC PANEL, COMPREHENSIVE; Future   - FERRITIN; Future   - CBC WITH 3 PART DIFF; Future  12  Subjective:   Doug Johns is a 59 y.o. male who was evaluated via audio only technology. I communicated with the patient and/or health care decision maker about the ongoing management of his early MDS and iron deficiency anemia.  I informed the patient that at his last clinic visit on 05/01/2020 his CBC showed a WBC count of 4.9K/uL, hemoglobin was 11.7g/dL, hematocrit 34.9%, and the platelet count was 201,000. On 02/10/2020 his Ferritin level was 121ng/mL. His Iron profile showed an iron level of 97ug/dL and Iron % saturation of 31%. The kidney function was normal with a BUN of 19mg/dL and a creatinine of 1.03mg/dL. Overall the patient reports that he is doing well. He denies shortness of breath, weakness, and fatigue. He denies fevers, infections, and weight loss. He denies pain or any discomfort. He has no new complaints or concerns to report. The patient had his questions answered to his satisfaction. Follow up in 3 months or sooner if indicated. Prior to Admission medications    Medication Sig Start Date End Date Taking? Authorizing Provider   insulin aspart protamine/insulin aspart (NOVOLOG MIX 70-30FLEXPEN U-100) 100 unit/mL (70-30) inpn 12 Units by SubCUTAneous route Before breakfast, lunch, and dinner. Provider, Historical   ARIPiprazole (ABILIFY) 10 mg tablet Take 10 mg by mouth daily. Provider, Historical   zolpidem (AMBIEN) 5 mg tablet Take  by mouth nightly as needed for Sleep. Provider, Historical   acetaminophen (TYLENOL) 500 mg tablet Take 500 mg by mouth every six (6) hours as needed for Pain. Provider, Historical   rosuvastatin (CRESTOR) 40 mg tablet Take 40 mg by mouth nightly. Provider, Historical   hydroCHLOROthiazide 25 mg tab 25 mg, lisinopril 20 mg tab 20 mg Take  by mouth daily. HCTZ 12.5 mg / Lisinopril 20 mg, PO Daily    Provider, Historical   venlafaxine (EFFEXOR) 75 mg tablet Take 75 mg by mouth daily. Provider, Historical   multivitamin (ONE A DAY) tablet Take 1 Tab by mouth daily. Provider, Historical   LORazepam (ATIVAN) 1 mg tablet Take 1 mg by mouth every twelve (12) hours as needed for Anxiety. Provider, Historical   aspirin 81 mg chewable tablet Take 81 mg by mouth daily. Provider, Historical   cyclobenzaprine (FLEXERIL) 10 mg tablet Take 1 Tab by mouth three (3) times daily as needed for Muscle Spasm(s).  11/29/16   MAYRA Su metFORMIN (GLUCOPHAGE) 1,000 mg tablet Take 1,000 mg by mouth two (2) times daily (with meals). Provider, Historical   diclofenac EC (VOLTAREN) 75 mg EC tablet Take 1 Tab by mouth two (2) times daily (with meals). 4/12/16   Sherine Garcia MD   donepezil (ARICEPT) 10 mg tablet Take 10 mg by mouth nightly. 9/19/11   Provider, Historical   LUNESTA 3 mg tablet Take 3 mg by mouth nightly. 9/18/11   Provider, Historical     No Known Allergies    Lab Results   Component Value Date/Time    WBC 4.9 05/01/2020 03:30 PM    HGB 11.7 (L) 05/01/2020 03:30 PM    HCT 34.9 (L) 05/01/2020 03:30 PM    PLATELET 997 20/04/5543 03:30 PM    MCV 88.8 05/01/2020 03:30 PM     Lab Results   Component Value Date/Time    Sodium 137 02/07/2020 01:27 AM    Potassium 4.7 02/07/2020 01:27 AM    Chloride 101 02/07/2020 01:27 AM    CO2 23 02/07/2020 01:27 AM    Anion gap 6 04/12/2019 10:49 AM    Glucose 179 (H) 02/07/2020 01:27 AM    BUN 19 02/07/2020 01:27 AM    Creatinine 1.03 02/07/2020 01:27 AM    BUN/Creatinine ratio 18 02/07/2020 01:27 AM    GFR est AA 88 02/07/2020 01:27 AM    GFR est non-AA 76 02/07/2020 01:27 AM    Calcium 8.9 02/07/2020 01:27 AM    Bilirubin, total <0.2 02/07/2020 01:27 AM    AST (SGOT) 19 02/07/2020 01:27 AM    Alk. phosphatase 103 02/07/2020 01:27 AM    Protein, total 6.7 02/07/2020 01:27 AM    Albumin 4.1 02/07/2020 01:27 AM    Globulin 3.3 04/12/2019 10:49 AM    A-G Ratio 1.6 02/07/2020 01:27 AM    ALT (SGPT) 10 02/07/2020 01:27 AM     Lab Results   Component Value Date/Time    Iron 97 02/07/2020 01:27 AM    TIBC 312 02/07/2020 01:27 AM    Iron % saturation 31 02/07/2020 01:27 AM    Ferritin 121 02/07/2020 01:27 AM       I affirm this is a Patient-Initiated Episode with a Patient who has not had a related appointment within my department in the past 7 days or scheduled within the next 24 hours.     Total Time: minutes: 11-20 minutes   Follow up with Dr. Radha Joaquin in 3 months or sooner if indicated  Labs prior to appointment    Orders Placed This Encounter    FERRITIN     Standing Status:   Future     Standing Expiration Date:   5/21/2021    CBC WITH 3 PART DIFF     Standing Status:   Future     Standing Expiration Date:   11/21/2020    IRON PROFILE     Standing Status:   Future     Standing Expiration Date:   2/41/3302    METABOLIC PANEL, COMPREHENSIVE     Standing Status:   Future     Standing Expiration Date:   5/22/2021         [facility]  05/21/2020

## 2020-05-29 ENCOUNTER — HOSPITAL ENCOUNTER (OUTPATIENT)
Dept: INFUSION THERAPY | Age: 65
End: 2020-05-29
Payer: MEDICARE

## 2020-06-12 ENCOUNTER — APPOINTMENT (OUTPATIENT)
Dept: INFUSION THERAPY | Age: 65
End: 2020-06-12

## 2020-06-26 ENCOUNTER — APPOINTMENT (OUTPATIENT)
Dept: INFUSION THERAPY | Age: 65
End: 2020-06-26

## 2020-07-10 ENCOUNTER — APPOINTMENT (OUTPATIENT)
Dept: INFUSION THERAPY | Age: 65
End: 2020-07-10
Payer: MEDICARE

## 2020-10-09 ENCOUNTER — OFFICE VISIT (OUTPATIENT)
Dept: ONCOLOGY | Age: 65
End: 2020-10-09
Payer: MEDICARE

## 2020-10-09 ENCOUNTER — HOSPITAL ENCOUNTER (OUTPATIENT)
Dept: LAB | Age: 65
Discharge: HOME | End: 2020-10-09
Payer: MEDICARE

## 2020-10-09 VITALS
BODY MASS INDEX: 29.98 KG/M2 | DIASTOLIC BLOOD PRESSURE: 72 MMHG | HEART RATE: 72 BPM | WEIGHT: 203 LBS | TEMPERATURE: 98.9 F | RESPIRATION RATE: 16 BRPM | SYSTOLIC BLOOD PRESSURE: 133 MMHG | OXYGEN SATURATION: 97 %

## 2020-10-09 DIAGNOSIS — D46.9 MDS (MYELODYSPLASTIC SYNDROME) (HCC): ICD-10-CM

## 2020-10-09 DIAGNOSIS — D50.8 IRON DEFICIENCY ANEMIA SECONDARY TO INADEQUATE DIETARY IRON INTAKE: ICD-10-CM

## 2020-10-09 DIAGNOSIS — D64.9 CHRONIC ANEMIA: Primary | ICD-10-CM

## 2020-10-09 DIAGNOSIS — D64.9 CHRONIC ANEMIA: ICD-10-CM

## 2020-10-09 LAB
ALBUMIN SERPL-MCNC: 3.5 G/DL (ref 3.4–5)
ALBUMIN/GLOB SERPL: 0.9 {RATIO} (ref 0.8–1.7)
ALP SERPL-CCNC: 124 U/L (ref 45–117)
ALT SERPL-CCNC: 17 U/L (ref 16–61)
ANION GAP SERPL CALC-SCNC: 3 MMOL/L (ref 3–18)
AST SERPL-CCNC: 18 U/L (ref 10–38)
BASOPHILS # BLD: 0 K/UL (ref 0–0.1)
BASOPHILS NFR BLD: 0 % (ref 0–2)
BILIRUB SERPL-MCNC: 0.4 MG/DL (ref 0.2–1)
BUN SERPL-MCNC: 16 MG/DL (ref 7–18)
BUN/CREAT SERPL: 14 (ref 12–20)
CALCIUM SERPL-MCNC: 9 MG/DL (ref 8.5–10.1)
CHLORIDE SERPL-SCNC: 102 MMOL/L (ref 100–111)
CO2 SERPL-SCNC: 30 MMOL/L (ref 21–32)
CREAT SERPL-MCNC: 1.12 MG/DL (ref 0.6–1.3)
DIFFERENTIAL METHOD BLD: ABNORMAL
EOSINOPHIL # BLD: 0.1 K/UL (ref 0–0.4)
EOSINOPHIL NFR BLD: 2 % (ref 0–5)
ERYTHROCYTE [DISTWIDTH] IN BLOOD BY AUTOMATED COUNT: 13.6 % (ref 11.6–14.5)
GLOBULIN SER CALC-MCNC: 3.8 G/DL (ref 2–4)
GLUCOSE SERPL-MCNC: 314 MG/DL (ref 74–99)
HCT VFR BLD AUTO: 33.8 % (ref 36–48)
HGB BLD-MCNC: 10.9 G/DL (ref 13–16)
LYMPHOCYTES # BLD: 1.5 K/UL (ref 0.9–3.6)
LYMPHOCYTES NFR BLD: 28 % (ref 21–52)
MCH RBC QN AUTO: 29.8 PG (ref 24–34)
MCHC RBC AUTO-ENTMCNC: 32.2 G/DL (ref 31–37)
MCV RBC AUTO: 92.3 FL (ref 74–97)
MONOCYTES # BLD: 0.4 K/UL (ref 0.05–1.2)
MONOCYTES NFR BLD: 8 % (ref 3–10)
NEUTS SEG # BLD: 3.3 K/UL (ref 1.8–8)
NEUTS SEG NFR BLD: 62 % (ref 40–73)
PLATELET # BLD AUTO: 290 K/UL (ref 135–420)
PMV BLD AUTO: 9 FL (ref 9.2–11.8)
POTASSIUM SERPL-SCNC: 5.6 MMOL/L (ref 3.5–5.5)
PROT SERPL-MCNC: 7.3 G/DL (ref 6.4–8.2)
RBC # BLD AUTO: 3.66 M/UL (ref 4.7–5.5)
SODIUM SERPL-SCNC: 135 MMOL/L (ref 136–145)
WBC # BLD AUTO: 5.3 K/UL (ref 4.6–13.2)

## 2020-10-09 PROCEDURE — 80053 COMPREHEN METABOLIC PANEL: CPT

## 2020-10-09 PROCEDURE — 36415 COLL VENOUS BLD VENIPUNCTURE: CPT

## 2020-10-09 PROCEDURE — 99214 OFFICE O/P EST MOD 30 MIN: CPT | Performed by: INTERNAL MEDICINE

## 2020-10-09 PROCEDURE — 85025 COMPLETE CBC W/AUTO DIFF WBC: CPT

## 2020-10-09 NOTE — PROGRESS NOTES
Hematology/Oncology  Progress Note    Name: Jasmin Jamison      : 1955  PCP: Jennifer Day MD     Mr. Karsten Jose is a 72 y.o. man with a history of presumably early myelodysplastic syndrome and chronic anemia. Subjective:     Mr. Karsten Jose is a 80-year-old man with possible early myelodysplastic syndrome chronic anemia. He reports he started having this problem in 2018. Today he is here in our clinic for his follow up office visit. He states he is been feeling well since last visit. He denies fatigue, tiredness, and shortness of breath. The patient otherwise has no other complaints. Denied fever, chills, night sweat, unintentional weight loss, skin lumps or bumps, acute bleeding or bruising issues. No acute bleeding, blood in stool, dark stool, melena, hematochezia, hemoptysis, dark urine, or easily bruising. Denied headache, acute vision change, dizziness, chest pain, worsen shortness of breath, palpitation, productive cough, nausea, vomiting, abdominal pain, altered bowel habits, dysuria, new bone pain or back pain, focal numbness or weakness. Past medical history, family history, and social history: these were reviewed and remains unchanged.     Past Medical History:   Diagnosis Date    Chronic kidney disease     Diabetes mellitus     Essential hypertension     Kidney disease     Prostate cancer ([facility] [address]      Past Surgical History:   Procedure Laterality Date    HX CERVICAL FUSION      HX OTHER SURGICAL      Prostate Sx r/t cancer (seed implant)    HX OTHER SURGICAL      shoulder surgery     Social History     Socioeconomic History    Marital status:      Spouse name: Not on file    Number of children: Not on file    Years of education: Not on file    Highest education level: Not on file   Occupational History    Not on file   Social Needs    Financial resource strain: Not on file    Food insecurity     Worry: Not on file     Inability: Not on file   [place] Transportation needs     Medical: Not on file     Non-medical: Not on file   Tobacco Use    Smoking status: Former Smoker     Packs/day: 0.25    Smokeless tobacco: Never Used    Tobacco comment: Patient quit in 2011, and started back. Light Smoker now   Substance and Sexual Activity    Alcohol use: No     Alcohol/week: 0.0 standard drinks    Drug use: No    Sexual activity: Not on file   Lifestyle    Physical activity     Days per week: Not on file     Minutes per session: Not on file    Stress: Not on file   Relationships    Social connections     Talks on phone: Not on file     Gets together: Not on file     Attends Buddhist service: Not on file     Active member of club or organization: Not on file     Attends meetings of clubs or organizations: Not on file     Relationship status: Not on file    Intimate partner violence     Fear of current or ex partner: Not on file     Emotionally abused: Not on file     Physically abused: Not on file     Forced sexual activity: Not on file   Other Topics Concern    Not on file   Social History Narrative    Not on file     Family History   Problem Relation Age of Onset    Diabetes Father     No Known Problems Brother     Other Brother         PE     Current Outpatient Medications   Medication Sig Dispense Refill    insulin aspart protamine/insulin aspart (NOVOLOG MIX 70-30FLEXPEN U-100) 100 unit/mL (70-30) inpn 12 Units by SubCUTAneous route Before breakfast, lunch, and dinner.  ARIPiprazole (ABILIFY) 10 mg tablet Take 10 mg by mouth daily.  zolpidem (AMBIEN) 5 mg tablet Take  by mouth nightly as needed for Sleep.  acetaminophen (TYLENOL) 500 mg tablet Take 500 mg by mouth every six (6) hours as needed for Pain.  rosuvastatin (CRESTOR) 40 mg tablet Take 40 mg by mouth nightly.  hydroCHLOROthiazide 25 mg tab 25 mg, lisinopril 20 mg tab 20 mg Take  by mouth daily.  HCTZ 12.5 mg / Lisinopril 20 mg, PO Daily      venlafaxine (EFFEXOR) 75 mg tablet Take 75 mg by mouth daily.  multivitamin (ONE A DAY) tablet Take 1 Tab by mouth daily.  LORazepam (ATIVAN) 1 mg tablet Take 1 mg by mouth every twelve (12) hours as needed for Anxiety.  aspirin 81 mg chewable tablet Take 81 mg by mouth daily.  cyclobenzaprine (FLEXERIL) 10 mg tablet Take 1 Tab by mouth three (3) times daily as needed for Muscle Spasm(s). 60 Tab 0    diclofenac EC (VOLTAREN) 75 mg EC tablet Take 1 Tab by mouth two (2) times daily (with meals). 60 Tab 2    donepezil (ARICEPT) 10 mg tablet Take 10 mg by mouth nightly.  metFORMIN (GLUCOPHAGE) 1,000 mg tablet Take 1,000 mg by mouth two (2) times daily (with meals).  LUNESTA 3 mg tablet Take 3 mg by mouth nightly. Review of Systems   Constitutional: Negative for chills, diaphoresis, fever, malaise/fatigue and weight loss. Respiratory: Negative for cough, hemoptysis, shortness of breath and wheezing. Cardiovascular: Negative for chest pain, palpitations and leg swelling. Gastrointestinal: Negative for abdominal pain, diarrhea, heartburn, nausea and vomiting. Genitourinary: Negative for dysuria, frequency, hematuria and urgency. Musculoskeletal: Negative for joint pain and myalgias. Skin: Negative for itching and rash. Neurological: Negative for dizziness, seizures, weakness and headaches. Psychiatric/Behavioral: Negative for depression. The patient does not have insomnia.              Objective:     Visit Vitals  /72 (BP Patient Position: Sitting)   Pulse 72   Temp 98.9 °F (37.2 °C) (Oral)   Resp 16   Wt 92.1 kg (203 lb)   SpO2 97%   BMI 29.98 kg/m²       ECOG Performance Status (grade): 0  0 - able to carry on all pre-disease activity w/out restriction  1 - restricted but able to carry out light work  2 - ambulatory and can self- care but unable to carry out work  3 - bed or chair >50% of waking hours  4 - completely disable, total care, confined to bed or chair    Physical Exam  Constitutional:       General: He is not in acute distress. HENT:      Head: Normocephalic and atraumatic. Eyes:      Pupils: Pupils are equal, round, and reactive to light. Neck:      Musculoskeletal: Neck supple. No neck rigidity. Cardiovascular:      Pulses: Normal pulses. Heart sounds: Normal heart sounds. No murmur. Pulmonary:      Effort: Pulmonary effort is normal. No respiratory distress. Breath sounds: Normal breath sounds. Abdominal:      General: Bowel sounds are normal. There is no distension. Palpations: Abdomen is soft. There is no mass. Tenderness: There is no abdominal tenderness. There is no guarding. Musculoskeletal:         General: No swelling or tenderness. Lymphadenopathy:      Cervical: No cervical adenopathy. Skin:     General: Skin is warm. Findings: No rash. Neurological:      Mental Status: He is alert and oriented to person, place, and time. Mental status is at baseline. Cranial Nerves: No cranial nerve deficit. Psychiatric:         Mood and Affect: Mood normal.          Diagnostics:      No results found for this or any previous visit (from the past 96 hour(s)). Imaging:  Results for orders placed during the hospital encounter of 11/05/18   IR BX BONE MARROW DIAGNOSTIC    Narrative PREOPERATIVE DIAGNOSIS: Anemia. POSTOPERATIVE DIAGNOSIS: Same    ATTENDING: KARON Anthony: None. PROCEDURES: Fluoroscopically guided bone marrow biopsy. ANESTHESIA: Local 1% lidocaine as well as moderate intravenous sedation with  Versed and fentanyl given and monitored per independently trained interventional  radiology nurse under my direct supervision for 20 minutes. Please see nursing  records for detailed medication dosing. CONTRAST: None. COMPLICATIONS: None    DRAIN: No    CATHETER: None. EBL: Minimal.    SPECIMEN: 2 aspirates and single core biopsy was obtained.     Fluoroscopy radiation dose: Cumulative Air KERMA = 26 mGy. TECHNIQUE: After detailed explanation of risks and benefits of the procedure  verbal and written consent were obtained. Patient was brought to the  interventional radiology room and placed prone on the table. Timeout was  performed.  view was obtained. Target lesion was identified and skin was  marked overlying left iliac crest.    Left iliac crest region was prepped and draped in the usual sterile fashion. Maximum sterile there are technique was used. 1% lidocaine solution was instilled in the skin superficial and deep  subcutaneous soft tissues overlying the biopsy region. Under direct fluoroscopy guidance using 11-gauge FiscalNote biopsy needle was  advanced down through left iliac crest periosteum with drill power assistance. Single pass was made. 2 aspirates and single core biopsy were obtained. Given to  pathology on site. Postbiopsy imaging was obtained. FINDINGS: Fluoroscopic guidance demonstrated good position of the biopsy needle. Postbiopsy imaging shows no abnormality. Impression IMPRESSION:    Successful, uncomplicated fluoroscopically guided bone marrow biopsy. Results for orders placed during the hospital encounter of 06/20/16   XR FOOT LT MIN 3 V    Narrative Left Foot Three Views    CPT CODE: 67095    HISTORY: Abnormal bone scan with increased activity at the left mid foot, bone  scan performed for prostate carcinoma. COMPARISON: Bone scan same day. FINDINGS:     Three views obtained. There is narrowing with spurring involving the metatarsal  tarsal joints likely mild subchondral sclerosis and erosions at the second  metatarsal tarsal joint. Narrowing of the naviculocuneiform joints with  spurring. Healed fracture deformity of the distal fifth metatarsal.      Impression IMPRESSION:    Osteoarthrosis of the midfoot. Maureen Oliveira          Results for orders placed during the hospital encounter of 06/20/16   CT ABD PELV W CONT    Narrative CT Abdomen And Pelvis with Intravenous Contrast    INDICATION: Prostate cancer. TECHNIQUE: 5 mm collimation axial images obtained from the diaphragm to the  level of the pubic symphysis following the uneventful administration of  100 cc  of low osmolar, nonionic intravenous contrast.    COMPARISON: Unenhanced CT 12/5/2015. ABDOMEN FINDINGS:    Lung Bases: Clear. The visualized portions of the mediastinum are normal..    Liver: Normal attenuation. No evidence for mass. Gallbladder: Present and appears normal. No biliary ductal dilatation. Pancreas: Normal attenuation without mass or ductal dilatation. Spleen: Normal in size. No evidence of mass. .    Adrenal Glands: No evidence for mass. Kidneys:     Right: Normal enhancement. A posterior interpolar cyst measures 12 mm. No  hydronephrosis. Left:  Normal enhancement with several cysts measuring up to 16 mm. No  hydronephrosis. Lymph Nodes: A mildly prominent lymph node adjacent to the left common iliac  artery (image 35) measures 8 mm in longest dimension and is stable. There are no  new enlarged lymph nodes. There are no enlarged abdominal lymph nodes. Aorta is diffusely calcified but not aneurysmally dilated. PELVIS FINDINGS:     Bowel: Small Bowel: Normal in caliber with normal wall thickness. Large Bowel: Scattered diverticula are present without associated inflammation. Appendix: Not visualized and may be absent or collapsed. Bladder: Normal. No ureteral dilatation. Lymph nodes: No evidence of mesenteric or pelvic lymph node enlargement. Bones: Intramedullary noemy is in the left femur without associated fracture or  lucency to suggest loosening. There are scattered bone islands in the proximal  femurs. There are degenerative changes of the spine. There are no destructive  lesions. Impression IMPRESSION:    1. No soft tissue mass to suggest metastases. A mildly prominent  retroperitoneal lymph node is stable. No evidence of osseous metastases noting  bone scan is a more sensitive modality to detect osseous metastasis. 2.   Bilateral renal cysts. Diverticulosis coli. 3. Left hip orthopedic hardware as described above. Assessment:     1. Chronic anemia    2. MDS (myelodysplastic syndrome) ([facility])      Plan:   Chronic anemia  Early myelodysplastic syndrome:  -- Patient was being followed by Dr. Naye Jarrett who retired recently. His previous bone marrow biopsy reported mild hypocellularity, no specific features are identified within the bone marrow. He was diagnosed of presumably early MDS. -- Clinically asymptomatic. His recent CBC showed stable H/H for months. No thrombocytopenia or leukopenia noted. Likely not MDS, probably anemia of chronic disease. Will d/c Retacrit. -- We will check his CBC and CMP periodically. Prostate cancer  -- Lurpon injection q 6 months  -- He is being cared by Kee Levy Oncologist      -- We will see the patient back in clinic in about 4 months. Always sooner if required. Orders Placed This Encounter    CBC WITH AUTOMATED DIFF     Standing Status:   Future     Number of Occurrences:   1     Standing Expiration Date:   58/50/7165    METABOLIC PANEL, COMPREHENSIVE     Standing Status:   Future     Number of Occurrences:   1     Standing Expiration Date:   10/10/2021           Mr. Tati Irwin has a reminder for a \"due or due soon\" health maintenance. I have asked that he contact his primary care provider for follow-up on this health maintenance. All of patient's questions answered to their apparent satisfaction. They verbally show understanding and agreement with aforementioned plan. Loki Granger MD  10/9/2020          About 25 minutes were spent for this encounter with more than 50% of the time spent in face-to-face counseling, discussing on diagnosis and management plan going forward, and co-ordination of care.   Parts of this document has been produced using Dragon dictation system. Unrecognized errors in transcription may be present. Please do not hesitate to reach out for any questions or clarifications.       CC: Carlos Candelaria MD

## 2021-02-12 ENCOUNTER — OFFICE VISIT (OUTPATIENT)
Dept: ONCOLOGY | Age: 66
End: 2021-02-12
Payer: MEDICARE

## 2021-02-12 VITALS
TEMPERATURE: 98.8 F | DIASTOLIC BLOOD PRESSURE: 75 MMHG | SYSTOLIC BLOOD PRESSURE: 138 MMHG | RESPIRATION RATE: 18 BRPM | WEIGHT: 194 LBS | BODY MASS INDEX: 28.73 KG/M2 | HEIGHT: 69 IN | HEART RATE: 73 BPM | OXYGEN SATURATION: 98 %

## 2021-02-12 DIAGNOSIS — D50.8 IRON DEFICIENCY ANEMIA SECONDARY TO INADEQUATE DIETARY IRON INTAKE: ICD-10-CM

## 2021-02-12 DIAGNOSIS — D64.9 CHRONIC ANEMIA: Primary | ICD-10-CM

## 2021-02-12 DIAGNOSIS — D46.9 MDS (MYELODYSPLASTIC SYNDROME) (HCC): ICD-10-CM

## 2021-02-12 PROCEDURE — G8536 NO DOC ELDER MAL SCRN: HCPCS | Performed by: INTERNAL MEDICINE

## 2021-02-12 PROCEDURE — 1101F PT FALLS ASSESS-DOCD LE1/YR: CPT | Performed by: INTERNAL MEDICINE

## 2021-02-12 PROCEDURE — 99213 OFFICE O/P EST LOW 20 MIN: CPT | Performed by: INTERNAL MEDICINE

## 2021-02-12 PROCEDURE — G8419 CALC BMI OUT NRM PARAM NOF/U: HCPCS | Performed by: INTERNAL MEDICINE

## 2021-02-12 PROCEDURE — G8432 DEP SCR NOT DOC, RNG: HCPCS | Performed by: INTERNAL MEDICINE

## 2021-02-12 PROCEDURE — G8427 DOCREV CUR MEDS BY ELIG CLIN: HCPCS | Performed by: INTERNAL MEDICINE

## 2021-02-12 PROCEDURE — 3017F COLORECTAL CA SCREEN DOC REV: CPT | Performed by: INTERNAL MEDICINE

## 2021-02-12 NOTE — PROGRESS NOTES
Hematology/Oncology  Progress Note    Name: Nick Fermin  : 1955  PCP: Cody Barnard MD   Date: 2021    Mr. Prasad Collier is a 72 y.o. man with a history of presumably early myelodysplastic syndrome and chronic anemia. Subjective:     Mr. Prasad Collier is a 42-year-old man with possible early myelodysplastic syndrome chronic anemia. He reports he started having this problem in 2018. Today he is here in our clinic for his follow up office visit. He states he is been feeling well since last visit. He denies fatigue, tiredness, and shortness of breath. The patient otherwise has no other complaints. Denied fever, chills, night sweat, unintentional weight loss, skin lumps or bumps, acute bleeding or bruising issues. No acute bleeding, blood in stool, dark stool, melena, hematochezia, hemoptysis, dark urine, or easily bruising. Denied headache, acute vision change, dizziness, chest pain, worsen shortness of breath, palpitation, productive cough, nausea, vomiting, abdominal pain, altered bowel habits, dysuria, new bone pain or back pain, focal numbness or weakness. Past medical history, family history, and social history: these were reviewed and remains unchanged.     Past Medical History:   Diagnosis Date    Chronic kidney disease     Diabetes mellitus     Essential hypertension     Kidney disease     Prostate cancer ([facility] [address]      Past Surgical History:   Procedure Laterality Date    HX CERVICAL FUSION      HX OTHER SURGICAL      Prostate Sx r/t cancer (seed implant)    HX OTHER SURGICAL      shoulder surgery     Social History     Socioeconomic History    Marital status:      Spouse name: Not on file    Number of children: Not on file    Years of education: Not on file    Highest education level: Not on file   Occupational History    Not on file   Social Needs    Financial resource strain: Not on file    Food insecurity     Worry: Not on file     Inability: Not on file   Steve Flowers Transportation needs     Medical: Not on file     Non-medical: Not on file   Tobacco Use    Smoking status: Former Smoker     Packs/day: 0.25    Smokeless tobacco: Never Used    Tobacco comment: Patient quit in 2011, and started back. Light Smoker now   Substance and Sexual Activity    Alcohol use: No     Alcohol/week: 0.0 standard drinks    Drug use: No    Sexual activity: Not on file   Lifestyle    Physical activity     Days per week: Not on file     Minutes per session: Not on file    Stress: Not on file   Relationships    Social connections     Talks on phone: Not on file     Gets together: Not on file     Attends Shinto service: Not on file     Active member of club or organization: Not on file     Attends meetings of clubs or organizations: Not on file     Relationship status: Not on file    Intimate partner violence     Fear of current or ex partner: Not on file     Emotionally abused: Not on file     Physically abused: Not on file     Forced sexual activity: Not on file   Other Topics Concern    Not on file   Social History Narrative    Not on file     Family History   Problem Relation Age of Onset    Diabetes Father     No Known Problems Brother     Other Brother         PE     Current Outpatient Medications   Medication Sig Dispense Refill    insulin aspart protamine/insulin aspart (NOVOLOG MIX 70-30FLEXPEN U-100) 100 unit/mL (70-30) inpn 12 Units by SubCUTAneous route Before breakfast, lunch, and dinner.  ARIPiprazole (ABILIFY) 10 mg tablet Take 10 mg by mouth daily.  zolpidem (AMBIEN) 5 mg tablet Take  by mouth nightly as needed for Sleep.  acetaminophen (TYLENOL) 500 mg tablet Take 500 mg by mouth every six (6) hours as needed for Pain.  rosuvastatin (CRESTOR) 40 mg tablet Take 40 mg by mouth nightly.  hydroCHLOROthiazide 25 mg tab 25 mg, lisinopril 20 mg tab 20 mg Take  by mouth daily.  HCTZ 12.5 mg / Lisinopril 20 mg, PO Daily      venlafaxine (EFFEXOR) 75 mg tablet Take 75 mg by mouth daily.  multivitamin (ONE A DAY) tablet Take 1 Tab by mouth daily.  LORazepam (ATIVAN) 1 mg tablet Take 1 mg by mouth every twelve (12) hours as needed for Anxiety.  aspirin 81 mg chewable tablet Take 81 mg by mouth daily.  cyclobenzaprine (FLEXERIL) 10 mg tablet Take 1 Tab by mouth three (3) times daily as needed for Muscle Spasm(s). 60 Tab 0    metFORMIN (GLUCOPHAGE) 1,000 mg tablet Take 1,000 mg by mouth two (2) times daily (with meals).  diclofenac EC (VOLTAREN) 75 mg EC tablet Take 1 Tab by mouth two (2) times daily (with meals). 60 Tab 2    donepezil (ARICEPT) 10 mg tablet Take 10 mg by mouth nightly.  LUNESTA 3 mg tablet Take 3 mg by mouth nightly. Review of Systems   Constitutional: Negative for chills, diaphoresis, fever, malaise/fatigue and weight loss. Respiratory: Negative for cough, hemoptysis, shortness of breath and wheezing. Cardiovascular: Negative for chest pain, palpitations and leg swelling. Gastrointestinal: Negative for abdominal pain, diarrhea, heartburn, nausea and vomiting. Genitourinary: Negative for dysuria, frequency, hematuria and urgency. Musculoskeletal: Negative for joint pain and myalgias. Skin: Negative for itching and rash. Neurological: Negative for dizziness, seizures, weakness and headaches. Psychiatric/Behavioral: Negative for depression. The patient does not have insomnia.              Objective:     Visit Vitals  /75   Pulse 73   Temp 98.8 °F (37.1 °C) (Oral)   Resp 18   Ht 5' 9\" (1.753 m)   Wt 88 kg (194 lb)   SpO2 98%   BMI 28.65 kg/m²       ECOG Performance Status (grade): 0  0 - able to carry on all pre-disease activity w/out restriction  1 - restricted but able to carry out light work  2 - ambulatory and can self- care but unable to carry out work  3 - bed or chair >50% of waking hours  4 - completely disable, total care, confined to bed or chair    Physical Exam  Constitutional: General: He is not in acute distress. HENT:      Head: Normocephalic and atraumatic. Eyes:      Pupils: Pupils are equal, round, and reactive to light. Neck:      Musculoskeletal: Neck supple. No neck rigidity. Cardiovascular:      Pulses: Normal pulses. Heart sounds: Normal heart sounds. No murmur. Pulmonary:      Effort: Pulmonary effort is normal. No respiratory distress. Breath sounds: Normal breath sounds. Abdominal:      General: Bowel sounds are normal. There is no distension. Palpations: Abdomen is soft. There is no mass. Tenderness: There is no abdominal tenderness. There is no guarding. Musculoskeletal:         General: No swelling or tenderness. Lymphadenopathy:      Cervical: No cervical adenopathy. Skin:     General: Skin is warm. Findings: No rash. Neurological:      Mental Status: He is alert and oriented to person, place, and time. Mental status is at baseline. Cranial Nerves: No cranial nerve deficit. Psychiatric:         Mood and Affect: Mood normal.          Diagnostics:      No results found for this or any previous visit (from the past 96 hour(s)). Imaging:  Results for orders placed during the hospital encounter of 11/05/18   IR BX BONE MARROW DIAGNOSTIC    Narrative PREOPERATIVE DIAGNOSIS: Anemia. POSTOPERATIVE DIAGNOSIS: Same    ATTENDING: KARON Mack Ciro Daquan: None. PROCEDURES: Fluoroscopically guided bone marrow biopsy. ANESTHESIA: Local 1% lidocaine as well as moderate intravenous sedation with  Versed and fentanyl given and monitored per independently trained interventional  radiology nurse under my direct supervision for 20 minutes. Please see nursing  records for detailed medication dosing. CONTRAST: None. COMPLICATIONS: None    DRAIN: No    CATHETER: None. EBL: Minimal.    SPECIMEN: 2 aspirates and single core biopsy was obtained.     Fluoroscopy radiation dose: Cumulative Air KERMA = 26 mGy.    TECHNIQUE: After detailed explanation of risks and benefits of the procedure  verbal and written consent were obtained. Patient was brought to the  interventional radiology room and placed prone on the table. Timeout was  performed.  view was obtained. Target lesion was identified and skin was  marked overlying left iliac crest.    Left iliac crest region was prepped and draped in the usual sterile fashion. Maximum sterile there are technique was used. 1% lidocaine solution was instilled in the skin superficial and deep  subcutaneous soft tissues overlying the biopsy region. Under direct fluoroscopy guidance using 11-gauge [employer] biopsy needle was  advanced down through left iliac crest periosteum with drill power assistance. Single pass was made. 2 aspirates and single core biopsy were obtained. Given to  pathology on site. Postbiopsy imaging was obtained. FINDINGS: Fluoroscopic guidance demonstrated good position of the biopsy needle. Postbiopsy imaging shows no abnormality. Impression IMPRESSION:    Successful, uncomplicated fluoroscopically guided bone marrow biopsy. Results for orders placed during the hospital encounter of 06/20/16   XR FOOT LT MIN 3 V    Narrative Left Foot Three Views    CPT CODE: 19422    HISTORY: Abnormal bone scan with increased activity at the left mid foot, bone  scan performed for prostate carcinoma. COMPARISON: Bone scan same day. FINDINGS:     Three views obtained. There is narrowing with spurring involving the metatarsal  tarsal joints likely mild subchondral sclerosis and erosions at the second  metatarsal tarsal joint. Narrowing of the naviculocuneiform joints with  spurring. Healed fracture deformity of the distal fifth metatarsal.      Impression IMPRESSION:    Osteoarthrosis of the midfoot. Fresh !          Results for orders placed during the hospital encounter of 06/20/16   CT ABD PELV W CONT    Narrative CT Abdomen And Pelvis with Intravenous Contrast    INDICATION: Prostate cancer. TECHNIQUE: 5 mm collimation axial images obtained from the diaphragm to the  level of the pubic symphysis following the uneventful administration of  100 cc  of low osmolar, nonionic intravenous contrast.    COMPARISON: Unenhanced CT 12/5/2015. ABDOMEN FINDINGS:    Lung Bases: Clear. The visualized portions of the mediastinum are normal..    Liver: Normal attenuation. No evidence for mass. Gallbladder: Present and appears normal. No biliary ductal dilatation. Pancreas: Normal attenuation without mass or ductal dilatation. Spleen: Normal in size. No evidence of mass. .    Adrenal Glands: No evidence for mass. Kidneys:     Right: Normal enhancement. A posterior interpolar cyst measures 12 mm. No  hydronephrosis. Left:  Normal enhancement with several cysts measuring up to 16 mm. No  hydronephrosis. Lymph Nodes: A mildly prominent lymph node adjacent to the left common iliac  artery (image 35) measures 8 mm in longest dimension and is stable. There are no  new enlarged lymph nodes. There are no enlarged abdominal lymph nodes. Aorta is diffusely calcified but not aneurysmally dilated. PELVIS FINDINGS:     Bowel: Small Bowel: Normal in caliber with normal wall thickness. Large Bowel: Scattered diverticula are present without associated inflammation. Appendix: Not visualized and may be absent or collapsed. Bladder: Normal. No ureteral dilatation. Lymph nodes: No evidence of mesenteric or pelvic lymph node enlargement. Bones: Intramedullary noemy is in the left femur without associated fracture or  lucency to suggest loosening. There are scattered bone islands in the proximal  femurs. There are degenerative changes of the spine. There are no destructive  lesions. Impression IMPRESSION:    1. No soft tissue mass to suggest metastases. A mildly prominent  retroperitoneal lymph node is stable.  No evidence of osseous metastases noting  bone scan is a more sensitive modality to detect osseous metastasis.    2.   Bilateral renal cysts. Diverticulosis coli.    3. Left hip orthopedic hardware as described above.             Assessment:     1. Chronic anemia    2. MDS (myelodysplastic syndrome) (HCC)    3. Iron deficiency anemia secondary to inadequate dietary iron intake      Plan:   Chronic anemia  Early myelodysplastic syndrome:  -- Patient was being followed by Dr. Choe who retired recently. His previous bone marrow biopsy reported mild hypocellularity, no specific features are identified within the bone marrow. He was diagnosed of presumably early MDS.  -- Clinically asymptomatic. His recent CBC showed stable H/H for months. No thrombocytopenia or leukopenia noted. Likely not MDS, probably anemia of chronic disease. Retacrit was d/c during her last office visit .  -- We will check his CBC and CMP periodically.     Prostate cancer  -- Lurpon injection q 6 months  -- [facility]      -- We will see the patient back in clinic in about 4 months. Always sooner if required.        Orders Placed This Encounter   • METABOLIC PANEL, COMPREHENSIVE     Standing Status:   Future     Number of Occurrences:   1     Standing Expiration Date:   2/13/2022   • FERRITIN     Standing Status:   Future     Number of Occurrences:   1     Standing Expiration Date:   2/12/2022   • IRON PROFILE     Standing Status:   Future     Number of Occurrences:   1     Standing Expiration Date:   2/12/2022   • CBC WITH AUTOMATED DIFF     Standing Status:   Future     Number of Occurrences:   1     Standing Expiration Date:   2/13/2022           Mr. Pierre has a reminder for a \"due or due soon\" health maintenance. I have asked that he contact his primary care provider for follow-up on this health maintenance.   All of patient's questions answered to their apparent satisfaction. They verbally show understanding and agreement with  aforementioned plan.          Gustavo Zelaya NP  2/12/2021      CC: Enriqueta Lombardo MD

## 2021-02-12 NOTE — PATIENT INSTRUCTIONS
Anemia: Care Instructions  Your Care Instructions     Anemia is a low level of red blood cells, which carry oxygen throughout your body. Many things can cause anemia. Lack of iron is one of the most common causes. Your body needs iron to make hemoglobin, a substance in red blood cells that carries oxygen from the lungs to your body's cells. Without enough iron, the body produces fewer and smaller red blood cells. As a result, your body's cells do not get enough oxygen, and you feel tired and weak. And you may have trouble concentrating. Bleeding is the most common cause of a lack of iron. You may have heavy menstrual bleeding or bleeding caused by conditions such as ulcers, hemorrhoids, or cancer. Regular use of aspirin or other anti-inflammatory medicines (such as ibuprofen) also can cause bleeding in some people. A lack of iron in your diet also can cause anemia, especially at times when the body needs more iron, such as during pregnancy, infancy, and the teen years. Your doctor may have prescribed iron pills. It may take several months of treatment for your iron levels to return to normal. Your doctor also may suggest that you eat foods that are rich in iron, such as meat and beans. There are many other causes of anemia. It is not always due to a lack of iron. Finding the specific cause of your anemia will help your doctor find the right treatment for you. Follow-up care is a key part of your treatment and safety. Be sure to make and go to all appointments, and call your doctor if you are having problems. It's also a good idea to know your test results and keep a list of the medicines you take. How can you care for yourself at home? · Take your medicines exactly as prescribed. Call your doctor if you think you are having a problem with your medicine. · If your doctor recommends iron pills, take them as directed:  ? Try to take the pills on an empty stomach about 1 hour before or 2 hours after meals. But you may need to take iron with food to avoid an upset stomach. ? Do not take antacids or drink milk or caffeine drinks (such as coffee, tea, or cola) at the same time or within 2 hours of the time that you take your iron. They can make it hard for your body to absorb the iron. ? Vitamin C (from food or supplements) helps your body absorb iron. Try taking iron pills with a glass of orange juice or some other food that is high in vitamin C, such as citrus fruits. ? Iron pills may cause stomach problems, such as heartburn, nausea, diarrhea, constipation, and cramps. Be sure to drink plenty of fluids, and include fruits, vegetables, and fiber in your diet each day. Iron pills often make your bowel movements dark or green. ? If you forget to take an iron pill, do not take a double dose of iron the next time you take a pill. ? Keep iron pills out of the reach of small children. An overdose of iron can be very dangerous. · Follow your doctor's advice about eating iron-rich foods. These include red meat, shellfish, poultry, eggs, beans, raisins, whole-grain bread, and leafy green vegetables. · Steam vegetables to help them keep their iron content. When should you call for help? Call 911 anytime you think you may need emergency care. For example, call if:    · You have symptoms of a heart attack. These may include:  ? Chest pain or pressure, or a strange feeling in the chest.  ? Sweating. ? Shortness of breath. ? Nausea or vomiting. ? Pain, pressure, or a strange feeling in the back, neck, jaw, or upper belly or in one or both shoulders or arms. ? Lightheadedness or sudden weakness. ? A fast or irregular heartbeat. After you call 911, the  may tell you to chew 1 adult-strength or 2 to 4 low-dose aspirin. Wait for an ambulance. Do not try to drive yourself.     · You passed out (lost consciousness).    Call your doctor now or seek immediate medical care if:    · You have new or increased shortness of breath.     · You are dizzy or lightheaded, or you feel like you may faint.     · Your fatigue and weakness continue or get worse.     · You have any abnormal bleeding, such as:  ? Nosebleeds. ? Vaginal bleeding that is different (heavier, more frequent, at a different time of the month) than what you are used to.  ? Bloody or black stools, or rectal bleeding. ? Bloody or pink urine. Watch closely for changes in your health, and be sure to contact your doctor if:    · You do not get better as expected. Where can you learn more? Go to http://www.russell.com/  Enter R301 in the search box to learn more about \"Anemia: Care Instructions. \"  Current as of: November 8, 2019               Content Version: 12.6  © 6329-6323 [employer]. Care instructions adapted under license by [employer] (which disclaims liability or warranty for this information). If you have questions about a medical condition or this instruction, always ask your healthcare professional. [address] any warranty or liability for your use of this information. Myelodysplastic Syndromes: Care Instructions  Overview  Myelodysplastic syndromes, also called MDS, are a group of cancers in which the bone marrow does not make enough healthy blood cells. Normally, the bone marrow makes red blood cells, white blood cells, and platelets. These cells carry oxygen in the blood, help the body fight infections, and help the blood clot. With MDS, you may feel weak and tired, get infections often, and bleed easily, although symptoms tend to vary. MDS is a form of blood cancer. In some cases, MDS can turn into acute myeloid leukemia (AML), another type of cancer. Some people develop MDS after treatment for cancer or exposure to pesticides or other chemicals. But in most cases, the cause of MDS is not known.   Your doctor will use the results of tests, including blood tests, to guide your treatment. There are many types of MDS, with different treatment plans for each. If you have enough red blood cells and are feeling all right, you may not need active treatment, but you and your doctor will want to watch your condition carefully. If you start feeling lightheaded and have no energy, you may need a blood transfusion. Other treatments include medicines like chemotherapy and stem cell transplants. Follow-up care is a key part of your treatment and safety. Be sure to make and go to all appointments, and call your doctor if you are having problems. It's also a good idea to know your test results and keep a list of the medicines you take. How can you care for yourself at home? · Take your medicines exactly as prescribed. Call your doctor if you think you are having a problem with your medicine. You will get more details on the specific medicines your doctor prescribes. · If your doctor prescribed antibiotics, take them as directed. Do not stop taking them just because you feel better. You need to take the full course of antibiotics. If you have side effects from antibiotics, tell your doctor. · Take steps to control your stress and workload. Learn relaxation techniques. ? Share your feelings. Stress and tension affect our emotions. By expressing your feelings to others, you may be able to understand and cope with them. ? Consider joining a support group. Talking about a problem with your spouse, a good friend, or other people with similar problems is a good way to reduce tension and stress. ? Express yourself with art. Try writing, crafts, dance, or art to relieve stress. ? Be kind to your body and mind. Getting enough sleep, eating a healthy diet, and taking time to do things you enjoy can contribute to an overall feeling of balance in your life and help reduce stress. ? Get help if you need it. Discuss your concerns with your doctor or counselor. · Do not smoke.  Smoking can make blood problems worse. If you need help quitting, talk to your doctor about stop-smoking programs and medicines. These can increase your chances of quitting for good. · If you have not already done so, prepare a list of advance directives. Advance directives are instructions to your doctor and family members about what kind of care you want if you become unable to speak or express yourself. · Call the [employer] (1-634.222.9348) or visit its website at [employer] for more information. When should you call for help? Call 911 anytime you think you may need emergency care. For example, call if:    · You passed out (lost consciousness). Call your doctor now or seek immediate medical care if:    · You have a fever.     · You have abnormal bleeding.     · You have new or worse pain.     · You think you have an infection.     · You have new symptoms, such as a cough, belly pain, vomiting, diarrhea, or a rash. Watch closely for changes in your health, and be sure to contact your doctor if:    · You are much more tired than usual.     · You have swollen glands in your armpits, groin, or neck.     · You do not get better as expected. Where can you learn more? Go to http://www.gray.com/  Enter U281 in the search box to learn more about \"Myelodysplastic Syndromes: Care Instructions. \"  Current as of: April 29, 2020               Content Version: 12.6  © 5351-6636 [employer]. Care instructions adapted under license by [employer] (which disclaims liability or warranty for this information). If you have questions about a medical condition or this instruction, always ask your healthcare professional. [address] any warranty or liability for your use of this information.

## 2021-02-13 LAB
ALBUMIN SERPL-MCNC: 4.2 G/DL (ref 3.8–4.8)
ALBUMIN/GLOB SERPL: 1.5 {RATIO} (ref 1.2–2.2)
ALP SERPL-CCNC: 87 IU/L (ref 39–117)
ALT SERPL-CCNC: 8 IU/L (ref 0–44)
AST SERPL-CCNC: 15 IU/L (ref 0–40)
BASOPHILS # BLD AUTO: 0 X10E3/UL (ref 0–0.2)
BASOPHILS NFR BLD AUTO: 1 %
BILIRUB SERPL-MCNC: <0.2 MG/DL (ref 0–1.2)
BUN SERPL-MCNC: 17 MG/DL (ref 8–27)
BUN/CREAT SERPL: 16 (ref 10–24)
CALCIUM SERPL-MCNC: 9.8 MG/DL (ref 8.6–10.2)
CHLORIDE SERPL-SCNC: 100 MMOL/L (ref 96–106)
CO2 SERPL-SCNC: 25 MMOL/L (ref 20–29)
CREAT SERPL-MCNC: 1.06 MG/DL (ref 0.76–1.27)
EOSINOPHIL # BLD AUTO: 0.1 X10E3/UL (ref 0–0.4)
EOSINOPHIL NFR BLD AUTO: 2 %
ERYTHROCYTE [DISTWIDTH] IN BLOOD BY AUTOMATED COUNT: 15.6 % (ref 11.6–15.4)
FERRITIN SERPL-MCNC: 163 NG/ML (ref 30–400)
GLOBULIN SER CALC-MCNC: 2.8 G/DL (ref 1.5–4.5)
GLUCOSE SERPL-MCNC: NORMAL MG/DL
HCT VFR BLD AUTO: 35.1 % (ref 37.5–51)
HGB BLD-MCNC: 11.4 G/DL (ref 13–17.7)
IMM GRANULOCYTES # BLD AUTO: 0 X10E3/UL (ref 0–0.1)
IMM GRANULOCYTES NFR BLD AUTO: 0 %
IRON SATN MFR SERPL: 22 % (ref 15–55)
IRON SERPL-MCNC: 67 UG/DL (ref 38–169)
LYMPHOCYTES # BLD AUTO: 1.3 X10E3/UL (ref 0.7–3.1)
LYMPHOCYTES NFR BLD AUTO: 24 %
MCH RBC QN AUTO: 29.2 PG (ref 26.6–33)
MCHC RBC AUTO-ENTMCNC: 32.5 G/DL (ref 31.5–35.7)
MCV RBC AUTO: 90 FL (ref 79–97)
MONOCYTES # BLD AUTO: 0.4 X10E3/UL (ref 0.1–0.9)
MONOCYTES NFR BLD AUTO: 8 %
NEUTROPHILS # BLD AUTO: 3.4 X10E3/UL (ref 1.4–7)
NEUTROPHILS NFR BLD AUTO: 65 %
PLATELET # BLD AUTO: 260 X10E3/UL (ref 150–450)
POTASSIUM SERPL-SCNC: NORMAL MMOL/L
PROT SERPL-MCNC: 7 G/DL (ref 6–8.5)
RBC # BLD AUTO: 3.9 X10E6/UL (ref 4.14–5.8)
SODIUM SERPL-SCNC: 138 MMOL/L (ref 134–144)
TIBC SERPL-MCNC: 310 UG/DL (ref 250–450)
UIBC SERPL-MCNC: 243 UG/DL (ref 111–343)
WBC # BLD AUTO: 5.3 X10E3/UL (ref 3.4–10.8)

## 2021-09-24 DIAGNOSIS — D46.9 MDS (MYELODYSPLASTIC SYNDROME) (HCC): Primary | ICD-10-CM

## 2021-10-04 ENCOUNTER — HOSPITAL ENCOUNTER (OUTPATIENT)
Dept: LAB | Age: 66
Discharge: HOME | End: 2021-10-04
Payer: MEDICARE

## 2021-10-04 ENCOUNTER — APPOINTMENT (OUTPATIENT)
Dept: ONCOLOGY | Age: 66
End: 2021-10-04

## 2021-10-04 DIAGNOSIS — D46.9 MDS (MYELODYSPLASTIC SYNDROME) (HCC): ICD-10-CM

## 2021-10-04 LAB
ALBUMIN SERPL-MCNC: 3.3 G/DL (ref 3.4–5)
ALBUMIN/GLOB SERPL: 0.8 {RATIO} (ref 0.8–1.7)
ALP SERPL-CCNC: 139 U/L (ref 45–117)
ALT SERPL-CCNC: 17 U/L (ref 16–61)
ANION GAP SERPL CALC-SCNC: 8 MMOL/L (ref 3–18)
AST SERPL-CCNC: 14 U/L (ref 10–38)
BASOPHILS # BLD: 0 K/UL (ref 0–0.1)
BASOPHILS NFR BLD: 1 % (ref 0–2)
BILIRUB SERPL-MCNC: 0.2 MG/DL (ref 0.2–1)
BUN SERPL-MCNC: 14 MG/DL (ref 7–18)
BUN/CREAT SERPL: 12 (ref 12–20)
CALCIUM SERPL-MCNC: 9.3 MG/DL (ref 8.5–10.1)
CHLORIDE SERPL-SCNC: 104 MMOL/L (ref 100–111)
CO2 SERPL-SCNC: 26 MMOL/L (ref 21–32)
CREAT SERPL-MCNC: 1.14 MG/DL (ref 0.6–1.3)
DIFFERENTIAL METHOD BLD: ABNORMAL
EOSINOPHIL # BLD: 0.1 K/UL (ref 0–0.4)
EOSINOPHIL NFR BLD: 2 % (ref 0–5)
ERYTHROCYTE [DISTWIDTH] IN BLOOD BY AUTOMATED COUNT: 13.8 % (ref 11.6–14.5)
FERRITIN SERPL-MCNC: 141 NG/ML (ref 8–388)
GLOBULIN SER CALC-MCNC: 3.9 G/DL (ref 2–4)
GLUCOSE SERPL-MCNC: 322 MG/DL (ref 74–99)
HCT VFR BLD AUTO: 34.9 % (ref 36–48)
HGB BLD-MCNC: 10.9 G/DL (ref 13–16)
IRON SATN MFR SERPL: 22 % (ref 20–50)
IRON SERPL-MCNC: 71 UG/DL (ref 50–175)
LYMPHOCYTES # BLD: 1.7 K/UL (ref 0.9–3.6)
LYMPHOCYTES NFR BLD: 37 % (ref 21–52)
MCH RBC QN AUTO: 28.5 PG (ref 24–34)
MCHC RBC AUTO-ENTMCNC: 31.2 G/DL (ref 31–37)
MCV RBC AUTO: 91.4 FL (ref 78–100)
MONOCYTES # BLD: 0.4 K/UL (ref 0.05–1.2)
MONOCYTES NFR BLD: 9 % (ref 3–10)
NEUTS SEG # BLD: 2.4 K/UL (ref 1.8–8)
NEUTS SEG NFR BLD: 51 % (ref 40–73)
PLATELET # BLD AUTO: 295 K/UL (ref 135–420)
PMV BLD AUTO: 8.8 FL (ref 9.2–11.8)
POTASSIUM SERPL-SCNC: 4.7 MMOL/L (ref 3.5–5.5)
PROT SERPL-MCNC: 7.2 G/DL (ref 6.4–8.2)
RBC # BLD AUTO: 3.82 M/UL (ref 4.35–5.65)
SODIUM SERPL-SCNC: 138 MMOL/L (ref 136–145)
TIBC SERPL-MCNC: 330 UG/DL (ref 250–450)
WBC # BLD AUTO: 4.7 K/UL (ref 4.6–13.2)

## 2021-10-04 PROCEDURE — 82728 ASSAY OF FERRITIN: CPT

## 2021-10-04 PROCEDURE — 36415 COLL VENOUS BLD VENIPUNCTURE: CPT

## 2021-10-04 PROCEDURE — 83550 IRON BINDING TEST: CPT

## 2021-10-04 PROCEDURE — 85025 COMPLETE CBC W/AUTO DIFF WBC: CPT

## 2021-10-04 PROCEDURE — 80053 COMPREHEN METABOLIC PANEL: CPT

## 2021-10-08 ENCOUNTER — VIRTUAL VISIT (OUTPATIENT)
Dept: ONCOLOGY | Age: 66
End: 2021-10-08
Payer: MEDICARE

## 2021-10-08 DIAGNOSIS — D64.9 CHRONIC ANEMIA: Primary | ICD-10-CM

## 2021-10-08 DIAGNOSIS — D50.8 IRON DEFICIENCY ANEMIA SECONDARY TO INADEQUATE DIETARY IRON INTAKE: ICD-10-CM

## 2021-10-08 DIAGNOSIS — C61 PROSTATE CANCER (HCC): ICD-10-CM

## 2021-10-08 DIAGNOSIS — D63.8 ANEMIA OF CHRONIC DISEASE: ICD-10-CM

## 2021-10-08 PROCEDURE — 99442 PR PHYS/QHP TELEPHONE EVALUATION 11-20 MIN: CPT | Performed by: INTERNAL MEDICINE

## 2021-10-08 NOTE — PROGRESS NOTES
Devika King is a 77 y.o. male, evaluated via audio-only technology on 10/8/2021 for Follow-up  . Assessment & Plan:   Chronic anemia  -- Patient was being followed by Dr. Jack Mccullough who retired recently. His previous bone marrow biopsy reported mild hypocellularity, no specific features are identified within the bone marrow. He was diagnosed of presumably early MDS. -- Clinically asymptomatic. -- His recent CBC showed H/H of 10.9 g/dl and 34.9%. PLT of 295 K. No thrombocytopenia or leukopenia noted. Likely not MDS, probably anemia of chronic disease. Plan:  -- Patient has been advise to take oral iron supplement once a day or every other day and to add iron fortified foods to his diet  -- Patient has hx Diabetic, will follow up with PCP on his elevated Glucose of 322.   -- CBC and  CMP, iron and ferritin will be ordered periodically.      Prostate cancer  -- Lurpon injection q 6 months  -- He is being cared by [place] [facility]        -- We will see the patient back in clinic in about 4 months. Always sooner if required.       12  Subjective:   Mr. Talya Willingham is a 59-year-old man with chronic anemia. Today denies fatigue, tiredness, and shortness of breath. The patient otherwise has no other complaints. Denied fever, chills, night sweat, unintentional weight loss, skin lumps or bumps, acute bleeding or bruising issues. No acute bleeding, blood in stool, dark stool, melena, hematochezia, hemoptysis, dark urine, or easily bruising. Denied headache, acute vision change, dizziness, chest pain, worsen shortness of breath, palpitation, productive cough, nausea, vomiting, abdominal pain, altered bowel habits, dysuria, new bone pain or back pain, focal numbness or weakness. Prior to Admission medications    Medication Sig Start Date End Date Taking?  Authorizing Provider   insulin aspart protamine/insulin aspart (NOVOLOG MIX 70-30FLEXPEN U-100) 100 unit/mL (70-30) inpn 12 Units by SubCUTAneous route Before breakfast, lunch, and dinner. Provider, Historical   ARIPiprazole (ABILIFY) 10 mg tablet Take 10 mg by mouth daily. Provider, Historical   zolpidem (AMBIEN) 5 mg tablet Take  by mouth nightly as needed for Sleep. Provider, Historical   acetaminophen (TYLENOL) 500 mg tablet Take 500 mg by mouth every six (6) hours as needed for Pain. Provider, Historical   rosuvastatin (CRESTOR) 40 mg tablet Take 40 mg by mouth nightly. Provider, Historical   hydroCHLOROthiazide 25 mg tab 25 mg, lisinopril 20 mg tab 20 mg Take  by mouth daily. HCTZ 12.5 mg / Lisinopril 20 mg, PO Daily    Provider, Historical   venlafaxine (EFFEXOR) 75 mg tablet Take 75 mg by mouth daily. Provider, Historical   multivitamin (ONE A DAY) tablet Take 1 Tab by mouth daily. Provider, Historical   LORazepam (ATIVAN) 1 mg tablet Take 1 mg by mouth every twelve (12) hours as needed for Anxiety. Provider, Historical   aspirin 81 mg chewable tablet Take 81 mg by mouth daily. Provider, Historical   cyclobenzaprine (FLEXERIL) 10 mg tablet Take 1 Tab by mouth three (3) times daily as needed for Muscle Spasm(s). 11/29/16   MAYRA Barraza   metFORMIN (GLUCOPHAGE) 1,000 mg tablet Take 1,000 mg by mouth two (2) times daily (with meals). Provider, Historical   diclofenac EC (VOLTAREN) 75 mg EC tablet Take 1 Tab by mouth two (2) times daily (with meals). 4/12/16   Allan Wood MD   donepezil (ARICEPT) 10 mg tablet Take 10 mg by mouth nightly. 9/19/11   Provider, Historical   LUNESTA 3 mg tablet Take 3 mg by mouth nightly. 9/18/11   Provider, Historical         Review of Systems   Constitutional: Negative. HENT: Negative. Eyes: Negative. Respiratory: Negative. Cardiovascular: Negative. Gastrointestinal: Negative. Genitourinary: Negative. Musculoskeletal: Negative. Skin: Negative. Neurological: Negative. Endo/Heme/Allergies: Negative. Psychiatric/Behavioral: Negative.         Patient-Reported Vitals 10/8/2021 Patient-Reported Weight 190lb       Franco Dawson, who was evaluated through a patient-initiated, synchronous (real-time) audio only encounter, and/or her healthcare decision maker, is aware that it is a billable service, with coverage as determined by his insurance carrier. He provided verbal consent to proceed: YES -Consent obtained within past 12 months Yes. . He has not had a related appointment within my department in the past 7 days or scheduled within the next 24 hours. Time Documentation 20 min     Fahad Ortiz NP       I have assessed the patient independently and agreed with the full assessment as outlined. Kermit HOUSE Do, M.D.

## 2022-02-08 ENCOUNTER — APPOINTMENT (OUTPATIENT)
Dept: ONCOLOGY | Age: 67
End: 2022-02-08

## 2022-02-10 LAB
ALBUMIN SERPL-MCNC: 4.4 G/DL (ref 3.8–4.8)
ALBUMIN/GLOB SERPL: 1.6 {RATIO} (ref 1.2–2.2)
ALP SERPL-CCNC: 343 IU/L (ref 44–121)
ALT SERPL-CCNC: 11 IU/L (ref 0–44)
AST SERPL-CCNC: 19 IU/L (ref 0–40)
BASOPHILS # BLD AUTO: 0 X10E3/UL (ref 0–0.2)
BASOPHILS NFR BLD AUTO: 0 %
BILIRUB SERPL-MCNC: 0.3 MG/DL (ref 0–1.2)
BUN SERPL-MCNC: 14 MG/DL (ref 8–27)
BUN/CREAT SERPL: 15 (ref 10–24)
CALCIUM SERPL-MCNC: 8.6 MG/DL (ref 8.6–10.2)
CHLORIDE SERPL-SCNC: 104 MMOL/L (ref 96–106)
CO2 SERPL-SCNC: 21 MMOL/L (ref 20–29)
CREAT SERPL-MCNC: 0.93 MG/DL (ref 0.76–1.27)
EOSINOPHIL # BLD AUTO: 0 X10E3/UL (ref 0–0.4)
EOSINOPHIL NFR BLD AUTO: 0 %
ERYTHROCYTE [DISTWIDTH] IN BLOOD BY AUTOMATED COUNT: 18.9 % (ref 11.6–15.4)
FERRITIN SERPL-MCNC: 336 NG/ML (ref 30–400)
GLOBULIN SER CALC-MCNC: 2.8 G/DL (ref 1.5–4.5)
GLUCOSE SERPL-MCNC: 144 MG/DL (ref 65–99)
HCT VFR BLD AUTO: 31.5 % (ref 37.5–51)
HGB BLD-MCNC: 9.7 G/DL (ref 13–17.7)
IMMATURE CELLS, 115398: ABNORMAL
IRON SATN MFR SERPL: 14 % (ref 15–55)
IRON SERPL-MCNC: 53 UG/DL (ref 38–169)
LYMPHOCYTES # BLD AUTO: 2.1 X10E3/UL (ref 0.7–3.1)
LYMPHOCYTES NFR BLD AUTO: 16 %
MCH RBC QN AUTO: 29.4 PG (ref 26.6–33)
MCHC RBC AUTO-ENTMCNC: 30.8 G/DL (ref 31.5–35.7)
MCV RBC AUTO: 96 FL (ref 79–97)
METAMYELOCYTES NFR BLD: 5 % (ref 0–0)
MONOCYTES # BLD AUTO: 0.7 X10E3/UL (ref 0.1–0.9)
MONOCYTES NFR BLD AUTO: 5 %
MORPHOLOGY BLD-IMP: ABNORMAL
NEUTROPHILS # BLD AUTO: 9.6 X10E3/UL (ref 1.4–7)
NEUTROPHILS NFR BLD AUTO: 72 %
NEUTS BAND NFR BLD AUTO: 2 %
NRBC BLD AUTO-RTO: 4 % (ref 0–0)
PLATELET # BLD AUTO: 258 X10E3/UL (ref 150–450)
POTASSIUM SERPL-SCNC: 4.9 MMOL/L (ref 3.5–5.2)
PROT SERPL-MCNC: 7.2 G/DL (ref 6–8.5)
RBC # BLD AUTO: 3.3 X10E6/UL (ref 4.14–5.8)
SODIUM SERPL-SCNC: 142 MMOL/L (ref 134–144)
TIBC SERPL-MCNC: 376 UG/DL (ref 250–450)
UIBC SERPL-MCNC: 323 UG/DL (ref 111–343)
WBC # BLD AUTO: 13 X10E3/UL (ref 3.4–10.8)

## 2022-02-11 ENCOUNTER — VIRTUAL VISIT (OUTPATIENT)
Dept: ONCOLOGY | Age: 67
End: 2022-02-11
Payer: MEDICARE

## 2022-02-11 DIAGNOSIS — D63.8 ANEMIA OF CHRONIC DISEASE: ICD-10-CM

## 2022-02-11 DIAGNOSIS — D50.8 IRON DEFICIENCY ANEMIA SECONDARY TO INADEQUATE DIETARY IRON INTAKE: ICD-10-CM

## 2022-02-11 DIAGNOSIS — D46.9 MDS (MYELODYSPLASTIC SYNDROME) (HCC): ICD-10-CM

## 2022-02-11 DIAGNOSIS — D64.9 CHRONIC ANEMIA: Primary | ICD-10-CM

## 2022-02-11 DIAGNOSIS — C61 PROSTATE CANCER (HCC): ICD-10-CM

## 2022-02-11 PROCEDURE — 99442 PR PHYS/QHP TELEPHONE EVALUATION 11-20 MIN: CPT | Performed by: NURSE PRACTITIONER

## 2022-02-11 NOTE — PROGRESS NOTES
Conchita Malik is a 77 y.o. male, evaluated via audio-only technology on 2/11/2022 for Follow-up  . Assessment & Plan:   Chronic anemia  -- Patient was being followed by Dr. Rishabh Ashley who retired recently. His previous bone marrow biopsy reported mild hypocellularity, no specific features are identified within the bone marrow. He was diagnosed of presumably early MDS. -- Clinically asymptomatic. -- His recent CBC showed H/H of 9.7 g/dl and 31.5%. PLT of 258 K. No thrombocytopenia or leukopenia noted. Likely not MDS, probably anemia of chronic disease.      Plan:  -- Patient has been advise to continue taking the oral iron supplement  every other day due to constipation  Advise to add iron fortified foods to his diet  -- Patient has hx Diabetic, will follow up with PCP    -- CBC and  CMP, iron and ferritin will be ordered periodically.      Prostate cancer  -- Lurpon injection q 6 months  -- He is being [facility] by [facility]  --- He reported follow up in March 3, 2922        -- We will see the patient back in clinic in about 4 months. Always sooner if required. 12  Subjective:   Mr. Akila Butt is a 66-year-old man with chronic anemia. Patient denies fatigue, tiredness, and shortness of breath. fever, chills, night sweat, unintentional weight loss, skin lumps or bumps, acute bleeding or bruising issues. No acute bleeding, blood in stool, dark stool, melena, hematochezia, hemoptysis, dark urine, or easily bruising. Denied headache, acute vision change, dizziness, chest pain, worsen shortness of breath, palpitation, productive cough, nausea, vomiting, abdominal pain, altered bowel habits, dysuria, new bone pain or back pain, focal numbness or weakness    Prior to Admission medications    Medication Sig Start Date End Date Taking? Authorizing Provider   insulin aspart protamine/insulin aspart (NOVOLOG MIX 70-30FLEXPEN U-100) 100 unit/mL (70-30) inpn 12 Units by SubCUTAneous route Before breakfast, lunch, and dinner. Provider, Historical   ARIPiprazole (ABILIFY) 10 mg tablet Take 10 mg by mouth daily. Provider, Historical   zolpidem (AMBIEN) 5 mg tablet Take  by mouth nightly as needed for Sleep. Provider, Historical   acetaminophen (TYLENOL) 500 mg tablet Take 500 mg by mouth every six (6) hours as needed for Pain. Provider, Historical   rosuvastatin (CRESTOR) 40 mg tablet Take 40 mg by mouth nightly. Provider, Historical   hydroCHLOROthiazide 25 mg tab 25 mg, lisinopril 20 mg tab 20 mg Take  by mouth daily. HCTZ 12.5 mg / Lisinopril 20 mg, PO Daily    Provider, Historical   venlafaxine (EFFEXOR) 75 mg tablet Take 75 mg by mouth daily. Provider, Historical   multivitamin (ONE A DAY) tablet Take 1 Tab by mouth daily. Provider, Historical   LORazepam (ATIVAN) 1 mg tablet Take 1 mg by mouth every twelve (12) hours as needed for Anxiety. Provider, Historical   aspirin 81 mg chewable tablet Take 81 mg by mouth daily. Provider, Historical   cyclobenzaprine (FLEXERIL) 10 mg tablet Take 1 Tab by mouth three (3) times daily as needed for Muscle Spasm(s). 11/29/16   MAYRA Steiner   metFORMIN (GLUCOPHAGE) 1,000 mg tablet Take 1,000 mg by mouth two (2) times daily (with meals). Provider, Historical   diclofenac EC (VOLTAREN) 75 mg EC tablet Take 1 Tab by mouth two (2) times daily (with meals). 4/12/16   Jasmeet Beaulieu MD   donepezil (ARICEPT) 10 mg tablet Take 10 mg by mouth nightly. 9/19/11   Provider, Historical   LUNESTA 3 mg tablet Take 3 mg by mouth nightly. 9/18/11   Provider, Historical         Review of Systems   Constitutional: Negative. HENT: Negative. Eyes: Negative. Respiratory: Negative. Cardiovascular: Negative. Gastrointestinal: Negative. Genitourinary: Negative. Musculoskeletal: Negative. Skin: Negative. Neurological: Negative. Endo/Heme/Allergies: Negative. Psychiatric/Behavioral: Negative.         Patient-Reported Weight: 195lb       kyle Yap was evaluated through a patient-initiated, synchronous (real-time) audio only encounter, and/or her healthcare decision maker, is aware that it is a billable service, which includes applicable co-pays, with coverage as determined by his insurance carrier. He provided verbal consent to proceed. He has not had a related appointment within my department in the past 7 days or scheduled within the next 24 hours. The patient was located at home in a state where the provider was licensed to provide care.     Time Documentation 14 min      Desiree Acuña DNP

## 2022-05-31 ENCOUNTER — APPOINTMENT (OUTPATIENT)
Dept: ONCOLOGY | Age: 67
End: 2022-05-31

## 2022-05-31 DIAGNOSIS — D46.9 MDS (MYELODYSPLASTIC SYNDROME) (HCC): ICD-10-CM

## 2022-05-31 DIAGNOSIS — D64.9 CHRONIC ANEMIA: ICD-10-CM

## 2022-05-31 DIAGNOSIS — D50.8 IRON DEFICIENCY ANEMIA SECONDARY TO INADEQUATE DIETARY IRON INTAKE: ICD-10-CM

## 2022-05-31 DIAGNOSIS — D63.8 ANEMIA OF CHRONIC DISEASE: ICD-10-CM

## 2022-05-31 DIAGNOSIS — C61 PROSTATE CANCER (HCC): ICD-10-CM

## 2022-06-01 LAB
ALBUMIN SERPL-MCNC: 3.7 G/DL (ref 3.8–4.8)
ALBUMIN/GLOB SERPL: 1.5 {RATIO} (ref 1.2–2.2)
ALP SERPL-CCNC: 110 IU/L (ref 44–121)
ALT SERPL-CCNC: 6 IU/L (ref 0–44)
AST SERPL-CCNC: 15 IU/L (ref 0–40)
BASOPHILS # BLD AUTO: 0 X10E3/UL (ref 0–0.2)
BASOPHILS NFR BLD AUTO: 0 %
BILIRUB SERPL-MCNC: <0.2 MG/DL (ref 0–1.2)
BUN SERPL-MCNC: 12 MG/DL (ref 8–27)
BUN/CREAT SERPL: 14 (ref 10–24)
CALCIUM SERPL-MCNC: 8.2 MG/DL (ref 8.6–10.2)
CHLORIDE SERPL-SCNC: 100 MMOL/L (ref 96–106)
CO2 SERPL-SCNC: 22 MMOL/L (ref 20–29)
CREAT SERPL-MCNC: 0.88 MG/DL (ref 0.76–1.27)
EGFR: 95 ML/MIN/1.73
EOSINOPHIL # BLD AUTO: 0.2 X10E3/UL (ref 0–0.4)
EOSINOPHIL NFR BLD AUTO: 1 %
ERYTHROCYTE [DISTWIDTH] IN BLOOD BY AUTOMATED COUNT: 17.6 % (ref 11.6–15.4)
FERRITIN SERPL-MCNC: 509 NG/ML (ref 30–400)
GLOBULIN SER CALC-MCNC: 2.5 G/DL (ref 1.5–4.5)
GLUCOSE SERPL-MCNC: 174 MG/DL (ref 65–99)
HCT VFR BLD AUTO: 26.9 % (ref 37.5–51)
HGB BLD-MCNC: 8.5 G/DL (ref 13–17.7)
IRON SATN MFR SERPL: 22 % (ref 15–55)
IRON SERPL-MCNC: 60 UG/DL (ref 38–169)
LYMPHOCYTES # BLD AUTO: 2.4 X10E3/UL (ref 0.7–3.1)
LYMPHOCYTES NFR BLD AUTO: 13 %
MCH RBC QN AUTO: 30.2 PG (ref 26.6–33)
MCHC RBC AUTO-ENTMCNC: 31.6 G/DL (ref 31.5–35.7)
MCV RBC AUTO: 96 FL (ref 79–97)
MONOCYTES # BLD AUTO: 0.9 X10E3/UL (ref 0.1–0.9)
MONOCYTES NFR BLD AUTO: 5 %
MORPHOLOGY BLD-IMP: ABNORMAL
NEUTROPHILS # BLD AUTO: 14.7 X10E3/UL (ref 1.4–7)
NEUTROPHILS NFR BLD AUTO: 81 %
NRBC BLD AUTO-RTO: 1 % (ref 0–0)
PLATELET # BLD AUTO: 207 X10E3/UL (ref 150–450)
POTASSIUM SERPL-SCNC: 4.1 MMOL/L (ref 3.5–5.2)
PROT SERPL-MCNC: 6.2 G/DL (ref 6–8.5)
RBC # BLD AUTO: 2.81 X10E6/UL (ref 4.14–5.8)
SODIUM SERPL-SCNC: 135 MMOL/L (ref 134–144)
TIBC SERPL-MCNC: 276 UG/DL (ref 250–450)
UIBC SERPL-MCNC: 216 UG/DL (ref 111–343)
WBC # BLD AUTO: 18.1 X10E3/UL (ref 3.4–10.8)

## 2022-07-12 ENCOUNTER — OFFICE VISIT (OUTPATIENT)
Dept: ONCOLOGY | Age: 67
End: 2022-07-12
Payer: MEDICARE

## 2022-07-12 VITALS
TEMPERATURE: 99 F | BODY MASS INDEX: 30.21 KG/M2 | WEIGHT: 204 LBS | DIASTOLIC BLOOD PRESSURE: 68 MMHG | OXYGEN SATURATION: 97 % | HEIGHT: 69 IN | HEART RATE: 101 BPM | SYSTOLIC BLOOD PRESSURE: 132 MMHG

## 2022-07-12 DIAGNOSIS — D50.8 IRON DEFICIENCY ANEMIA SECONDARY TO INADEQUATE DIETARY IRON INTAKE: ICD-10-CM

## 2022-07-12 DIAGNOSIS — C61 PROSTATE CANCER (HCC): ICD-10-CM

## 2022-07-12 DIAGNOSIS — D64.9 CHRONIC ANEMIA: Primary | ICD-10-CM

## 2022-07-12 PROBLEM — C79.82 SECONDARY MALIGNANT NEOPLASM OF GENITAL ORGANS (HCC): Status: ACTIVE | Noted: 2022-07-12

## 2022-07-12 PROBLEM — R51.9 HEADACHE: Status: ACTIVE | Noted: 2022-07-12

## 2022-07-12 PROCEDURE — 1123F ACP DISCUSS/DSCN MKR DOCD: CPT | Performed by: INTERNAL MEDICINE

## 2022-07-12 PROCEDURE — 99213 OFFICE O/P EST LOW 20 MIN: CPT | Performed by: INTERNAL MEDICINE

## 2022-07-12 PROCEDURE — 1101F PT FALLS ASSESS-DOCD LE1/YR: CPT | Performed by: INTERNAL MEDICINE

## 2022-07-12 PROCEDURE — G8427 DOCREV CUR MEDS BY ELIG CLIN: HCPCS | Performed by: INTERNAL MEDICINE

## 2022-07-12 PROCEDURE — 3017F COLORECTAL CA SCREEN DOC REV: CPT | Performed by: INTERNAL MEDICINE

## 2022-07-12 PROCEDURE — G8432 DEP SCR NOT DOC, RNG: HCPCS | Performed by: INTERNAL MEDICINE

## 2022-07-12 PROCEDURE — G8419 CALC BMI OUT NRM PARAM NOF/U: HCPCS | Performed by: INTERNAL MEDICINE

## 2022-07-12 PROCEDURE — G8536 NO DOC ELDER MAL SCRN: HCPCS | Performed by: INTERNAL MEDICINE

## 2022-07-12 NOTE — PROGRESS NOTES
Hematology/Oncology  Progress Note    Name: Franco Dawson      : 1955  PCP: Huang Mobley MD     Mr. Concepción Dumont is a 79 y.o. man with a history of chronic anemia. Subjective:     Mr. Concepción Dumont is a 51-year-old man with chronic anemia. He reports he started having this problem in 2018. Patient was being followed previously by Dr. Karl Saldana who retired. Today he is here in our clinic for his follow up office visit. [facility] .   The patient otherwise has no other complaints. Denied fever, chills, night sweat, unintentional weight loss, skin lumps or bumps, acute bleeding or bruising issues. Denied headache, acute vision change, dizziness, chest pain, worsen shortness of breath, palpitation, productive cough, nausea, vomiting, abdominal pain, altered bowel habits, dysuria, new bone pain or back pain, focal numbness or weakness. Past medical history, family history, and social history: these were reviewed and remains unchanged. Past Medical History:   Diagnosis Date    Chronic kidney disease     Diabetes mellitus     Essential hypertension     Kidney disease     Prostate cancer ([facility] [address]      Past Surgical History:   Procedure Laterality Date    HX CERVICAL FUSION      HX OTHER SURGICAL      Prostate Sx r/t cancer (seed implant)    HX OTHER SURGICAL      shoulder surgery     Social History     Socioeconomic History    Marital status:      Spouse name: Not on file    Number of children: Not on file    Years of education: Not on file    Highest education level: Not on file   Occupational History    Not on file   Tobacco Use    Smoking status: Former Smoker     Packs/day: 0.25    Smokeless tobacco: Never Used    Tobacco comment: Patient quit in , and started back.  Light Smoker now   Substance and Sexual Activity    Alcohol use: No     Alcohol/week: 0.0 standard drinks    Drug use: No    Sexual activity: Not on file Other Topics Concern    Not on file   Social History Narrative    Not on file     Social Determinants of Health     Financial Resource Strain:     Difficulty of Paying Living Expenses: Not on file   Food Insecurity:     Worried About Running Out of Food in the Last Year: Not on file    Haile of Food in the Last Year: Not on file   Transportation Needs:     Lack of Transportation (Medical): Not on file    Lack of Transportation (Non-Medical): Not on file   Physical Activity:     Days of Exercise per Week: Not on file    Minutes of Exercise per Session: Not on file   Stress:     Feeling of Stress : Not on file   Social Connections:     Frequency of Communication with Friends and Family: Not on file    Frequency of Social Gatherings with Friends and Family: Not on file    Attends [employer] Services: Not on file    Active Member of [address] [place] [address] or Organizations: Not on file    Attends Club or Organization Meetings: Not on file    Marital Status: Not on file   Intimate Partner Violence:     Fear of Current or Ex-Partner: Not on file    Emotionally Abused: Not on file    Physically Abused: Not on file    Sexually Abused: Not on file   Housing Stability:     Unable to Pay for Housing in the Last Year: Not on file    Number of [place] in the Last Year: Not on file    Unstable Housing in the Last Year: Not on file     Family History   Problem Relation Age of Onset    Diabetes Father     No Known Problems Brother     Other Brother         PE     Current Outpatient Medications   Medication Sig Dispense Refill    insulin aspart protamine/insulin aspart (NOVOLOG MIX 70-30FLEXPEN U-100) 100 unit/mL (70-30) inpn 12 Units by SubCUTAneous route Before breakfast, lunch, and dinner.  ARIPiprazole (ABILIFY) 10 mg tablet Take 10 mg by mouth daily.  zolpidem (AMBIEN) 5 mg tablet Take  by mouth nightly as needed for Sleep.       acetaminophen (TYLENOL) 500 mg tablet Take 500 mg by mouth every six (6) hours as needed for Pain.  rosuvastatin (CRESTOR) 40 mg tablet Take 40 mg by mouth nightly.  hydroCHLOROthiazide 25 mg tab 25 mg, lisinopril 20 mg tab 20 mg Take  by mouth daily. HCTZ 12.5 mg / Lisinopril 20 mg, PO Daily      venlafaxine (EFFEXOR) 75 mg tablet Take 75 mg by mouth daily.  multivitamin (ONE A DAY) tablet Take 1 Tab by mouth daily.  LORazepam (ATIVAN) 1 mg tablet Take 1 mg by mouth every twelve (12) hours as needed for Anxiety.  aspirin 81 mg chewable tablet Take 81 mg by mouth daily.  cyclobenzaprine (FLEXERIL) 10 mg tablet Take 1 Tab by mouth three (3) times daily as needed for Muscle Spasm(s). 60 Tab 0    metFORMIN (GLUCOPHAGE) 1,000 mg tablet Take 1,000 mg by mouth two (2) times daily (with meals).  diclofenac EC (VOLTAREN) 75 mg EC tablet Take 1 Tab by mouth two (2) times daily (with meals). 60 Tab 2    donepezil (ARICEPT) 10 mg tablet Take 10 mg by mouth nightly.  LUNESTA 3 mg tablet Take 3 mg by mouth nightly. Review of Systems   Constitutional: Positive for malaise/fatigue. Negative for chills, diaphoresis, fever and weight loss. Respiratory: Negative for cough, hemoptysis, shortness of breath and wheezing. Cardiovascular: Negative for chest pain, palpitations and leg swelling. Gastrointestinal: Negative for abdominal pain, diarrhea, heartburn, nausea and vomiting. Genitourinary: Negative for dysuria, frequency, hematuria and urgency. Musculoskeletal: Negative for joint pain and myalgias. Skin: Negative for itching and rash. Neurological: Negative for dizziness, seizures, weakness and headaches. Psychiatric/Behavioral: Negative for depression. The patient does not have insomnia.              Objective:     Visit Vitals  /68   Pulse (!) 101   Temp 99 °F (37.2 °C)   Ht 5' 9\" (1.753 m)   Wt 92.5 kg (204 lb)   SpO2 97%   BMI 30.13 kg/m²       ECOG Performance Status (grade): 1  0 - able to carry on all pre-disease activity w/out restriction  1 - restricted but able to carry out light work  2 - ambulatory and can self- care but unable to carry out work  3 - bed or chair >50% of waking hours  4 - completely disable, total care, confined to bed or chair    Physical Exam  Constitutional:       General: He is not in acute distress. HENT:      Head: Normocephalic and atraumatic. Eyes:      Pupils: Pupils are equal, round, and reactive to light. Cardiovascular:      Pulses: Normal pulses. Heart sounds: Normal heart sounds. No murmur heard. Pulmonary:      Effort: Pulmonary effort is normal. No respiratory distress. Breath sounds: Normal breath sounds. Abdominal:      General: Bowel sounds are normal. There is no distension. Palpations: Abdomen is soft. There is no mass. Tenderness: There is no abdominal tenderness. There is no guarding. Musculoskeletal:         General: No swelling or tenderness. Cervical back: Neck supple. No rigidity. Lymphadenopathy:      Cervical: No cervical adenopathy. Skin:     General: Skin is warm. Findings: No rash. Neurological:      Mental Status: He is alert and oriented to person, place, and time. Mental status is at baseline. Cranial Nerves: No cranial nerve deficit. Psychiatric:         Mood and Affect: Mood normal.          Diagnostics:      No results found for this or any previous visit (from the past 96 hour(s)). Imaging:  Results for orders placed during the hospital encounter of 11/05/18    IR BX BONE MARROW DIAGNOSTIC    Narrative  PREOPERATIVE DIAGNOSIS: Anemia. POSTOPERATIVE DIAGNOSIS: Same    ATTENDING: Dr. Milind Medina M.D.    Bebo Bhardwaj: None. PROCEDURES: Fluoroscopically guided bone marrow biopsy. ANESTHESIA: Local 1% lidocaine as well as moderate intravenous sedation with  Versed and fentanyl given and monitored per independently trained interventional  radiology nurse under my direct supervision for 20 minutes.  Please see nursing  records for detailed medication dosing. CONTRAST: None. COMPLICATIONS: None    DRAIN: No    CATHETER: None. EBL: Minimal.    SPECIMEN: 2 aspirates and single core biopsy was obtained. Fluoroscopy radiation dose: Cumulative Air KERMA = 26 mGy. TECHNIQUE: After detailed explanation of risks and benefits of the procedure  verbal and written consent were obtained. Patient was brought to the  interventional radiology room and placed prone on the table. Timeout was  performed.  view was obtained. Target lesion was identified and skin was  marked overlying left iliac crest.    Left iliac crest region was prepped and draped in the usual sterile fashion. Maximum sterile there are technique was used. 1% lidocaine solution was instilled in the skin superficial and deep  subcutaneous soft tissues overlying the biopsy region. Under direct fluoroscopy guidance using 11-gauge [employer] biopsy needle was  advanced down through left iliac crest periosteum with drill power assistance. Single pass was made. 2 aspirates and single core biopsy were obtained. Given to  pathology on site. Postbiopsy imaging was obtained. FINDINGS: Fluoroscopic guidance demonstrated good position of the biopsy needle. Postbiopsy imaging shows no abnormality. Impression  IMPRESSION:    Successful, uncomplicated fluoroscopically guided bone marrow biopsy. Results for orders placed during the hospital encounter of 06/20/16    XR FOOT LT MIN 3 V    Narrative  Left Foot Three Views    CPT CODE: 29163    HISTORY: Abnormal bone scan with increased activity at the left mid foot, bone  scan performed for prostate carcinoma. COMPARISON: Bone scan same day. FINDINGS:    Three views obtained. There is narrowing with spurring involving the metatarsal  tarsal joints likely mild subchondral sclerosis and erosions at the second  metatarsal tarsal joint. Narrowing of the naviculocuneiform joints with  spurring.  Healed fracture deformity of the distal fifth metatarsal.    Impression  :    Osteoarthrosis of the midfoot. Willie Anneodore Results for orders placed during the hospital encounter of 06/20/16    CT ABD PELV W CONT    Narrative  CT Abdomen And Pelvis with Intravenous Contrast    INDICATION: Prostate cancer. TECHNIQUE: 5 mm collimation axial images obtained from the diaphragm to the  level of the pubic symphysis following the uneventful administration of  100 cc  of low osmolar, nonionic intravenous contrast.    COMPARISON: Unenhanced CT 12/5/2015. ABDOMEN FINDINGS:    Lung Bases: Clear. The visualized portions of the mediastinum are normal..    Liver: Normal attenuation. No evidence for mass. Gallbladder: Present and appears normal. No biliary ductal dilatation. Pancreas: Normal attenuation without mass or ductal dilatation. Spleen: Normal in size. No evidence of mass. .    Adrenal Glands: No evidence for mass. Kidneys:    Right: Normal enhancement. A posterior interpolar cyst measures 12 mm. No  hydronephrosis. Left:  Normal enhancement with several cysts measuring up to 16 mm. No  hydronephrosis. Lymph Nodes: A mildly prominent lymph node adjacent to the left common iliac  artery (image 35) measures 8 mm in longest dimension and is stable. There are no  new enlarged lymph nodes. There are no enlarged abdominal lymph nodes. Aorta is diffusely calcified but not aneurysmally dilated. PELVIS FINDINGS:    Bowel: Small Bowel: Normal in caliber with normal wall thickness. Large Bowel: Scattered diverticula are present without associated inflammation. Appendix: Not visualized and may be absent or collapsed. Bladder: Normal. No ureteral dilatation. Lymph nodes: No evidence of mesenteric or pelvic lymph node enlargement. Bones: Intramedullary noemy is in the left femur without associated fracture or  lucency to suggest loosening. There are scattered bone islands in the proximal  femurs.  There are degenerative changes of the spine. There are no destructive  lesions. Impression  :    1. No soft tissue mass to suggest metastases. A mildly prominent  retroperitoneal lymph node is stable. No evidence of osseous metastases noting  bone scan is a more sensitive modality to detect osseous metastasis. 2.   Bilateral renal cysts. Diverticulosis coli. 3. Left hip orthopedic hardware as described above. Assessment:     1. Chronic anemia    2. Iron deficiency anemia secondary to inadequate dietary iron intake    3. Prostate cancer [facility])      Plan:   Chronic anemia/ CARMELO  -- Patient was being followed previously by Dr. Cihka Curran who retired. His previous bone marrow biopsy reported mild hypocellularity, no specific features are identified within the bone marrow. He was diagnosed of presumably early MDS. -- Today I have reviewed with the patient about recent lab reports. [facility] Jim Hayes for prostate cancer. He reported he has started chemotherapy recently. .  I have advised the patient to continue f/u CBC with his [facility]. We will defer CBC monitoring and chronic anemia management to his [place] [facility] physician. -- We will see the patient back as needed.     Prostate cancer  -- Lurpon injection q 6 months  -- [facility]. Now started chemotherapy.             No orders of the defined types were placed in this encounter. Mr. Lakhwinder Tolentino has a reminder for a \"due or due soon\" health maintenance. I have asked that he contact his primary care provider for follow-up on this health maintenance. All of patient's questions answered to their apparent satisfaction. They verbally show understanding and agreement with aforementioned plan. Calvin Aguilar MD  7/12/2022            Parts of this document has been produced using Dragon dictation system. Unrecognized errors in transcription may be present.  Please do not hesitate to reach out for any questions or clarifications.       CC: Josue Famrer MD